# Patient Record
Sex: FEMALE | Race: ASIAN | NOT HISPANIC OR LATINO | URBAN - METROPOLITAN AREA
[De-identification: names, ages, dates, MRNs, and addresses within clinical notes are randomized per-mention and may not be internally consistent; named-entity substitution may affect disease eponyms.]

---

## 2024-09-05 ENCOUNTER — INPATIENT (INPATIENT)
Facility: HOSPITAL | Age: 59
LOS: 16 days | Discharge: HOME HEALTH | End: 2024-09-22
Attending: HOSPITALIST | Admitting: HOSPITALIST
Payer: MEDICAID

## 2024-09-05 VITALS
TEMPERATURE: 98 F | HEIGHT: 62 IN | SYSTOLIC BLOOD PRESSURE: 121 MMHG | HEART RATE: 98 BPM | OXYGEN SATURATION: 97 % | DIASTOLIC BLOOD PRESSURE: 73 MMHG | WEIGHT: 147.93 LBS | RESPIRATION RATE: 18 BRPM

## 2024-09-05 LAB
ALBUMIN SERPL ELPH-MCNC: 3 G/DL — LOW (ref 3.3–5)
ALP SERPL-CCNC: 103 U/L — SIGNIFICANT CHANGE UP (ref 40–120)
ALT FLD-CCNC: 26 U/L — SIGNIFICANT CHANGE UP (ref 4–33)
ANION GAP SERPL CALC-SCNC: 20 MMOL/L — HIGH (ref 7–14)
ANION GAP SERPL CALC-SCNC: 23 MMOL/L — HIGH (ref 7–14)
ANISOCYTOSIS BLD QL: SLIGHT — SIGNIFICANT CHANGE UP
APPEARANCE UR: ABNORMAL
APTT BLD: 23.2 SEC — LOW (ref 24.5–35.6)
AST SERPL-CCNC: 37 U/L — HIGH (ref 4–32)
BACTERIA # UR AUTO: ABNORMAL /HPF
BASOPHILS # BLD AUTO: 0.07 K/UL — SIGNIFICANT CHANGE UP (ref 0–0.2)
BASOPHILS NFR BLD AUTO: 0.8 % — SIGNIFICANT CHANGE UP (ref 0–2)
BILIRUB SERPL-MCNC: 0.7 MG/DL — SIGNIFICANT CHANGE UP (ref 0.2–1.2)
BILIRUB UR-MCNC: NEGATIVE — SIGNIFICANT CHANGE UP
BUN SERPL-MCNC: 140 MG/DL — HIGH (ref 7–23)
BUN SERPL-MCNC: 143 MG/DL — HIGH (ref 7–23)
CALCIUM SERPL-MCNC: 7.9 MG/DL — LOW (ref 8.4–10.5)
CALCIUM SERPL-MCNC: 8.8 MG/DL — SIGNIFICANT CHANGE UP (ref 8.4–10.5)
CAST: 2 /LPF — SIGNIFICANT CHANGE UP (ref 0–4)
CHLORIDE SERPL-SCNC: 99 MMOL/L — SIGNIFICANT CHANGE UP (ref 98–107)
CHLORIDE SERPL-SCNC: 99 MMOL/L — SIGNIFICANT CHANGE UP (ref 98–107)
CO2 SERPL-SCNC: 12 MMOL/L — LOW (ref 22–31)
CO2 SERPL-SCNC: 13 MMOL/L — LOW (ref 22–31)
COLOR SPEC: YELLOW — SIGNIFICANT CHANGE UP
CREAT SERPL-MCNC: 6.86 MG/DL — HIGH (ref 0.5–1.3)
CREAT SERPL-MCNC: 7.02 MG/DL — HIGH (ref 0.5–1.3)
DIFF PNL FLD: ABNORMAL
EGFR: 6 ML/MIN/1.73M2 — LOW
EGFR: 6 ML/MIN/1.73M2 — LOW
EOSINOPHIL # BLD AUTO: 0 K/UL — SIGNIFICANT CHANGE UP (ref 0–0.5)
EOSINOPHIL NFR BLD AUTO: 0 % — SIGNIFICANT CHANGE UP (ref 0–6)
FLUAV AG NPH QL: SIGNIFICANT CHANGE UP
FLUBV AG NPH QL: SIGNIFICANT CHANGE UP
GAS PNL BLDV: SIGNIFICANT CHANGE UP
GIANT PLATELETS BLD QL SMEAR: PRESENT — SIGNIFICANT CHANGE UP
GLUCOSE SERPL-MCNC: 148 MG/DL — HIGH (ref 70–99)
GLUCOSE SERPL-MCNC: 152 MG/DL — HIGH (ref 70–99)
GLUCOSE UR QL: NEGATIVE MG/DL — SIGNIFICANT CHANGE UP
HCT VFR BLD CALC: 31.4 % — LOW (ref 34.5–45)
HGB BLD-MCNC: 10.7 G/DL — LOW (ref 11.5–15.5)
HYPOCHROMIA BLD QL: SLIGHT — SIGNIFICANT CHANGE UP
IANC: 7.73 K/UL — HIGH (ref 1.8–7.4)
INR BLD: 1.2 RATIO — HIGH (ref 0.85–1.18)
KETONES UR-MCNC: NEGATIVE MG/DL — SIGNIFICANT CHANGE UP
LEUKOCYTE ESTERASE UR-ACNC: ABNORMAL
LYMPHOCYTES # BLD AUTO: 0.13 K/UL — LOW (ref 1–3.3)
LYMPHOCYTES # BLD AUTO: 1.5 % — LOW (ref 13–44)
MCHC RBC-ENTMCNC: 26.1 PG — LOW (ref 27–34)
MCHC RBC-ENTMCNC: 34.1 GM/DL — SIGNIFICANT CHANGE UP (ref 32–36)
MCV RBC AUTO: 76.6 FL — LOW (ref 80–100)
MICROCYTES BLD QL: SLIGHT — SIGNIFICANT CHANGE UP
MONOCYTES # BLD AUTO: 0.19 K/UL — SIGNIFICANT CHANGE UP (ref 0–0.9)
MONOCYTES NFR BLD AUTO: 2.3 % — SIGNIFICANT CHANGE UP (ref 2–14)
NEUTROPHILS # BLD AUTO: 7.98 K/UL — HIGH (ref 1.8–7.4)
NEUTROPHILS NFR BLD AUTO: 95.4 % — HIGH (ref 43–77)
NITRITE UR-MCNC: POSITIVE
OVALOCYTES BLD QL SMEAR: SLIGHT — SIGNIFICANT CHANGE UP
PH UR: 5.5 — SIGNIFICANT CHANGE UP (ref 5–8)
PLAT MORPH BLD: NORMAL — SIGNIFICANT CHANGE UP
PLATELET # BLD AUTO: 139 K/UL — LOW (ref 150–400)
PLATELET COUNT - ESTIMATE: ABNORMAL
POIKILOCYTOSIS BLD QL AUTO: SIGNIFICANT CHANGE UP
POLYCHROMASIA BLD QL SMEAR: SLIGHT — SIGNIFICANT CHANGE UP
POTASSIUM SERPL-MCNC: 5.9 MMOL/L — HIGH (ref 3.5–5.3)
POTASSIUM SERPL-MCNC: 6.9 MMOL/L — CRITICAL HIGH (ref 3.5–5.3)
POTASSIUM SERPL-SCNC: 5.9 MMOL/L — HIGH (ref 3.5–5.3)
POTASSIUM SERPL-SCNC: 6.9 MMOL/L — CRITICAL HIGH (ref 3.5–5.3)
PROT SERPL-MCNC: 7.1 G/DL — SIGNIFICANT CHANGE UP (ref 6–8.3)
PROT UR-MCNC: 30 MG/DL
PROTHROM AB SERPL-ACNC: 13.4 SEC — HIGH (ref 9.5–13)
RBC # BLD: 4.1 M/UL — SIGNIFICANT CHANGE UP (ref 3.8–5.2)
RBC # FLD: 15.9 % — HIGH (ref 10.3–14.5)
RBC BLD AUTO: ABNORMAL
RBC CASTS # UR COMP ASSIST: 1 /HPF — SIGNIFICANT CHANGE UP (ref 0–4)
REVIEW: SIGNIFICANT CHANGE UP
RSV RNA NPH QL NAA+NON-PROBE: SIGNIFICANT CHANGE UP
SARS-COV-2 RNA SPEC QL NAA+PROBE: SIGNIFICANT CHANGE UP
SODIUM SERPL-SCNC: 132 MMOL/L — LOW (ref 135–145)
SODIUM SERPL-SCNC: 134 MMOL/L — LOW (ref 135–145)
SP GR SPEC: 1.01 — SIGNIFICANT CHANGE UP (ref 1–1.03)
SQUAMOUS # UR AUTO: 0 /HPF — SIGNIFICANT CHANGE UP (ref 0–5)
TROPONIN T, HIGH SENSITIVITY RESULT: 13 NG/L — SIGNIFICANT CHANGE UP
UROBILINOGEN FLD QL: 0.2 MG/DL — SIGNIFICANT CHANGE UP (ref 0.2–1)
WBC # BLD: 8.36 K/UL — SIGNIFICANT CHANGE UP (ref 3.8–10.5)
WBC # FLD AUTO: 8.36 K/UL — SIGNIFICANT CHANGE UP (ref 3.8–10.5)
WBC UR QL: 141 /HPF — HIGH (ref 0–5)

## 2024-09-05 PROCEDURE — 99291 CRITICAL CARE FIRST HOUR: CPT

## 2024-09-05 PROCEDURE — 71046 X-RAY EXAM CHEST 2 VIEWS: CPT | Mod: 26

## 2024-09-05 PROCEDURE — 74177 CT ABD & PELVIS W/CONTRAST: CPT | Mod: 26,MC

## 2024-09-05 RX ORDER — SODIUM ZIRCONIUM CYCLOSILICATE 10 G/10G
10 POWDER, FOR SUSPENSION ORAL ONCE
Refills: 0 | Status: COMPLETED | OUTPATIENT
Start: 2024-09-05 | End: 2024-09-05

## 2024-09-05 RX ORDER — SODIUM CHLORIDE IRRIG SOLUTION 0.9 %
1000 SOLUTION, IRRIGATION IRRIGATION ONCE
Refills: 0 | Status: COMPLETED | OUTPATIENT
Start: 2024-09-05 | End: 2024-09-05

## 2024-09-05 RX ORDER — INSULIN REGULAR, HUMAN 100/ML
5 VIAL (ML) INJECTION ONCE
Refills: 0 | Status: COMPLETED | OUTPATIENT
Start: 2024-09-05 | End: 2024-09-05

## 2024-09-05 RX ORDER — ACETAMINOPHEN 325 MG
1000 TABLET ORAL ONCE
Refills: 0 | Status: COMPLETED | OUTPATIENT
Start: 2024-09-05 | End: 2024-09-05

## 2024-09-05 RX ORDER — ALCOHOL ANTISEPTIC PADS
50 PADS, MEDICATED (EA) TOPICAL ONCE
Refills: 0 | Status: COMPLETED | OUTPATIENT
Start: 2024-09-05 | End: 2024-09-05

## 2024-09-05 RX ORDER — PIPERACILLIN SODIUM AND TAZOBACTAM SODIUM 12; 1.5 G/60ML; G/60ML
3.38 INJECTION, POWDER, LYOPHILIZED, FOR SOLUTION INTRAVENOUS ONCE
Refills: 0 | Status: COMPLETED | OUTPATIENT
Start: 2024-09-05 | End: 2024-09-05

## 2024-09-05 RX ADMIN — Medication 1000 MILLILITER(S): at 20:54

## 2024-09-05 RX ADMIN — Medication 1000 MILLIGRAM(S): at 21:54

## 2024-09-05 RX ADMIN — Medication 1000 MILLILITER(S): at 23:19

## 2024-09-05 RX ADMIN — PIPERACILLIN SODIUM AND TAZOBACTAM SODIUM 200 GRAM(S): 12; 1.5 INJECTION, POWDER, LYOPHILIZED, FOR SOLUTION INTRAVENOUS at 20:55

## 2024-09-05 RX ADMIN — Medication 400 MILLIGRAM(S): at 20:54

## 2024-09-05 NOTE — ED ADULT NURSE NOTE - NSFALLRISKINTERV_ED_ALL_ED
Assistance with ambulation/Communicate fall risk and risk factors to all staff, patient, and family/Provide visual cue: yellow wristband, yellow gown, etc/Reinforce activity limits and safety measures with patient and family/Call bell, personal items and telephone in reach/Instruct patient to call for assistance before getting out of bed/chair/stretcher/Non-slip footwear applied when patient is off stretcher/[place] to call system/Physically safe environment - no spills, clutter or unnecessary equipment/Purposeful Proactive Rounding/Room/bathroom lighting operational, light cord in reach

## 2024-09-05 NOTE — ED ADULT TRIAGE NOTE - CHIEF COMPLAINT QUOTE
c/o fever. weakness chills, onset 1 week ago reports was treated for UTI but   reports worsening weakness last 2 days and unable to perform ADLs at home, phx of arthritis on methotrexate.

## 2024-09-05 NOTE — ED ADULT NURSE NOTE - OBJECTIVE STATEMENT
Patient received at handoff as a new patient that was not seen yet. RN informed by Attending physician that patient is a septic workup because she looks very ill. Float nurse at bedside assisting. IV place 20G right AC, blood drawn and sent to lab, patient on cardiac monitor, EKG done. Patient noted to be breathing fast with use of abdominal muscles RR 37bpm. Rectal temp done 100.9. Tylenol and IVF started as ordered. V/s done and recorded.

## 2024-09-05 NOTE — ED PROVIDER NOTE - PROGRESS NOTE DETAILS
Patient's son: Ale  [phone] Srinath Bojorquez, ED Attending: Patient signed out to me, increased work of breathing, tachycardia to the 130s, bedside POCUS A-line predominant, concern for possible worsening acidosis.  Repeat VBG ordered, pending.  In the interim we will treat for hyperkalemia, given insulin, D50, will start bicarbonate drip.  MICU consulted, not a MICU candidate at this time.  Nephrology eval pending.  Will place patient on BiPAP for increased work of breathing, reassess to dispo. bicarb drip no lr  per neph    giec calcium prevetnion 1g IV  lasix 80mg IV x1  more insulin and dextrose  after this rp labs (bmp, vbg) 1-1.5h after. (Veronica Morillo MD-PGY1): Nephrology is following. Will admit to ICU ([facility]). Labs drawn prior to dextrose/insulin and bicarb drip, so will just order calcium as it is decreasing on labs, and she will have repeat labs (if here for much longer, can draw here, otherwise per ICU). Below are nephrology verbal recommendations for this patient and current treatments--she is borderline for requiring emergent dialysis.   Per nephrology   - IV calcium 1g for cardioprotection (will give)  - lasix 80mg IV (holding off per ER)  - more insulin/dextrose to shift (got this x1)  - bicarb drip, no LR (continuing)  - repeat bmp/vbg 1.5 hours after interventions (if here can repeat, otherwise per ICU). Patient's son: Ale  [phone]  Patient's other son who is RN: [phone] Patient's son who is here: Ale ([phone])  Patient's other son who is RN: [phone]    Results notable for cr of 7 with k of 6.9 that was slightly hemolyzed. Plan to insert garcia, consult neph for possible emergent dialysis, and ICU. AG elevated with normal lactate, so metabolic acidosis with AG could be due to renal failure at this point. EKG without acute changes concerning for hyperkalemia, will monitor and repeat labs. (Veronica Morillo MD-PGY1): Patient remains stable. Nephrology asking about repeat labs/interventions. Ordered repeat BMP and VBG at 0215, so will have this done. If still here when results return, may need additional interventions until she goes to ICU.

## 2024-09-05 NOTE — ED PROVIDER NOTE - OBJECTIVE STATEMENT
Attending/Ant: 57 yo F Sinhala speaking with unclear history, HTN, hyperchol recently treated for UTI with Cipro about10 days ago, reportedly not feeling well for the past three days, including fever/chills, dysuria, n/v and loose stools. History obtained from son by resident. Son is no longer available in the ED and patient not [id number] information.  LL # [id number] Rosalind Logan Attending/Ant: 59 yo F Setswana speaking with unclear history, HTN, hyperchol recently treated for UTI with Cipro about10 days ago, reportedly not feeling well for the past three days, including fever/chills, dysuria, n/v and loose stools. History obtained from son by resident. Son is no longer available in the ED and patient not [id number] information.   # [id number] Rosalind Logan    Ms. Aggie Call is a 57yo woman with a history of rheumatoid arthritis on MTX, HTN, HLD, and GERD who presents to the ER for evaluation of weakness. Patient is Setswana speaking, unable to provide meaningful contribution to history given altered mental status from likely infection.  Her son is interpreting.  Also spoke with her other son, who is an RN, to provide some collateral.    Per family, patient developed symptoms concerning for UTI about 10 days ago.  She went to her PCP and was given ciprofloxacin.  Denies that this was helpful and patient developed a fever with nausea and vomiting a few days ago.  Also reports throat pain for 2 to 3 days that is worse with eating/drinking.  Due to this, she has not had much oral intake, including no medications for 3 days.  Not currently taking her antibiotic due to this.  Son brought her to ER given worsening symptoms.  He also reports she accidentally took MiraLAX because she thought it was for GERD and has had diarrhea, but this is now resolved.  Patient was not able to answer if she has chest pain, shortness of breath, but did indicate some generalized abdominal pain that is not localized.  Her son worries he did not bring her soon enough.    Per RN son, he does not live with the patient and does not know fully her medical history, but denies any knowledge of prior kidney issues, heart issues, stroke, anything major.  He reports that she was her usual state of health, talking, taking care of herself 3 weeks ago.  Also reports that his brother and the patient do not have a good understanding of her medical conditions and medications.  He further does not know why she had prescribed hydroxychloroquine.  No other history to contribute. Of note, patient brought all her medications and there are prescriptions for losartan, PPI, statin, MTX, acetaminophen and plaquenil.

## 2024-09-05 NOTE — ED PROVIDER NOTE - CLINICAL SUMMARY MEDICAL DECISION MAKING FREE TEXT BOX
A/P 58-year-old female history of hypertension and unclear past medical history including surgical history, who reportedly was treated for UTI 10 days ago on Cipro now for the past 3 days reporting fever/chills, dysuria, and fatigue.  Exam noted for temperature of 101.1,+tachypnia,  lower abd PT, dry mucosa. Plan: Screening EKG, sepsis protocol, CT-abd/pelvis, cardiac monitor  No relevant EMR to reviewed A/P 58-year-old female history of hypertension and unclear past medical history including surgical history, who reportedly was treated for UTI 10 days ago on Cipro now for the past 3 days reporting fever/chills, dysuria, and fatigue.  Exam noted for temperature of 101.1,+tachypnia,  lower abd PT, dry mucosa. Plan: Screening EKG, sepsis protocol, CT-abd/pelvis, cardiac monitor  No relevant EMR to review    This is a 59yo woman with a history of rheumatoid arthritis on MTX, HTN, HLD, and GERD who presents to the ER for evaluation of weakness. Patient is Portuguese speaking. Has been having urinary symptoms for 10 days that is worsening. Was given cipro and stopped taking 3 days ago when she developed throat pain. Now with new fever, prompting ER eval per son. Vitals on arrival notable for BP-104/68, HR-97, T-98.5F (oral), T-101.1F (rectal), SpO2-99%. Physical notable for ill-appearing patient with tachypnea, dry mucous membranes, diffusely tender abdomen, mildly tachycardic, lungs clear.  This is most likely urosepsis, possibly pyelonephritis, so will plan for sepsis protocol (LR, empiric abx, labs), CT abdomen pelvis, and EKG/trop/cardiac monitoring. Dispo likely to admit.

## 2024-09-05 NOTE — ED PROVIDER NOTE - PHYSICAL EXAMINATION
Attending/Ant: Mild tachypneic; PERRL/EOMI, non-icterus, supple, no CHEPE, no JVD, RRR, CTAB; Abd-soft, +lower abd PT, no guarding or rebound, no HSM; no LE edema, Awake, alert, nonfocal; Skin-hot/dry  Rectal Temp: 101.1 Attending/Ant: Mild tachypneic; PERRL/EOMI, non-icterus, supple, no CHEPE, no JVD, RRR, CTAB; Abd-soft, +lower abd PT, no guarding or rebound, no HSM; no LE edema, Awake, alert, nonfocal; Skin-hot/dry  Rectal Temp: 101.1    PHYSICAL EXAM  General: Toxic, uncomfortable, mildly altered, son at bedside  HEENT: Normocephalic. Atraumatic. Normal icterus. MM dry.   Heart: Tachycardic rate, regular rhythm. Normal S1/S2. No murmurs.   Lungs: Clear to ausculation bilaterally, no wheezes, rhonchi. Normal effort.   GI: Soft, non-distended, diffuse tenderness, worse on LLQ. No guarding or rebound.   Neuro: Alert. Waxing and waning mental status. No focal deficits.   Ext: Peripheral pulses intact. No lower extremity edema.  Skin: Hot, dry.

## 2024-09-05 NOTE — CONSULT NOTE ADULT - ASSESSMENT
58-year-old female history of hypertension and unclear past medical history including surgical history, who reportedly was treated for UTI 10 days ago on Cipro now for the past 3 days reporting fever/chills, dysuria, and fatigue. MICU consulted for urgent HD.     #UTI  - Suspect possible recurrence of UTI failing PO abx outpatient, agree w/ Zosyn, blood and urine cultures drawn in ED. CTAP w/ Cystitis with ascending ureteritis and pyelonephritis.    #Renal Failure  #Acidosis  #HyperK  #Hyponatremia  - New onset renal failure of unknown duration w/ no prior labs on sunrise or HIE. Complicated by metabolic acidosis with respiratory compensation, hyperK, and hyponatremia in s/o likely volume overload. Patient with increased WOB as a result due to increased respiratory drive and likely volume overload.   - Nephrology evaluation pending for urgent HD, pt at this time can be temporized.     Recommendations:  - Agree with blood, urine cultures drawn in setting of pyelo, continue broad spectrum abx   - Initiate BIPAP for increased WOB, serial VBGs to monitor acidosis and compensation.  - Duonebs x1 for wheezing on exam  - Would follow-up with nephrology regarding HD plan - otherwise would recommend 1 amp of Sodium Bicarb and start Bicarb drip, agree with insulin/dextrose and lokelma for hyperkalemia. Would repeat lytes in 1-2 hours to monitor change.     Pt not a MICU candidate at this time, pending Nephrology HD plan. Patient seen and discussed with MICU attending, recommendations not final until attending attestation. Please reconsult as needed.  58-year-old female history of hypertension and unclear past medical history including surgical history, who reportedly was treated for UTI 10 days ago on Cipro now for the past 3 days reporting fever/chills, dysuria, and fatigue. MICU consulted for urgent HD.     #UTI  - Suspect possible recurrence of UTI failing PO abx outpatient, agree w/ Zosyn, blood and urine cultures drawn in ED. CTAP w/ Cystitis with ascending ureteritis and pyelonephritis.    #Renal Failure  #Acidosis  #HyperK  #Hyponatremia  - New onset renal failure of unknown duration w/ no prior labs on sunrise or HIE. Complicated by metabolic acidosis with respiratory compensation, hyperK, and hyponatremia in s/o likely volume overload. Patient with increased WOB as a result due to increased respiratory drive and likely volume overload.   - Nephrology evaluation pending for urgent HD, pt at this time can be temporized.     Recommendations:  - Agree with blood, urine cultures drawn in setting of pyelo, continue broad spectrum abx   - Initiate BIPAP for increased WOB  - Duonebs x1 for wheezing on exam  - Would follow-up with nephrology regarding HD plan - otherwise would recommend 1 amp of Sodium Bicarb and start Bicarb drip, agree with insulin/dextrose and lokelma for hyperkalemia. Would repeat lytes and VBG in 1-2 hours to monitor change.     Pt not a MICU candidate at this time, pending Nephrology HD plan. Patient seen and discussed with MICU attending, recommendations not final until attending attestation. Please reconsult as needed.  58-year-old female history of hypertension and unclear past medical history including surgical history, who reportedly was treated for UTI 10 days ago on Cipro now for the past 3 days reporting fever/chills, dysuria, and fatigue. MICU consulted for urgent HD in s/o new onset renal failure.     #UTI  - Suspect possible recurrence of UTI failing PO abx outpatient, agree w/ Zosyn, blood and urine cultures drawn in ED. CTAP w/ Cystitis with ascending ureteritis and pyelonephritis.    #Renal Failure  #Acidosis  #HyperK  #Hyponatremia  - New onset renal failure of unknown duration w/ no prior labs on sunrise or HIE. Complicated by metabolic acidosis with respiratory compensation, hyperK, and hyponatremia in s/o likely volume overload. Patient with increased WOB as a result due to increased respiratory drive and likely volume overload.   - Nephrology evaluation pending for urgent HD, pt at this time can be temporized.     Recommendations:  - Agree with blood, urine cultures drawn in setting of pyelo, continue broad spectrum abx   - Initiate BIPAP for increased WOB  - Duonebs x1 for wheezing on exam  - Would follow-up with nephrology regarding HD plan - otherwise would recommend 1 amp of Sodium Bicarb and start Bicarb drip, agree with insulin/dextrose and lokelma for hyperkalemia. Would repeat lytes and VBG in 1-2 hours to monitor change.     Pt not a MICU candidate at this time, pending Nephrology HD plan. Patient seen and discussed with MICU attending, recommendations not final until attending attestation. Please reconsult as needed.  58-year-old female history of hypertension and unclear past medical history including surgical history, who reportedly was treated for UTI 10 days ago on Cipro now for the past 3 days reporting fever/chills, dysuria, and fatigue, found to have pyelo. MICU consulted for urgent HD in s/o new onset renal failure.     #UTI  - Suspect possible recurrence of UTI failing PO abx outpatient, agree w/ Zosyn, blood and urine cultures drawn in ED. CTAP w/ Cystitis with ascending ureteritis and pyelonephritis.    #Renal Failure  #Acidosis  #HyperK  #Hyponatremia  - New onset renal failure of unknown duration w/ no prior labs on sunrise or HIE. Complicated by metabolic acidosis with respiratory compensation, hyperK, and hyponatremia in s/o likely volume overload. Patient with increased WOB as a result due to increased respiratory drive and likely volume overload.   - Nephrology evaluation pending for urgent HD, pt at this time can be temporized.     Recommendations:  - Agree with blood, urine cultures drawn in setting of pyelo, continue broad spectrum abx   - Initiate BIPAP for increased WOB  - Duonebs x1 for wheezing on exam  - Would follow-up with nephrology regarding HD plan - otherwise would recommend 1 amp of Sodium Bicarb and start Bicarb drip, agree with insulin/dextrose and lokelma for hyperkalemia. Would repeat lytes and VBG in 1-2 hours to monitor change.     Pt not a MICU candidate at this time, pending Nephrology HD plan. Patient seen and discussed with MICU attending, recommendations not final until attending attestation. Please reconsult as needed.

## 2024-09-05 NOTE — CONSULT NOTE ADULT - SUBJECTIVE AND OBJECTIVE BOX
CHIEF COMPLAINT: Fevers    HPI: 58-year-old female history of hypertension and unclear past medical history including surgical history, who reportedly was treated for UTI 10 days ago on Cipro now for the past 3 days reporting fever/chills, dysuria, and fatigue. MICU consulted for urgent HD.     Family not at bedside during provider interview, patient does not appear to be in pain however w/ significantly increased WOB and wheezing. Pt otherwise alert but nonverbal. ED course notable for hyperK to 6's then downtrended to 5's without intervention pt ordered for insulin/dextrose and lokelma. Pt acidotic with respiratory compensation on VBG. Cultures drawn, given Zosyn x1, CTAP performed, Cystitis with ascending ureteritis and pyelonephritis on prelim read. ECG at 10 PM  demonstrating narrow QRS, rate 80's, no peaked T's.     PAST MEDICAL & SURGICAL HISTORY:  HTN (hypertension)    FAMILY HISTORY:    SOCIAL HISTORY:    Allergies  No Known Allergies  Intolerances    HOME MEDICATIONS:    REVIEW OF SYSTEMS:  Unable to obtain ROS as patient nonverbal.     OBJECTIVE:  ICU Vital Signs Last 24 Hrs  T(C): 38.3 (05 Sep 2024 20:34), Max: 38.3 (05 Sep 2024 20:34)  T(F): 100.9 (05 Sep 2024 20:34), Max: 100.9 (05 Sep 2024 20:34)  HR: 96 (05 Sep 2024 20:34) (96 - 98)  BP: 148/67 (05 Sep 2024 20:34) (104/68 - 148/67)  BP(mean): --  ABP: --  ABP(mean): --  RR: 37 (05 Sep 2024 20:34) (18 - 37)  SpO2: 96% (05 Sep 2024 20:34) (96% - 99%)    O2 Parameters below as of 05 Sep 2024 20:34  Patient On (Oxygen Delivery Method): room air    CAPILLARY BLOOD GLUCOSE  POCT Blood Glucose.: 135 mg/dL (05 Sep 2024 23:27)    VITALS:   T(C): 38.3 (24 @ 20:34), Max: 38.3 (24 @ 20:34)  HR: 96 (24 @ 20:34) (96 - 98)  BP: 148/67 (24 @ 20:34) (104/68 - 148/67)  RR: 37 (24 @ 20:34) (18 - 37)  SpO2: 96% (24 @ 20:34) (96% - 99%)    GENERAL: NAD, lying in bed with increased WOB.   HEAD:  Atraumatic, normocephalic  EYES: EOMI, PERRLA, conjunctiva and sclera clear  ENT: Moist mucous membranes  NECK: Supple, no JVD  HEART: Regular rate and rhythm, no murmurs, rubs, or gallops  LUNGS: Increased WOB, wheezing bilaterally  ABDOMEN: Soft, nontender, nondistended, +BS  EXTREMITIES: 2+ peripheral pulses bilaterally. Mild 1+ edema to shins bilaterally.   NERVOUS SYSTEM:  A&Ox3, no focal deficits   SKIN: No rashes or lesions    HOSPITAL MEDICATIONS:  MEDICATIONS  (STANDING):  dextrose 50% Injectable 50 milliLiter(s) IV Push once  insulin regular  human recombinant 5 Unit(s) IV Push once  sodium zirconium cyclosilicate 10 Gram(s) Oral Once    MEDICATIONS  (PRN):    LABS:                        10.7   8.36  )-----------( 139      ( 05 Sep 2024 20:45 )             31.4         132<L>  |  99  |  140<H>  ----------------------------<  152<H>  5.9<H>   |  13<L>  |  6.86<H>    Ca    7.9<L>      05 Sep 2024 22:30    TPro  7.1  /  Alb  3.0<L>  /  TBili  0.7  /  DBili  x   /  AST  37<H>  /  ALT  26  /  AlkPhos  103  -    PT/INR - ( 05 Sep 2024 20:45 )   PT: 13.4 sec;   INR: 1.20 ratio         PTT - ( 05 Sep 2024 20:45 )  PTT:23.2 sec  Urinalysis Basic - ( 05 Sep 2024 22:30 )    Color: Yellow / Appearance: Cloudy / S.014 / pH: x  Gluc: 152 mg/dL / Ketone: Negative mg/dL  / Bili: Negative / Urobili: 0.2 mg/dL   Blood: x / Protein: 30 mg/dL / Nitrite: Positive   Leuk Esterase: Large / RBC: 1 /HPF /  /HPF   Sq Epi: x / Non Sq Epi: 0 /HPF / Bacteria: Many /HPF    Venous Blood Gas:   @ 20:45  7.23/30/35/13/46.8  VBG Lactate: 1.4

## 2024-09-06 DIAGNOSIS — N12 TUBULO-INTERSTITIAL NEPHRITIS, NOT SPECIFIED AS ACUTE OR CHRONIC: ICD-10-CM

## 2024-09-06 LAB
-  CTX-M RESISTANCE MARKER: SIGNIFICANT CHANGE UP
-  ESBL: SIGNIFICANT CHANGE UP
ACANTHOCYTES BLD QL SMEAR: SLIGHT — SIGNIFICANT CHANGE UP
ALBUMIN SERPL ELPH-MCNC: 2.4 G/DL — LOW (ref 3.3–5)
ALP SERPL-CCNC: 85 U/L — SIGNIFICANT CHANGE UP (ref 40–120)
ALT FLD-CCNC: 17 U/L — SIGNIFICANT CHANGE UP (ref 4–33)
ANION GAP SERPL CALC-SCNC: 17 MMOL/L — HIGH (ref 7–14)
ANION GAP SERPL CALC-SCNC: 21 MMOL/L — HIGH (ref 7–14)
ANION GAP SERPL CALC-SCNC: 24 MMOL/L — HIGH (ref 7–14)
ANISOCYTOSIS BLD QL: SLIGHT — SIGNIFICANT CHANGE UP
AST SERPL-CCNC: 21 U/L — SIGNIFICANT CHANGE UP (ref 4–32)
BASOPHILS # BLD AUTO: 0 K/UL — SIGNIFICANT CHANGE UP (ref 0–0.2)
BASOPHILS NFR BLD AUTO: 0 % — SIGNIFICANT CHANGE UP (ref 0–2)
BILIRUB SERPL-MCNC: 0.6 MG/DL — SIGNIFICANT CHANGE UP (ref 0.2–1.2)
BLOOD GAS ARTERIAL COMPREHENSIVE RESULT: SIGNIFICANT CHANGE UP
BLOOD GAS VENOUS COMPREHENSIVE RESULT: SIGNIFICANT CHANGE UP
BLOOD GAS VENOUS COMPREHENSIVE RESULT: SIGNIFICANT CHANGE UP
BUN SERPL-MCNC: 122 MG/DL — HIGH (ref 7–23)
BUN SERPL-MCNC: 138 MG/DL — HIGH (ref 7–23)
BUN SERPL-MCNC: 149 MG/DL — HIGH (ref 7–23)
CALCIUM SERPL-MCNC: 10 MG/DL — SIGNIFICANT CHANGE UP (ref 8.4–10.5)
CALCIUM SERPL-MCNC: 7.4 MG/DL — LOW (ref 8.4–10.5)
CALCIUM SERPL-MCNC: 8.1 MG/DL — LOW (ref 8.4–10.5)
CHLORIDE SERPL-SCNC: 95 MMOL/L — LOW (ref 98–107)
CHLORIDE SERPL-SCNC: 98 MMOL/L — SIGNIFICANT CHANGE UP (ref 98–107)
CHLORIDE SERPL-SCNC: 98 MMOL/L — SIGNIFICANT CHANGE UP (ref 98–107)
CO2 SERPL-SCNC: 11 MMOL/L — LOW (ref 22–31)
CO2 SERPL-SCNC: 14 MMOL/L — LOW (ref 22–31)
CO2 SERPL-SCNC: 27 MMOL/L — SIGNIFICANT CHANGE UP (ref 22–31)
CREAT SERPL-MCNC: 5.9 MG/DL — HIGH (ref 0.5–1.3)
CREAT SERPL-MCNC: 6.42 MG/DL — HIGH (ref 0.5–1.3)
CREAT SERPL-MCNC: 6.52 MG/DL — HIGH (ref 0.5–1.3)
E COLI DNA BLD POS QL NAA+NON-PROBE: SIGNIFICANT CHANGE UP
EGFR: 7 ML/MIN/1.73M2 — LOW
EGFR: 7 ML/MIN/1.73M2 — LOW
EGFR: 8 ML/MIN/1.73M2 — LOW
EOSINOPHIL # BLD AUTO: 0.09 K/UL — SIGNIFICANT CHANGE UP (ref 0–0.5)
EOSINOPHIL NFR BLD AUTO: 0.9 % — SIGNIFICANT CHANGE UP (ref 0–6)
GAS PNL BLDV: SIGNIFICANT CHANGE UP
GIANT PLATELETS BLD QL SMEAR: PRESENT — SIGNIFICANT CHANGE UP
GLUCOSE SERPL-MCNC: 129 MG/DL — HIGH (ref 70–99)
GLUCOSE SERPL-MCNC: 164 MG/DL — HIGH (ref 70–99)
GLUCOSE SERPL-MCNC: 221 MG/DL — HIGH (ref 70–99)
GRAM STN FLD: ABNORMAL
HCT VFR BLD CALC: 22.6 % — LOW (ref 34.5–45)
HCT VFR BLD CALC: 23.1 % — LOW (ref 34.5–45)
HGB BLD-MCNC: 7.9 G/DL — LOW (ref 11.5–15.5)
HGB BLD-MCNC: 8.3 G/DL — LOW (ref 11.5–15.5)
IANC: 9.41 K/UL — HIGH (ref 1.8–7.4)
LYMPHOCYTES # BLD AUTO: 0.54 K/UL — LOW (ref 1–3.3)
LYMPHOCYTES # BLD AUTO: 5.3 % — LOW (ref 13–44)
MAGNESIUM SERPL-MCNC: 2.7 MG/DL — HIGH (ref 1.6–2.6)
MAGNESIUM SERPL-MCNC: 3 MG/DL — HIGH (ref 1.6–2.6)
MCHC RBC-ENTMCNC: 26 PG — LOW (ref 27–34)
MCHC RBC-ENTMCNC: 26.3 PG — LOW (ref 27–34)
MCHC RBC-ENTMCNC: 35 GM/DL — SIGNIFICANT CHANGE UP (ref 32–36)
MCHC RBC-ENTMCNC: 35.9 GM/DL — SIGNIFICANT CHANGE UP (ref 32–36)
MCV RBC AUTO: 72.4 FL — LOW (ref 80–100)
MCV RBC AUTO: 75.3 FL — LOW (ref 80–100)
METHOD TYPE: SIGNIFICANT CHANGE UP
MICROCYTES BLD QL: SLIGHT — SIGNIFICANT CHANGE UP
MONOCYTES # BLD AUTO: 0.37 K/UL — SIGNIFICANT CHANGE UP (ref 0–0.9)
MONOCYTES NFR BLD AUTO: 3.6 % — SIGNIFICANT CHANGE UP (ref 2–14)
NEUTROPHILS # BLD AUTO: 9.17 K/UL — HIGH (ref 1.8–7.4)
NEUTROPHILS NFR BLD AUTO: 89.3 % — HIGH (ref 43–77)
NEUTS BAND # BLD: 0.9 % — SIGNIFICANT CHANGE UP (ref 0–6)
NRBC # BLD: 0 /100 WBCS — SIGNIFICANT CHANGE UP (ref 0–0)
NRBC # FLD: 0 K/UL — SIGNIFICANT CHANGE UP (ref 0–0)
OVALOCYTES BLD QL SMEAR: SLIGHT — SIGNIFICANT CHANGE UP
PHOSPHATE SERPL-MCNC: 7.1 MG/DL — HIGH (ref 2.5–4.5)
PHOSPHATE SERPL-MCNC: 8.3 MG/DL — HIGH (ref 2.5–4.5)
PLAT MORPH BLD: NORMAL — SIGNIFICANT CHANGE UP
PLATELET # BLD AUTO: 77 K/UL — LOW (ref 150–400)
PLATELET # BLD AUTO: 77 K/UL — LOW (ref 150–400)
PLATELET COUNT - ESTIMATE: ABNORMAL
POIKILOCYTOSIS BLD QL AUTO: SLIGHT — SIGNIFICANT CHANGE UP
POTASSIUM SERPL-MCNC: 4.4 MMOL/L — SIGNIFICANT CHANGE UP (ref 3.5–5.3)
POTASSIUM SERPL-MCNC: 5.1 MMOL/L — SIGNIFICANT CHANGE UP (ref 3.5–5.3)
POTASSIUM SERPL-MCNC: 5.3 MMOL/L — SIGNIFICANT CHANGE UP (ref 3.5–5.3)
POTASSIUM SERPL-SCNC: 4.4 MMOL/L — SIGNIFICANT CHANGE UP (ref 3.5–5.3)
POTASSIUM SERPL-SCNC: 5.1 MMOL/L — SIGNIFICANT CHANGE UP (ref 3.5–5.3)
POTASSIUM SERPL-SCNC: 5.3 MMOL/L — SIGNIFICANT CHANGE UP (ref 3.5–5.3)
PROT SERPL-MCNC: 5.1 G/DL — LOW (ref 6–8.3)
RBC # BLD: 3 M/UL — LOW (ref 3.8–5.2)
RBC # BLD: 3.19 M/UL — LOW (ref 3.8–5.2)
RBC # FLD: 14.9 % — HIGH (ref 10.3–14.5)
RBC # FLD: 15.3 % — HIGH (ref 10.3–14.5)
RBC BLD AUTO: ABNORMAL
SMUDGE CELLS # BLD: PRESENT — SIGNIFICANT CHANGE UP
SODIUM SERPL-SCNC: 133 MMOL/L — LOW (ref 135–145)
SODIUM SERPL-SCNC: 133 MMOL/L — LOW (ref 135–145)
SODIUM SERPL-SCNC: 139 MMOL/L — SIGNIFICANT CHANGE UP (ref 135–145)
SPECIMEN SOURCE: SIGNIFICANT CHANGE UP
SPECIMEN SOURCE: SIGNIFICANT CHANGE UP
WBC # BLD: 10.17 K/UL — SIGNIFICANT CHANGE UP (ref 3.8–10.5)
WBC # BLD: 10.55 K/UL — HIGH (ref 3.8–10.5)
WBC # FLD AUTO: 10.17 K/UL — SIGNIFICANT CHANGE UP (ref 3.8–10.5)
WBC # FLD AUTO: 10.55 K/UL — HIGH (ref 3.8–10.5)

## 2024-09-06 PROCEDURE — 99222 1ST HOSP IP/OBS MODERATE 55: CPT

## 2024-09-06 PROCEDURE — 99291 CRITICAL CARE FIRST HOUR: CPT | Mod: GC,25

## 2024-09-06 PROCEDURE — 76937 US GUIDE VASCULAR ACCESS: CPT | Mod: 26

## 2024-09-06 PROCEDURE — 36600 WITHDRAWAL OF ARTERIAL BLOOD: CPT

## 2024-09-06 RX ORDER — DEXLANSOPRAZOLE 60 MG
1 CAPSULE, DELAYED RELEASE, BIPHASIC ORAL
Refills: 0 | DISCHARGE

## 2024-09-06 RX ORDER — PIPERACILLIN SODIUM AND TAZOBACTAM SODIUM 12; 1.5 G/60ML; G/60ML
3.38 INJECTION, POWDER, LYOPHILIZED, FOR SOLUTION INTRAVENOUS ONCE
Refills: 0 | Status: DISCONTINUED | OUTPATIENT
Start: 2024-09-06 | End: 2024-09-06

## 2024-09-06 RX ORDER — PIPERACILLIN SODIUM AND TAZOBACTAM SODIUM 12; 1.5 G/60ML; G/60ML
3.38 INJECTION, POWDER, LYOPHILIZED, FOR SOLUTION INTRAVENOUS ONCE
Refills: 0 | Status: COMPLETED | OUTPATIENT
Start: 2024-09-06 | End: 2024-09-06

## 2024-09-06 RX ORDER — ACETAMINOPHEN 325 MG
1000 TABLET ORAL ONCE
Refills: 0 | Status: COMPLETED | OUTPATIENT
Start: 2024-09-06 | End: 2024-09-06

## 2024-09-06 RX ORDER — ERTAPENEM 1 G/1
500 INJECTION, POWDER, LYOPHILIZED, FOR SOLUTION INTRAMUSCULAR; INTRAVENOUS EVERY 24 HOURS
Refills: 0 | Status: COMPLETED | OUTPATIENT
Start: 2024-09-06 | End: 2024-09-15

## 2024-09-06 RX ORDER — HYDROXYCHLOROQUINE SULFATE 200 MG/1
1 TABLET, FILM COATED ORAL
Refills: 0 | DISCHARGE

## 2024-09-06 RX ORDER — CHLORHEXIDINE GLUCONATE ORAL RINSE 1.2 MG/ML
1 SOLUTION DENTAL
Refills: 0 | Status: DISCONTINUED | OUTPATIENT
Start: 2024-09-06 | End: 2024-09-22

## 2024-09-06 RX ORDER — SODIUM CHLORIDE 0.9 % (FLUSH) 0.9 %
500 SYRINGE (ML) INJECTION ONCE
Refills: 0 | Status: COMPLETED | OUTPATIENT
Start: 2024-09-06 | End: 2024-09-06

## 2024-09-06 RX ORDER — SODIUM CHLORIDE IRRIG SOLUTION 0.9 %
1000 SOLUTION, IRRIGATION IRRIGATION ONCE
Refills: 0 | Status: COMPLETED | OUTPATIENT
Start: 2024-09-06 | End: 2024-09-06

## 2024-09-06 RX ORDER — FUROSEMIDE 10 MG/ML
80 INJECTION INTRAVENOUS ONCE
Refills: 0 | Status: DISCONTINUED | OUTPATIENT
Start: 2024-09-06 | End: 2024-09-06

## 2024-09-06 RX ORDER — ATORVASTATIN CALCIUM 10 MG/1
1 TABLET, FILM COATED ORAL
Refills: 0 | DISCHARGE

## 2024-09-06 RX ORDER — NOREPINEPHRINE BITARTRATE/D5W 16MG/250ML
0.05 PLASTIC BAG, INJECTION (ML) INTRAVENOUS
Qty: 8 | Refills: 0 | Status: DISCONTINUED | OUTPATIENT
Start: 2024-09-06 | End: 2024-09-06

## 2024-09-06 RX ORDER — FOLIC ACID 1 MG/1
1 TABLET ORAL
Refills: 0 | DISCHARGE

## 2024-09-06 RX ORDER — SODIUM BICARBONATE 650 MG
0.45 TABLET ORAL
Qty: 150 | Refills: 0 | Status: DISCONTINUED | OUTPATIENT
Start: 2024-09-06 | End: 2024-09-06

## 2024-09-06 RX ADMIN — ERTAPENEM 100 MILLIGRAM(S): 1 INJECTION, POWDER, LYOPHILIZED, FOR SOLUTION INTRAMUSCULAR; INTRAVENOUS at 16:35

## 2024-09-06 RX ADMIN — Medication 400 MILLIGRAM(S): at 04:51

## 2024-09-06 RX ADMIN — Medication 250 MILLILITER(S): at 07:08

## 2024-09-06 RX ADMIN — Medication 5000 UNIT(S): at 21:33

## 2024-09-06 RX ADMIN — Medication 150 MEQ/KG/HR: at 00:20

## 2024-09-06 RX ADMIN — Medication 1000 MILLIGRAM(S): at 22:09

## 2024-09-06 RX ADMIN — PIPERACILLIN SODIUM AND TAZOBACTAM SODIUM 200 GRAM(S): 12; 1.5 INJECTION, POWDER, LYOPHILIZED, FOR SOLUTION INTRAVENOUS at 03:47

## 2024-09-06 RX ADMIN — Medication 400 MILLIGRAM(S): at 16:15

## 2024-09-06 RX ADMIN — Medication 1000 MILLIGRAM(S): at 05:00

## 2024-09-06 RX ADMIN — Medication 1000 MILLILITER(S): at 10:00

## 2024-09-06 RX ADMIN — PIPERACILLIN SODIUM AND TAZOBACTAM SODIUM 25 GRAM(S): 12; 1.5 INJECTION, POWDER, LYOPHILIZED, FOR SOLUTION INTRAVENOUS at 12:45

## 2024-09-06 RX ADMIN — Medication 400 MILLIGRAM(S): at 21:31

## 2024-09-06 RX ADMIN — Medication 150 MEQ/KG/HR: at 08:28

## 2024-09-06 RX ADMIN — Medication 150 MEQ/KG/HR: at 02:53

## 2024-09-06 RX ADMIN — Medication 750 MILLILITER(S): at 04:23

## 2024-09-06 RX ADMIN — CHLORHEXIDINE GLUCONATE ORAL RINSE 1 APPLICATION(S): 1.2 SOLUTION DENTAL at 12:45

## 2024-09-06 RX ADMIN — Medication 50 MILLILITER(S): at 00:05

## 2024-09-06 RX ADMIN — Medication 5000 UNIT(S): at 05:30

## 2024-09-06 RX ADMIN — Medication 1000 MILLIGRAM(S): at 16:45

## 2024-09-06 RX ADMIN — Medication 5 UNIT(S): at 00:04

## 2024-09-06 RX ADMIN — PIPERACILLIN SODIUM AND TAZOBACTAM SODIUM 3.38 GRAM(S): 12; 1.5 INJECTION, POWDER, LYOPHILIZED, FOR SOLUTION INTRAVENOUS at 15:30

## 2024-09-06 RX ADMIN — Medication 5000 UNIT(S): at 14:57

## 2024-09-06 RX ADMIN — Medication 5.92 MICROGRAM(S)/KG/MIN: at 08:28

## 2024-09-06 RX ADMIN — Medication 200 GRAM(S): at 01:20

## 2024-09-06 NOTE — PATIENT PROFILE ADULT - FUNCTIONAL ASSESSMENT - BASIC MOBILITY 6.
1-calculated by average/Not able to assess (calculate score using [facility] averaging method)  1-calculated by average/Not able to assess (calculate score using [facility] averaging method)

## 2024-09-06 NOTE — CHART NOTE - NSCHARTNOTEFT_GEN_A_CORE
Critically ill patient requiring ABG to determine respiratory status.  This was an emergent procedure.  Patients radial artery was palpated/ visualized with US and cleaned with alcohol pad.   23g x 3/4" x 12" butterfly needed was inserted into the left/right radial artery with pulsatile flash.  One attempt was performed.  3cc of blood was obtained from patient.  Gauze pad was placed over site, needle withdrawn and firm pressure was held until complete hemostasis was achieved and band-aid was applied.  Patient tolerated procedure well with no complications.        [facility] RPA C [id number]

## 2024-09-06 NOTE — PROGRESS NOTE ADULT - ASSESSMENT
57yo woman, Hungarian speaking, h/o rheumatoid arthritis on MTX, HTN, HLD, prediabetic, and GERD who presents for generalized weakness, AMS, fever/chill for 3-4 days. Recent UTI treated with Cipro outpatient 10 days ago. CT c/f cystitis with ascending ureteritis and pyelonephritis. Pt found to have hyperkalemia, acidosis, and symptoms c/f acute renal failure. Admitted to MICU possible urgent HD.      ===================NEURO===================  AAOx0 - unknown baseline  #Lethargic, altered mental status   Likely 2/2 to complicated UTI vs. uremic encephalopathy vs. acidosis  >c/w bicarb gtt  >f/u UAx BCx   >c/w zosyn    ==================CARDIAC===================  #HTN  #HLD  on home losartan, atorvastatin  >holding home meds    ===================PULM====================  #Acidotic with respiratory compensation  increased WOB in the ED  >c/w BiPAP    ===================RENAL====================  #acute renal failure  #metabolic acidosis  #hyperkalemia  Scr 7.02, repeat 6.9  K 5.9  Likely 2/2 to complicated UTI vs. hypovolemic (decreased po intake, diarrhea iso misused miralax)    >c/w bicarb gtt  >monitor electrolytes  >if symptoms not improved, consider HD  >Nephro following    =====================GI======================  #Diet  NPO while on BiPAP    #GERD  on home dexlansoprazole   >hold home meds    ===================HEME/ONC=================  #RA  on home MTX 2.5 mg 6 tablets a week  last took on 9/2  #DVT ppx  Hep subQ    ======================ID========================  #recent UTI treated with Cipro (incomplete, self-discontinued 3-4 days ago)  #Cystitis with ascending ureteritis and pyelonephritis on CT  >c/w zosyn  >f/u UAx, BCx      =====================ENDO=================  #prediabetes (last A1c of 6.2)    DVT  Hep subq 57yo woman, Turkish speaking, h/o rheumatoid arthritis on MTX, HTN, HLD, prediabetic, and GERD who presents for generalized weakness, AMS, fever/chill for 3-4 days. Recent UTI treated with Cipro outpatient 10 days ago. CT c/f cystitis with ascending ureteritis and pyelonephritis. Pt found to have hyperkalemia, acidosis, and symptoms c/f acute renal failure. Admitted to MICU possible urgent HD.      ===================NEURO===================  AAOx0 - unknown baseline  #Lethargic, altered mental status   Likely 2/2 to complicated UTI vs. uremic encephalopathy vs. acidosis  >c/w bicarb gtt  >BCx (+): E.coli ESBL CTX resistant  >d/c zosyn  >start ertapenem 500mg qd (renal dose)    ==================CARDIAC===================  #HTN  #HLD  on home losartan, atorvastatin  >holding home meds    ===================PULM====================  #Acidotic with respiratory compensation  increased WOB in the ED  >c/w BiPAP    ===================RENAL====================  #acute renal failure  #metabolic acidosis  #hyperkalemia  Scr 7.02, repeat 6.9  K 5.9  Likely 2/2 to complicated UTI vs. hypovolemic (decreased po intake, diarrhea iso misused miralax)    >c/w bicarb gtt  >monitor electrolytes  >if symptoms not improved, consider HD  >Nephro following    =====================GI======================  #Diet  NPO while on BiPAP    #GERD  on home dexlansoprazole   >hold home meds    ===================HEME/ONC=================  #RA  on home MTX 2.5 mg 6 tablets a week  last took on 9/2  #DVT ppx  Hep subQ q8h    ======================ID========================  #recent UTI treated with Cipro (incomplete, self-discontinued 3-4 days ago)  #Cystitis with ascending ureteritis and pyelonephritis on CT  >c/w zosyn  >f/u UAx, BCx      =====================ENDO=================  #prediabetes (last A1c of 6.2)    =====================ETHICS=======================  Full Code

## 2024-09-06 NOTE — H&P ADULT - ATTENDING COMMENTS
59yo woman, Czech speaking, h/o rheumatoid arthritis on MTX, HTN, HLD, prediabetic, and GERD who presents for generalized weakness, AMS, fever/chill. In the ED, patient found to have urosepsis  with CT scan finding of cystitis with ascending ureteritis and pyelonephritis. Pt found to have hyperkalemia, acidosis, and symptoms in the setting of acute renal failure. Admitted to MICU possible urgent HD.  sepsis 2/2 UTI and pyelonephritis, pancx, Abx, procalcitonin, MRSA swab, possible need for Levophed to maintain MAP >65  hyperkalemia with metabolic acidosis and uremia, medical management of hyperkalemia for now, HCO3 gtt, serial BMP with VBG    renal consult for possible urgent HD  Christian strict I and O   Full code  DVT ppx heparin

## 2024-09-06 NOTE — H&P ADULT - NSHPLABSRESULTS_GEN_ALL_CORE
CT Abdomen and Pelvis w/ IV Cont (09.05.24 @ 21:38)     FINDINGS:  LOWER CHEST: Bibasilar subsegmental atelectasis.  LIVER: Within normal limits.  BILE DUCTS: Normal caliber.  GALLBLADDER: Within normal limits.  SPLEEN: Within normal limits.  PANCREAS: Within normal limits.  ADRENALS: Within normal limits.  KIDNEYS/URETERS: Bilateral striated renal nephrograms and mild perinephric fat stranding. Bilateral renal subcentimeter hypodensity   too small to characterize. No perinephric fluid collection or abscess. No hydronephrosis. Enhancement of the bilateral ureteral walls.  BLADDER: Mild wall thickening.  REPRODUCTIVE ORGANS: Uterus and adnexa within normal limits.  BOWEL: Small hiatal hernia. No bowel obstruction. Appendix is normal.  PERITONEUM/RETROPERITONEUM: Within normal limits.  VESSELS: Atherosclerotic changes.  LYMPH NODES: No lymphadenopathy.  ABDOMINAL WALL: Within normal limits.  BONES: Degenerative changes.    IMPRESSION:  Cystitis with ascending ureteritis and pyelonephritis.        Xray Chest 2 Views PA/Lat (09.05.24 @ 21:23)     FINDINGS:  The heart size is not accurately assessed on this projection.  The lungs are clear.  There is no pneumothorax or pleural effusion.    IMPRESSION:  Clear lungs.

## 2024-09-06 NOTE — PROGRESS NOTE ADULT - SUBJECTIVE AND OBJECTIVE BOX
Chilo Briceño MD PGY-1  ---------------------------------------------------------------------------------------------  Patient is a 58y old  Female who presents with a chief complaint of urgent HD (06 Sep 2024 01:47)      HPI:  Ms. Aggie Call is a 57yo woman, Lithuanian speaking, h/o rheumatoid arthritis on MTX, HTN, HLD, and GERD who presents for generalized weakness, AMS, fever/chill for 3-4 days. Per son, patient was given Cipro by PCP for symptoms c/f UTI about 10 days ago, after which she has been becoming more confused, lethargic, throat pain with worsening PO intake and self-discontinued medications for 3 days.    In the ED, pt was found to be febrile, tachypneic, /107, and increased WOB. Labs notable for K 6.9 (hemolyzed), repeat 5.9 with no significant change on EKG, Scr 6.86, VBG 7.23/30/35/12, repeat VBG 7.16/43/33/13. CT showed Cystitis with ascending ureteritis and pyelonephritis. Pt was given Lokelma, Ca-gluconate, and insulin/detrose for hyperkalemia, LR and Zosyn in the setting of UTI, and placed on bicarb ggt for acidosis.     Pt was admitted to MICU for possible urgent HD and continued monitoring given worsening acidosis on repeat vbg.   (06 Sep 2024 01:47)      SUBJECTIVE / OVERNIGHT EVENTS: ***      MEDICATIONS  (STANDING):  furosemide   Injectable 80 milliGRAM(s) IV Push once  heparin   Injectable 5000 Unit(s) SubCutaneous every 12 hours  norepinephrine Infusion 0.05 MICROgram(s)/kG/Min (5.92 mL/Hr) IV Continuous <Continuous>  piperacillin/tazobactam IVPB.- 3.375 Gram(s) IV Intermittent once  sodium bicarbonate  Infusion 0.335 mEq/kG/Hr (150 mL/Hr) IV Continuous <Continuous>    MEDICATIONS  (PRN):    Allergies    No Known Allergies    Intolerances        ICU Vital Signs Last 24 Hrs  T(F): 100.5 (06 Sep 2024 05:00), Max: 100.9 (05 Sep 2024 20:34)  HR: 69 (06 Sep 2024 07:49) (69 - 135)  BP: 97/49 (06 Sep 2024 06:00) (91/46 - 153/107)  BP(mean): 59 (06 Sep 2024 06:00) (55 - 67)  ABP: --  ABP(mean): --  RR: 21 (06 Sep 2024 06:00) (14 - 37)  SpO2: 100% (06 Sep 2024 07:49) (96% - 100%)      Physical Exam:   GENERAL: NAD, lying in bed comfortably  HEAD:  Atraumatic, Normocephalic  EYES: EOMI, PERRLA, conjunctiva and sclera clear  ENT: Moist mucous membranes  NECK: Supple, No JVD  CHEST/LUNG: Clear to auscultation bilaterally; No rales, rhonchi, wheezing, or rubs. Unlabored respirations  HEART: Regular rate and rhythm; Normal S1/S2. No murmurs, rubs, or gallops  ABDOMEN: Bowel sounds present; Soft, Nontender, Nondistended. No hepatomegaly  EXTREMITIES:  2+ Peripheral Pulses, brisk capillary refill. No clubbing, cyanosis, or edema  NERVOUS SYSTEM:  Alert & Oriented X3, speech clear. No deficits.  MSK: FROM all 4 extremities, full and equal strength  SKIN: No rashes or lesions    I&O's Summary    05 Sep 2024 07:01  -  06 Sep 2024 07:00  --------------------------------------------------------  IN: 1350 mL / OUT: 240 mL / NET: 1110 mL        LABS:                        7.9    10.17 )-----------( 77       ( 06 Sep 2024 06:00 )             22.6     09-06    133<L>  |  98  |  138<H>  ----------------------------<  221<H>  5.1   |  14<L>  |  6.52<H>    Ca    8.1<L>      06 Sep 2024 06:00  Phos  8.3     09-06  Mg     3.00     09-06    TPro  7.1  /  Alb  3.0<L>  /  TBili  0.7  /  DBili  x   /  AST  37<H>  /  ALT  26  /  AlkPhos  103  09-05    PT/INR - ( 05 Sep 2024 20:45 )   PT: 13.4 sec;   INR: 1.20 ratio         PTT - ( 05 Sep 2024 20:45 )  PTT:23.2 sec  ABG - ( 06 Sep 2024 06:31 )  pH, Arterial: 7.45  pH, Blood: x     /  pCO2: 22    /  pO2: 189   / HCO3: 15    / Base Excess: -7.8  /  SaO2: 98.0                Venous: 09-06-24 @ 02:20 FiO2: -- Oxygen Sat% 98.3  Venous: 09-06-24 @ 00:05 FiO2: -- Oxygen Sat% 38.8  Venous: 09-05-24 @ 20:45 FiO2: -- Oxygen Sat% 46.8    Urinalysis Basic - ( 06 Sep 2024 06:00 )    Color: x / Appearance: x / SG: x / pH: x  Gluc: 221 mg/dL / Ketone: x  / Bili: x / Urobili: x   Blood: x / Protein: x / Nitrite: x   Leuk Esterase: x / RBC: x / WBC x   Sq Epi: x / Non Sq Epi: x / Bacteria: x          CAPILLARY BLOOD GLUCOSE      POCT Blood Glucose.: 135 mg/dL (05 Sep 2024 23:27)      RADIOLOGY & ADDITIONAL TESTS:  Results Reviewed:   Imaging Personally Reviewed:  Electrocardiogram Personally Reviewed: Chilo Briceño MD PGY-1  ---------------------------------------------------------------------------------------------  Patient is a 58y old  Female who presents with a chief complaint of urgent HD (06 Sep 2024 01:47)      HPI:  Ms. Aggie Call is a 59yo woman, Romanian speaking, h/o rheumatoid arthritis on MTX, HTN, HLD, and GERD who presents for generalized weakness, AMS, fever/chill for 3-4 days. Per son, patient was given Cipro by PCP for symptoms c/f UTI about 10 days ago, after which she has been becoming more confused, lethargic, throat pain with worsening PO intake and self-discontinued medications for 3 days.    In the ED, pt was found to be febrile, tachypneic, /107, and increased WOB. Labs notable for K 6.9 (hemolyzed), repeat 5.9 with no significant change on EKG, Scr 6.86, VBG 7.23/30/35/12, repeat VBG 7.16/43/33/13. CT showed Cystitis with ascending ureteritis and pyelonephritis. Pt was given Lokelma, Ca-gluconate, and insulin/detrose for hyperkalemia, LR and Zosyn in the setting of UTI, and placed on bicarb ggt for acidosis.     Pt was admitted to MICU for possible urgent HD and continued monitoring given worsening acidosis on repeat vbg.   (06 Sep 2024 01:47)      SUBJECTIVE / OVERNIGHT EVENTS:   Pt in AM mentation improving, denies any pain. Continue to monitor urine output.       MEDICATIONS  (STANDING):  furosemide   Injectable 80 milliGRAM(s) IV Push once  heparin   Injectable 5000 Unit(s) SubCutaneous every 12 hours  norepinephrine Infusion 0.05 MICROgram(s)/kG/Min (5.92 mL/Hr) IV Continuous <Continuous>  piperacillin/tazobactam IVPB.- 3.375 Gram(s) IV Intermittent once  sodium bicarbonate  Infusion 0.335 mEq/kG/Hr (150 mL/Hr) IV Continuous <Continuous>    MEDICATIONS  (PRN):    Allergies    No Known Allergies    Intolerances        ICU Vital Signs Last 24 Hrs  T(F): 100.5 (06 Sep 2024 05:00), Max: 100.9 (05 Sep 2024 20:34)  HR: 69 (06 Sep 2024 07:49) (69 - 135)  BP: 97/49 (06 Sep 2024 06:00) (91/46 - 153/107)  BP(mean): 59 (06 Sep 2024 06:00) (55 - 67)  ABP: --  ABP(mean): --  RR: 21 (06 Sep 2024 06:00) (14 - 37)  SpO2: 100% (06 Sep 2024 07:49) (96% - 100%)      Physical Exam:   GENERAL: ill appearing  CHEST/LUNG: Breathing comfortably on BiPAP  HEART: Regular rate and rhythm; Normal S1/S2. No murmurs, rubs, or gallops  ABDOMEN: Bowel sounds present; Soft, Nontender, Nondistended. No hepatomegaly  EXTREMITIES:  2+ Peripheral Pulses, trace edema  NERVOUS SYSTEM:  AAOx2-3    I&O's Summary    05 Sep 2024 07:01  -  06 Sep 2024 07:00  --------------------------------------------------------  IN: 1350 mL / OUT: 240 mL / NET: 1110 mL        LABS:                        7.9    10.17 )-----------( 77       ( 06 Sep 2024 06:00 )             22.6     09-06    133<L>  |  98  |  138<H>  ----------------------------<  221<H>  5.1   |  14<L>  |  6.52<H>    Ca    8.1<L>      06 Sep 2024 06:00  Phos  8.3     09-06  Mg     3.00     09-06    TPro  7.1  /  Alb  3.0<L>  /  TBili  0.7  /  DBili  x   /  AST  37<H>  /  ALT  26  /  AlkPhos  103  09-05    PT/INR - ( 05 Sep 2024 20:45 )   PT: 13.4 sec;   INR: 1.20 ratio         PTT - ( 05 Sep 2024 20:45 )  PTT:23.2 sec  ABG - ( 06 Sep 2024 06:31 )  pH, Arterial: 7.45  pH, Blood: x     /  pCO2: 22    /  pO2: 189   / HCO3: 15    / Base Excess: -7.8  /  SaO2: 98.0                Venous: 09-06-24 @ 02:20 FiO2: -- Oxygen Sat% 98.3  Venous: 09-06-24 @ 00:05 FiO2: -- Oxygen Sat% 38.8  Venous: 09-05-24 @ 20:45 FiO2: -- Oxygen Sat% 46.8    Urinalysis Basic - ( 06 Sep 2024 06:00 )    Color: x / Appearance: x / SG: x / pH: x  Gluc: 221 mg/dL / Ketone: x  / Bili: x / Urobili: x   Blood: x / Protein: x / Nitrite: x   Leuk Esterase: x / RBC: x / WBC x   Sq Epi: x / Non Sq Epi: x / Bacteria: x          CAPILLARY BLOOD GLUCOSE      POCT Blood Glucose.: 135 mg/dL (05 Sep 2024 23:27)      RADIOLOGY & ADDITIONAL TESTS:  Results Reviewed:   Imaging Personally Reviewed:  Electrocardiogram Personally Reviewed:

## 2024-09-06 NOTE — H&P ADULT - NSHPPHYSICALEXAM_GEN_ALL_CORE
GENERAL: NAD, lying in bed with increased WOB.   HEAD:  Atraumatic, normocephalic  EYES: EOMI, PERRLA, conjunctiva and sclera clear  ENT: Moist mucous membranes  NECK: Supple, no JVD  HEART: Regular rate and rhythm, no murmurs, rubs, or gallops  LUNGS: Increased WOB, wheezing bilaterally  ABDOMEN: Soft, nontender, nondistended, +BS  EXTREMITIES: 2+ peripheral pulses bilaterally. Mild 1+ edema to shins bilaterally.   NERVOUS SYSTEM:  A&Ox3, no focal deficits   SKIN: No rashes or lesions

## 2024-09-06 NOTE — PATIENT PROFILE ADULT - FALL HARM RISK - RISK INTERVENTIONS

## 2024-09-06 NOTE — ED ADULT NURSE REASSESSMENT NOTE - NS ED NURSE REASSESS COMMENT FT1
IVF discontinued due to patient worsening work of breathing. Providers at bedside.
Patient is improving while on BiPAP. Fio2 30%. All vital signs are improving. Sinus tachy remains. Sodium Bicard infusing. Awaiting bed on MICU.
Patient seems to be working a little harder to breath, Dr. Ferrera and Resident Veronica made aware of same. Lactated ringers ordered for patient. Confirmed with provider that they still wanted fluids on patient since she has basal crackles. Dr. Ferrera said patient should still get fluids. Fluids scanned and started. Will monitor patient closely.
Repeat BMP and VBG drawn and sent. Patient is improving. HR and BP came lower that before. Son at bedside.
Patient noted to be worsening. Attending at beside speaking with family member. Patient is shivering with mottled skins. Patient placed on 3L O2 via NC. Patient pending BiPAP machine to help with breathing and acidosis. Patient remains awake and alert. Patient medicated for hyperkalemia (insulin & dextrose). v/s done and charted.

## 2024-09-06 NOTE — H&P ADULT - HISTORY OF PRESENT ILLNESS
Ms. Aggie Call is a 59yo woman, Spanish speaking, h/o rheumatoid arthritis on MTX, HTN, HLD, and GERD who presents for generalized weakness, AMS, fever/chill for 3-4 days. Per son, patient was given Cipro by PCP for symptoms c/f UTI about 10 days ago, after which she has been becoming more confused, lethargic, throat pain with worsening PO intake and self-discontinued medications for 3 days.    In the ED, pt was found to be febrile, tachypneic, /107, and increased WOB. Labs notable for K 6.9 (hemolyzed), repeat 5.9 with no significant change on EKG, Scr 6.86, VBG 7.23/30/35/12, repeat VBG 7.16/43/33/13. CT showed Cystitis with ascending ureteritis and pyelonephritis. Pt was given Lokelma, Ca-gluconate, and insulin/detrose for hyperkalemia, LR and Zosyn in the setting of UTI, and placed on bicarb ggt for acidosis.     Pt was admitted to MICU for possible urgent HD and continued monitoring given worsening acidosis on repeat vbg.

## 2024-09-06 NOTE — PROGRESS NOTE ADULT - ATTENDING COMMENTS
1.Hypotension from severe sepsis with shock due to pyelonephritis and gram negative bacteremia. Taper Levophed for MAP> 65  2. ID. Pyelonephritis and gram negative lucie bacteremia. UTI. Continue Zosyn. Await all cx  final results.  3. Renal: LINA from ATN . Pt with anion gap metabolic acidosis. POCUS reveals very small IVC.  Give additional liter of LR. Increase bicarb drip to 200mls/hr. No need for RRT at this point. K~5.  4. Pulmonary. Acute hypoxemic respiratory failure. Mainly on BiPAP due to increase work of breathing. Will ATTEMPT TO WEAN OFF ONCE ACIDOSIS BETTER CORRECTED.  6. DVT prophylaxis: SQ heparin  7. GOC; Full code.

## 2024-09-06 NOTE — CONSULT NOTE ADULT - SUBJECTIVE AND OBJECTIVE BOX
[facility] DIVISION OF KIDNEY DISEASES AND HYPERTENSION -- [phone]  -- INITIAL CONSULT NOTE  --------------------------------------------------------------------------------  HPI:  57 yo female with PMH of hypertension, prediabetes, arthritis (excessive use of Tylenol) and a recent UTI treated with Cripro comes to ER with AMS, fever, chills, fatigue x3-4 days. As per ER discussion with another son pt was also having n/v and diarrhea.     History obtained from patient's son at bedside and daughter on phone. Pt has been in her usual state of health until about 10 days ago (was ambulating independently and AnOx3), when she developed UTI and was given Cipro, for last 3 to 4 days she has been becoming more confused, lethargic with worsening PO intake and sent to ER. Labs at PCP in July were wnl in terms of kidneys and was told that her HbA1c gone up to 6.3% making her pre-diabetic.       Upon my encounter pt, lethargic on BiPAP but responsive to verbal and tactile stimuli. In ER pt noticed to fever 100.9. Worsening tachypnea -placed on BiPAP. 's/80's. HR 90's. Labs significant for K 6.9 hemolyzed repeat 5.9 (with no EKG changes); BUN/Cr 143/7.02 >>>S/p LR >>>repeat BUN/Cr  140/6.86 slight improvement). CT A/p with IV contrast showing cystitis w pyelonephritis. SOC2 12 >> 13. pH 7.23 >> 7.16 (with resp component).     Pt received Lokelma 10gm, 1L LR, Zosyn, Ca-gluconate and Insulin in ER.     Nephrology consulted for LINA.       PAST HISTORY  --------------------------------------------------------------------------------  PAST MEDICAL & SURGICAL HISTORY:  HTN (hypertension)        FAMILY HISTORY:    PAST SOCIAL HISTORY:    ALLERGIES & MEDICATIONS  --------------------------------------------------------------------------------  Allergies    No Known Allergies    Intolerances      Standing Inpatient Medications  calcium gluconate IVPB 2 Gram(s) IV Intermittent Once  sodium bicarbonate  Infusion 0.335 mEq/kG/Hr IV Continuous <Continuous>    PRN Inpatient Medications      REVIEW OF SYSTEMS  --------------------------------------------------------------------------------  unable to obtain      VITALS/PHYSICAL EXAM  --------------------------------------------------------------------------------  T(C): 37 (09-06-24 @ 00:14), Max: 38.3 (09-05-24 @ 20:34)  HR: 132 (09-06-24 @ 01:07) (81 - 135)  BP: 146/71 (09-06-24 @ 00:14) (104/68 - 153/107)  RR: 22 (09-06-24 @ 00:14) (18 - 37)  SpO2: 100% (09-06-24 @ 01:07) (96% - 100%)  Wt(kg): --  Height (cm): 157.5 (09-05-24 @ 17:29)  Weight (kg): 67.086 (09-05-24 @ 17:29)  BMI (kg/m2): 27 (09-05-24 @ 17:29)  BSA (m2): 1.68 (09-05-24 @ 17:29)        Physical Exam:  	Gen: NAD  	HEENT: Anicteric  	Pulm: CTA B/L  	CV: S1S2+  	Abd: Soft, +BS    	Ext: No LE edema B/L  	Neuro: Lethargic           : Christian+ with clear urine in the bag  	Skin: Warm and dry    LABS/STUDIES  --------------------------------------------------------------------------------              10.7   8.36  >-----------<  139      [09-05-24 @ 20:45]              31.4     132  |  99  |  140  ----------------------------<  152      [09-05-24 @ 22:30]  5.9   |  13  |  6.86        Ca     7.9     [09-05-24 @ 22:30]    TPro  7.1  /  Alb  3.0  /  TBili  0.7  /  DBili  x   /  AST  37  /  ALT  26  /  AlkPhos  103  [09-05-24 @ 20:45]    PT/INR: PT 13.4 , INR 1.20       [09-05-24 @ 20:45]  PTT: 23.2       [09-05-24 @ 20:45]      Creatinine Trend:  SCr 6.86 [09-05 @ 22:30]  SCr 7.02 [09-05 @ 20:45]    Urinalysis - [09-05-24 @ 22:30]      Color Yellow / Appearance Cloudy / SG 1.014 / pH 5.5      Gluc Negative / Ketone Negative  / Bili Negative / Urobili 0.2       Blood Moderate / Protein 30 / Leuk Est Large / Nitrite Positive      RBC 1 /  / Hyaline  / Gran  / Sq Epi  / Non Sq Epi 0 / Bacteria Many          Tacrolimus  Cyclosporine  Sirolimus  Mycophenolate  BK PCR  CMV PCR  Parvo PCR  EBV PCR [facility] DIVISION OF KIDNEY DISEASES AND HYPERTENSION -- [phone]  -- INITIAL CONSULT NOTE  --------------------------------------------------------------------------------  HPI:  57 yo female with PMH of hypertension, prediabetes, arthritis (excessive use of Tylenol) and a recent UTI treated with Cripro comes to ER with AMS, fever, chills, fatigue x3-4 days. As per ER discussion with another son pt was also having n/v and diarrhea.     History obtained from patient's son at bedside and daughter on phone. Pt has been in her usual state of health until about 10 days ago (was ambulating independently and AnOx3), when she developed UTI and was given Cipro, for last 3 to 4 days she has been becoming more confused, lethargic with worsening PO intake and sent to ER. Labs at PCP in July were wnl in terms of kidneys and was told that her HbA1c gone up to 6.3% making her pre-diabetic.       Upon my encounter pt, lethargic on BiPAP but responsive to verbal and tactile stimuli. In ER pt noticed to fever 100.9. Worsening tachypnea -placed on BiPAP. 's/80's. HR 90's. Labs significant for K 6.9 hemolyzed repeat 5.9 (with no EKG changes); BUN/Cr 143/7.02 >>>S/p LR >>>repeat BUN/Cr  140/6.86 slight improvement). CT A/p with IV contrast showing cystitis w pyelonephritis. SOC2 12 >> 13. pH 7.23 >> 7.16 (with resp component).     Pt received Lokelma 10gm, 1L LR, Zosyn, Ca-gluconate and Insulin in ER.     Nephrology consulted for LINA.       PAST HISTORY  --------------------------------------------------------------------------------  PAST MEDICAL & SURGICAL HISTORY:  HTN (hypertension)        FAMILY HISTORY: Reviewed and non contributory    PAST SOCIAL HISTORY: No smoking, drugs or alcohol use    ALLERGIES & MEDICATIONS  --------------------------------------------------------------------------------  Allergies    No Known Allergies    Intolerances      Standing Inpatient Medications  calcium gluconate IVPB 2 Gram(s) IV Intermittent Once  sodium bicarbonate  Infusion 0.335 mEq/kG/Hr IV Continuous <Continuous>    PRN Inpatient Medications      REVIEW OF SYSTEMS  --------------------------------------------------------------------------------  unable to obtain      VITALS/PHYSICAL EXAM  --------------------------------------------------------------------------------  T(C): 37 (09-06-24 @ 00:14), Max: 38.3 (09-05-24 @ 20:34)  HR: 132 (09-06-24 @ 01:07) (81 - 135)  BP: 146/71 (09-06-24 @ 00:14) (104/68 - 153/107)  RR: 22 (09-06-24 @ 00:14) (18 - 37)  SpO2: 100% (09-06-24 @ 01:07) (96% - 100%)  Wt(kg): --  Height (cm): 157.5 (09-05-24 @ 17:29)  Weight (kg): 67.086 (09-05-24 @ 17:29)  BMI (kg/m2): 27 (09-05-24 @ 17:29)  BSA (m2): 1.68 (09-05-24 @ 17:29)        Physical Exam:  	Gen: NAD  	HEENT: Anicteric  	Pulm: CTA B/L  	CV: S1S2+  	Abd: Soft, +BS    	Ext: No LE edema B/L  	Neuro: Lethargic           : Christian+ with clear urine in the bag  	Skin: Warm and dry    LABS/STUDIES  --------------------------------------------------------------------------------              10.7   8.36  >-----------<  139      [09-05-24 @ 20:45]              31.4     132  |  99  |  140  ----------------------------<  152      [09-05-24 @ 22:30]  5.9   |  13  |  6.86        Ca     7.9     [09-05-24 @ 22:30]    TPro  7.1  /  Alb  3.0  /  TBili  0.7  /  DBili  x   /  AST  37  /  ALT  26  /  AlkPhos  103  [09-05-24 @ 20:45]    PT/INR: PT 13.4 , INR 1.20       [09-05-24 @ 20:45]  PTT: 23.2       [09-05-24 @ 20:45]      Creatinine Trend:  SCr 6.86 [09-05 @ 22:30]  SCr 7.02 [09-05 @ 20:45]    Urinalysis - [09-05-24 @ 22:30]      Color Yellow / Appearance Cloudy / SG 1.014 / pH 5.5      Gluc Negative / Ketone Negative  / Bili Negative / Urobili 0.2       Blood Moderate / Protein 30 / Leuk Est Large / Nitrite Positive      RBC 1 /  / Hyaline  / Gran  / Sq Epi  / Non Sq Epi 0 / Bacteria Many          Tacrolimus  Cyclosporine  Sirolimus  Mycophenolate  BK PCR  CMV PCR  Parvo PCR  EBV PCR

## 2024-09-06 NOTE — H&P ADULT - ASSESSMENT
59yo woman, Serbian speaking, h/o rheumatoid arthritis on MTX, HTN, HLD, prediabetic, and GERD who presents for generalized weakness, AMS, fever/chill for 3-4 days. Recent UTI treated with Cipro outpatient 10 days ago. CT c/f cystitis with ascending ureteritis and pyelonephritis. Pt found to have hyperkalemia, acidosis, and symptoms c/f acute renal failure. Admitted to MICU possible urgent HD.      ==========NEURO==========  AAOx0 - unknown baseline  #Lethargic, altered mental status   Likely 2/2 to complicated UTI vs. uremic encephalopathy vs. acidosis  >c/w bicarb gtt  >?f/u UAx   >s/p zosyn    ==========CARDIAC==========  #HTN  on home ?losartan  >held home meds given soft bp    ==========PULM===========  #Acidotic with   ==========RENAL==========    ==========GI==========    ==========HEME/ONC==========    ===ID===    ===ENDO===    DVT 59yo woman, Wolof speaking, h/o rheumatoid arthritis on MTX, HTN, HLD, prediabetic, and GERD who presents for generalized weakness, AMS, fever/chill for 3-4 days. Recent UTI treated with Cipro outpatient 10 days ago. CT c/f cystitis with ascending ureteritis and pyelonephritis. Pt found to have hyperkalemia, acidosis, and symptoms c/f acute renal failure. Admitted to MICU possible urgent HD.      ===================NEURO===================  AAOx0 - unknown baseline  #Lethargic, altered mental status   Likely 2/2 to complicated UTI vs. uremic encephalopathy vs. acidosis  >c/w bicarb gtt  >?f/u UAx   >s/p zosyn    ==================CARDIAC===================  #HTN  #HLD  on home losartan, atorvastatin  >c/w home meds    ===================PULM====================  #Acidotic with respiratory compensation  increased WOB in the ED  >c/w BiPAP    ===================RENAL====================  #acute renal failure  #metabolic acidosis  #hyperkalemia  Scr 7.02, repeat 6.9  K 5.9  Likely 2/2 to complicated UTI vs. hypovolemic (decreased po intake, diarrhea iso misused miralax)    >c/w bicarb gtt  >monitor electrolytes  >if symptoms not improved, consider HD    =====================GI======================  #Diet  NPO while on BiPAP    #GERD  on home dexlansoprazole   >?pantoprazole     ===================HEME/ONC=================  #RA  on home MTX 2.5 mg 6 tablets a week  last took on 9/2  #DVT ppx  Hep subQ  ===ID===  #recent UTI treated with Cipro (incomplete, self-discontinued 3-4 days ago)  #Cystitis with ascending ureteritis and pyelonephritis on CT  >s/p Zosyn in the ED  >f/u UA?  >empiric abx?    =====================ENDO=================  #prediabetes (last A1c of 6.2)    DVT  Hep subq 59yo woman, Hebrew speaking, h/o rheumatoid arthritis on MTX, HTN, HLD, prediabetic, and GERD who presents for generalized weakness, AMS, fever/chill for 3-4 days. Recent UTI treated with Cipro outpatient 10 days ago. CT c/f cystitis with ascending ureteritis and pyelonephritis. Pt found to have hyperkalemia, acidosis, and symptoms c/f acute renal failure. Admitted to MICU possible urgent HD.      ===================NEURO===================  AAOx0 - unknown baseline  #Lethargic, altered mental status   Likely 2/2 to complicated UTI vs. uremic encephalopathy vs. acidosis  >c/w bicarb gtt  >f/u UAx BCx   >c/w zosyn    ==================CARDIAC===================  #HTN  #HLD  on home losartan, atorvastatin  >holding home meds    ===================PULM====================  #Acidotic with respiratory compensation  increased WOB in the ED  >c/w BiPAP    ===================RENAL====================  #acute renal failure  #metabolic acidosis  #hyperkalemia  Scr 7.02, repeat 6.9  K 5.9  Likely 2/2 to complicated UTI vs. hypovolemic (decreased po intake, diarrhea iso misused miralax)    >c/w bicarb gtt  >monitor electrolytes  >if symptoms not improved, consider HD  >Nephro following    =====================GI======================  #Diet  NPO while on BiPAP    #GERD  on home dexlansoprazole   >hold home meds    ===================HEME/ONC=================  #RA  on home MTX 2.5 mg 6 tablets a week  last took on 9/2  #DVT ppx  Hep subQ    ======================ID========================  #recent UTI treated with Cipro (incomplete, self-discontinued 3-4 days ago)  #Cystitis with ascending ureteritis and pyelonephritis on CT  >c/w zosyn  >f/u UAx, BCx      =====================ENDO=================  #prediabetes (last A1c of 6.2)    DVT  Hep subq

## 2024-09-06 NOTE — CONSULT NOTE ADULT - ASSESSMENT
59 yo female with PMH of hypertension, prediabetes, arthritis (excessive use of Tylenol) and a recent UTI treated with Cripro comes to ER with AMS, fever, chills and fatigue x3-4 days.    Nephrology consulted for LINA.         LINA iso Infection (pyelo); decreased PO intake, vomiting and diarrhea  Hyperkalemia iso Renal failure  Met Acidosis iso Renal failure    -Labs at PCP in July were wnl in terms of kidneys as per daughter. BUN/Cr 143/7.02 >>>S/p LR >>>repeat BUN/Cr  140/6.86 slight improvement). In ER pt noticed to fever 100.9. Worsening tachypnea placed on BiPAP. 's/80's. HR 90's. K 6.9 hemolyzed repeat 5.9 (with no EKG changes); SOC2 12 >> 13. pH 7.23 >> 7.16 (with resp component). CT A/p with IV Contrast (9/6) showing cystitis w pyelonephritis. Pt received Lokelma 10gm, 1L LR, Zosyn, Ca-gluconate and Insulin in ER. Christian paced in ER showing   -Recs:  -Give lasix 80 mg IV x 1; agree with bicarb gtt for now. Repeat labs STAT. If no improvement in K >>will need urgent HD. Consent obtained and placed on chart. Will hold off on another lokelma given pt on BiPAP. Pt received contrast (9/6). If repeat labs showing no improvement on worsening>> re-consult MICU for urgent HD.         Sidney Kent  Nephrology Fellow  Feel free to contact me on TEAMS  After 5 pm and on weekends please contact the on-call Fellow.       59 yo female with PMH of hypertension, prediabetes, arthritis (excessive use of Tylenol) and a recent UTI treated with Cripro comes to ER with AMS, fever, chills and fatigue x3-4 days.    Nephrology consulted for LINA.         LINA iso Infection (pyelo); decreased PO intake, vomiting and diarrhea  Hyperkalemia iso Renal failure  Met Acidosis iso Renal failure    -Labs at PCP in July were wnl in terms of kidneys as per daughter. BUN/Cr 143/7.02 >>>S/p LR >>>repeat BUN/Cr  140/6.86 slight improvement). In ER pt noticed to fever 100.9. Worsening tachypnea placed on BiPAP. 's/80's. HR 90's. K 6.9 hemolyzed repeat 5.9 (with no EKG changes); SOC2 12 >> 13. pH 7.23 >> 7.16 (with resp component). CT A/p with IV Contrast (9/6) showing cystitis w pyelonephritis. Pt received Lokelma 10gm, 1L LR, Zosyn, Ca-gluconate and Insulin in ER. Christian paced in ER showing   -Recs:  -Give lasix 80 mg IV x 1; agree with bicarb gtt for now. Repeat labs STAT. If no improvement in K >>will need urgent HD. Consent obtained and placed on chart. Will hold off on another lokelma given pt on BiPAP. Broad spectrum abx, cultures. Pt received contrast (9/6). If repeat labs showing no improvement on worsening>> re-consult MICU for urgent HD.         Sidney Kent  Nephrology Fellow  Feel free to contact me on TEAMS  After 5 pm and on weekends please contact the on-call Fellow.       57 yo female with PMH of hypertension, prediabetes, arthritis (excessive use of Tylenol) and a recent UTI treated with Cripro comes to ER with AMS, fever, chills and fatigue x3-4 days.    Nephrology consulted for LINA.         LINA iso Infection (pyelo); decreased PO intake, vomiting and diarrhea  Hyperkalemia iso Renal failure  Met Acidosis iso Renal failure    -Labs at PCP in July were wnl in terms of kidneys as per daughter. BUN/Cr 143/7.02 >>>S/p LR >>>repeat BUN/Cr  140/6.86 slight improvement). In ER pt noticed to fever 100.9. Worsening tachypnea placed on BiPAP. 's/80's. HR 90's. K 6.9 hemolyzed repeat 5.9 (with no EKG changes); SOC2 12 >> 13. pH 7.23 >> 7.16 (with resp component). CT A/p with IV Contrast (9/6) showing cystitis w pyelonephritis. Pt received Lokelma 10gm, 1L LR, Zosyn, Ca-gluconate and Insulin in ER. Christian paced in ER showing 250cc urine. Upon my encounter light yellow urine in bag.  -Recs:  -Give lasix 80 mg IV x 1; agree with bicarb gtt for now. If no improvement in K >>will need urgent HD. Consent obtained and placed on chart. Repeat VBG showing K 6.6 S/p shifting with insulin, and Ca-gluconate. Will hold off on another lokelma given pt on BiPAP. Broad spectrum abx, cultures. Pt received contrast (9/6). Repeat labs STAT. If repeat labs showing no improvement on worsening>> re-consult MICU for urgent HD.         Sidney Kent  Nephrology Fellow  Feel free to contact me on TEAMS  After 5 pm and on weekends please contact the on-call Fellow.       Alert and oriented to person, place and time

## 2024-09-07 DIAGNOSIS — N17.9 ACUTE KIDNEY FAILURE, UNSPECIFIED: ICD-10-CM

## 2024-09-07 LAB
ALBUMIN SERPL ELPH-MCNC: 2.5 G/DL — LOW (ref 3.3–5)
ALBUMIN SERPL ELPH-MCNC: 2.6 G/DL — LOW (ref 3.3–5)
ALP SERPL-CCNC: 104 U/L — SIGNIFICANT CHANGE UP (ref 40–120)
ALP SERPL-CCNC: 99 U/L — SIGNIFICANT CHANGE UP (ref 40–120)
ALT FLD-CCNC: 16 U/L — SIGNIFICANT CHANGE UP (ref 4–33)
ALT FLD-CCNC: 18 U/L — SIGNIFICANT CHANGE UP (ref 4–33)
ANION GAP SERPL CALC-SCNC: 19 MMOL/L — HIGH (ref 7–14)
ANION GAP SERPL CALC-SCNC: 21 MMOL/L — HIGH (ref 7–14)
AST SERPL-CCNC: 26 U/L — SIGNIFICANT CHANGE UP (ref 4–32)
AST SERPL-CCNC: 27 U/L — SIGNIFICANT CHANGE UP (ref 4–32)
BILIRUB SERPL-MCNC: 0.6 MG/DL — SIGNIFICANT CHANGE UP (ref 0.2–1.2)
BILIRUB SERPL-MCNC: 0.6 MG/DL — SIGNIFICANT CHANGE UP (ref 0.2–1.2)
BLOOD GAS VENOUS COMPREHENSIVE RESULT: SIGNIFICANT CHANGE UP
BUN SERPL-MCNC: 124 MG/DL — HIGH (ref 7–23)
BUN SERPL-MCNC: 133 MG/DL — HIGH (ref 7–23)
CALCIUM SERPL-MCNC: 7.5 MG/DL — LOW (ref 8.4–10.5)
CALCIUM SERPL-MCNC: 7.8 MG/DL — LOW (ref 8.4–10.5)
CHLORIDE SERPL-SCNC: 93 MMOL/L — LOW (ref 98–107)
CHLORIDE SERPL-SCNC: 95 MMOL/L — LOW (ref 98–107)
CO2 SERPL-SCNC: 25 MMOL/L — SIGNIFICANT CHANGE UP (ref 22–31)
CO2 SERPL-SCNC: 27 MMOL/L — SIGNIFICANT CHANGE UP (ref 22–31)
CREAT SERPL-MCNC: 5.99 MG/DL — HIGH (ref 0.5–1.3)
CREAT SERPL-MCNC: 6.32 MG/DL — HIGH (ref 0.5–1.3)
EGFR: 7 ML/MIN/1.73M2 — LOW
EGFR: 8 ML/MIN/1.73M2 — LOW
GLUCOSE SERPL-MCNC: 109 MG/DL — HIGH (ref 70–99)
GLUCOSE SERPL-MCNC: 99 MG/DL — SIGNIFICANT CHANGE UP (ref 70–99)
HCT VFR BLD CALC: 25.6 % — LOW (ref 34.5–45)
HCT VFR BLD CALC: 25.8 % — LOW (ref 34.5–45)
HGB BLD-MCNC: 8.8 G/DL — LOW (ref 11.5–15.5)
HGB BLD-MCNC: 8.9 G/DL — LOW (ref 11.5–15.5)
MAGNESIUM SERPL-MCNC: 2.9 MG/DL — HIGH (ref 1.6–2.6)
MAGNESIUM SERPL-MCNC: 2.9 MG/DL — HIGH (ref 1.6–2.6)
MCHC RBC-ENTMCNC: 25.8 PG — LOW (ref 27–34)
MCHC RBC-ENTMCNC: 26.2 PG — LOW (ref 27–34)
MCHC RBC-ENTMCNC: 34.1 GM/DL — SIGNIFICANT CHANGE UP (ref 32–36)
MCHC RBC-ENTMCNC: 34.8 GM/DL — SIGNIFICANT CHANGE UP (ref 32–36)
MCV RBC AUTO: 75.3 FL — LOW (ref 80–100)
MCV RBC AUTO: 75.7 FL — LOW (ref 80–100)
NRBC # BLD: 0 /100 WBCS — SIGNIFICANT CHANGE UP (ref 0–0)
NRBC # BLD: 0 /100 WBCS — SIGNIFICANT CHANGE UP (ref 0–0)
NRBC # FLD: 0 K/UL — SIGNIFICANT CHANGE UP (ref 0–0)
NRBC # FLD: 0 K/UL — SIGNIFICANT CHANGE UP (ref 0–0)
PHOSPHATE SERPL-MCNC: 8.5 MG/DL — HIGH (ref 2.5–4.5)
PHOSPHATE SERPL-MCNC: 9.2 MG/DL — HIGH (ref 2.5–4.5)
PLATELET # BLD AUTO: 66 K/UL — LOW (ref 150–400)
PLATELET # BLD AUTO: 76 K/UL — LOW (ref 150–400)
POTASSIUM SERPL-MCNC: 4.7 MMOL/L — SIGNIFICANT CHANGE UP (ref 3.5–5.3)
POTASSIUM SERPL-MCNC: 4.8 MMOL/L — SIGNIFICANT CHANGE UP (ref 3.5–5.3)
POTASSIUM SERPL-SCNC: 4.7 MMOL/L — SIGNIFICANT CHANGE UP (ref 3.5–5.3)
POTASSIUM SERPL-SCNC: 4.8 MMOL/L — SIGNIFICANT CHANGE UP (ref 3.5–5.3)
PROT SERPL-MCNC: 5.3 G/DL — LOW (ref 6–8.3)
PROT SERPL-MCNC: 5.8 G/DL — LOW (ref 6–8.3)
RBC # BLD: 3.4 M/UL — LOW (ref 3.8–5.2)
RBC # BLD: 3.41 M/UL — LOW (ref 3.8–5.2)
RBC # FLD: 15.3 % — HIGH (ref 10.3–14.5)
RBC # FLD: 15.8 % — HIGH (ref 10.3–14.5)
SODIUM SERPL-SCNC: 139 MMOL/L — SIGNIFICANT CHANGE UP (ref 135–145)
SODIUM SERPL-SCNC: 141 MMOL/L — SIGNIFICANT CHANGE UP (ref 135–145)
WBC # BLD: 10.07 K/UL — SIGNIFICANT CHANGE UP (ref 3.8–10.5)
WBC # BLD: 11.37 K/UL — HIGH (ref 3.8–10.5)
WBC # FLD AUTO: 10.07 K/UL — SIGNIFICANT CHANGE UP (ref 3.8–10.5)
WBC # FLD AUTO: 11.37 K/UL — HIGH (ref 3.8–10.5)

## 2024-09-07 PROCEDURE — 99233 SBSQ HOSP IP/OBS HIGH 50: CPT | Mod: GC

## 2024-09-07 PROCEDURE — 99291 CRITICAL CARE FIRST HOUR: CPT | Mod: GC

## 2024-09-07 PROCEDURE — 76770 US EXAM ABDO BACK WALL COMP: CPT | Mod: 26

## 2024-09-07 RX ORDER — HYDRALAZINE HYDROCHLORIDE 100 MG/1
10 TABLET ORAL ONCE
Refills: 0 | Status: COMPLETED | OUTPATIENT
Start: 2024-09-07 | End: 2024-09-07

## 2024-09-07 RX ORDER — ACETAMINOPHEN 325 MG
1000 TABLET ORAL ONCE
Refills: 0 | Status: COMPLETED | OUTPATIENT
Start: 2024-09-07 | End: 2024-09-07

## 2024-09-07 RX ORDER — SEVELAMER CARBONATE 800 MG/1
800 TABLET, FILM COATED ORAL
Refills: 0 | Status: DISCONTINUED | OUTPATIENT
Start: 2024-09-07 | End: 2024-09-08

## 2024-09-07 RX ADMIN — Medication 5000 UNIT(S): at 22:56

## 2024-09-07 RX ADMIN — CHLORHEXIDINE GLUCONATE ORAL RINSE 1 APPLICATION(S): 1.2 SOLUTION DENTAL at 05:29

## 2024-09-07 RX ADMIN — Medication 5000 UNIT(S): at 05:29

## 2024-09-07 RX ADMIN — HYDRALAZINE HYDROCHLORIDE 10 MILLIGRAM(S): 100 TABLET ORAL at 02:33

## 2024-09-07 RX ADMIN — Medication 1000 MILLIGRAM(S): at 17:31

## 2024-09-07 RX ADMIN — Medication 5000 UNIT(S): at 13:57

## 2024-09-07 RX ADMIN — ERTAPENEM 100 MILLIGRAM(S): 1 INJECTION, POWDER, LYOPHILIZED, FOR SOLUTION INTRAMUSCULAR; INTRAVENOUS at 16:00

## 2024-09-07 RX ADMIN — Medication 400 MILLIGRAM(S): at 02:35

## 2024-09-07 RX ADMIN — Medication 1000 MILLIGRAM(S): at 04:24

## 2024-09-07 RX ADMIN — Medication 400 MILLIGRAM(S): at 08:55

## 2024-09-07 RX ADMIN — Medication 1000 MILLIGRAM(S): at 09:44

## 2024-09-07 RX ADMIN — Medication 400 MILLIGRAM(S): at 17:01

## 2024-09-07 NOTE — PROGRESS NOTE ADULT - PROBLEM SELECTOR PLAN 1
Pt with LINA 2/2 sepsis due to ESBL E. Coli bacteremia and UTI. Encephalopathy likely due to sepsis +/- uremia (BUN>100). Labs at [facility] in July showed normal Cr per daughter. On admission, BUN/Cr 143/7.02. CT A/p with IV Contrast (9/6) showed cystitis with pyelonephritis. Initially acidotic and hyperkalemia, resolved with bicarb gtt and now off. Cr improved to 6.3, but BUN/Cr remains elevated. However, pt non-oliguric with UOP >1L/24h. BCx with ESBL E. coli, started on Ertapenem on 9/6. Will hold off on any IVF or diuretics for now. Will also hold off on HD and monitor labs and UOP closely. If mental status does not improve, may consider HD for uremia. Discussed with MICU team.      Diana Boyd, PGY-5  Nephrology Fellow  MS TEAMS preferred  (After 4pm or on weekends, please contact the fellow on call).

## 2024-09-07 NOTE — PROGRESS NOTE ADULT - SUBJECTIVE AND OBJECTIVE BOX
[facility] Division of Kidney Diseases & Hypertension  FOLLOW UP NOTE  [phone]--------------------------------------------------------------------------------    Reason for consult: LINA    24 hour events/subjective: Patient seen and examined today. Now on nasal cannula but still lethargic. Non-oliguric but still with elevated BUN/Cr      PAST HISTORY  --------------------------------------------------------------------------------  No significant changes to PMH, PSH, FHx, SHx, unless otherwise noted    ALLERGIES & MEDICATIONS  --------------------------------------------------------------------------------  Allergies    No Known Allergies      Standing Inpatient Medications  chlorhexidine 2% Cloths 1 Application(s) Topical <User Schedule>  ertapenem  IVPB 500 milliGRAM(s) IV Intermittent every 24 hours  heparin   Injectable 5000 Unit(s) SubCutaneous every 8 hours  sevelamer carbonate 800 milliGRAM(s) Oral three times a day with meals    PRN Inpatient Medications      REVIEW OF SYSTEMS  --------------------------------------------------------------------------------  Unable to obtain ROS due to patient's clinical condition.     VITALS/PHYSICAL EXAM  --------------------------------------------------------------------------------  T(C): 37.8 (09-07-24 @ 12:00), Max: 39.4 (09-07-24 @ 02:27)  HR: 75 (09-07-24 @ 12:00) (74 - 98)  BP: 147/48 (09-07-24 @ 12:00) (118/47 - 190/64)  RR: 21 (09-07-24 @ 12:00) (18 - 28)  SpO2: 99% (09-07-24 @ 12:00) (90% - 100%)  Wt(kg): --  Height (cm): 157.5 (09-06-24 @ 02:40)  Weight (kg): 63.1 (09-06-24 @ 02:40)  BMI (kg/m2): 25.4 (09-06-24 @ 02:40)  BSA (m2): 1.64 (09-06-24 @ 02:40)      09-06-24 @ 07:01  -  09-07-24 @ 07:00  --------------------------------------------------------  IN: 3763 mL / OUT: 1055 mL / NET: 2708 mL    09-07-24 @ 07:01  -  09-07-24 @ 13:07  --------------------------------------------------------  IN: 100 mL / OUT: 90 mL / NET: 10 mL      Physical Exam:                       Gen: NAD                       Pulm: CTA B/L                       CV: +S1S2                       Abd: +BS, soft, nondistended/nontender                       : jose                       Extremities: no LE edema                       Neuro: lethargic but awake    LABS/STUDIES  --------------------------------------------------------------------------------              8.9    10.07 >-----------<  66       [09-07-24 @ 09:54]              25.6     141  |  95  |  124  ----------------------------<  99      [09-07-24 @ 09:54]  4.7   |  25  |  6.32        Ca     7.8     [09-07-24 @ 09:54]      Mg     2.90     [09-07-24 @ 09:54]      Phos  9.2     [09-07-24 @ 09:54]    TPro  5.3  /  Alb  2.5  /  TBili  0.6  /  DBili  x   /  AST  27  /  ALT  18  /  AlkPhos  99  [09-07-24 @ 09:54]    PT/INR: PT 13.4 , INR 1.20       [09-05-24 @ 20:45]  PTT: 23.2       [09-05-24 @ 20:45]      Creatinine Trend:  SCr 6.32 [09-07 @ 09:54]  SCr 5.99 [09-07 @ 02:20]  SCr 5.90 [09-06 @ 20:26]  SCr 6.52 [09-06 @ 06:00]  SCr 6.42 [09-06 @ 02:20]

## 2024-09-07 NOTE — PROGRESS NOTE ADULT - ATTENDING COMMENTS
59yo woman, Georgian speaking, h/o rheumatoid arthritis on MTX, HTN, HLD, prediabetic, and GERD who presents for generalized weakness, AMS, fever/chill for 3-4 days. Recent UTI treated with Cipro outpatient 10 days ago. CT c/f cystitis with ascending ureteritis and pyelonephritis. Pt found to have hyperkalemia, acidosis, and symptoms c/f acute renal failure. Admitted to MICU for Hypotension from severe sepsis with shock due to pyelonephritis and gram-negative bacteremia on ertapenem, f/u cultures, requiring vasopressor support, and Acute hypoxemic respiratory failure in setting of severe metabolic acidosis, sepsis. LINA from ATN, making urine, A lines on POCUS; renal f/u. pt following commands with Georgian translation but still with some encephalopathy, secondary to sepsis, uremia (BUN coming down). Continue to monitor off Bilevel, repeat blood gas, monitor urine output.

## 2024-09-07 NOTE — PROGRESS NOTE ADULT - ASSESSMENT
59yo woman, Serbian speaking, h/o rheumatoid arthritis on MTX, HTN, HLD, prediabetic, and GERD who presents for generalized weakness, AMS, fever/chill for 3-4 days. Recent UTI treated with Cipro outpatient 10 days ago. CT c/f cystitis with ascending ureteritis and pyelonephritis. Pt found to have hyperkalemia, acidosis, and symptoms c/f acute renal failure. Admitted to MICU possible urgent HD.      ===================NEURO===================  AAOx0 - unknown baseline  #Lethargic, altered mental status, improving   Likely 2/2 to complicated UTI vs. uremic encephalopathy vs. acidosis  >s/p bicarb gtt  >BCx (+): E.coli ESBL CTX resistant  >d/c zosyn  >start ertapenem 500mg qd (renal dose)    ==================CARDIAC===================  #HTN  #HLD  on home losartan, atorvastatin  >holding home meds    ===================PULM====================  #Acidotic with respiratory compensation  increased WOB in the ED  >s/p BiPAP  >on 2L NC  ===================RENAL====================  #acute renal failure  #metabolic acidosis  #hyperkalemia  Scr 7.02, repeat 6.9  K 5.9  Likely 2/2 to complicated UTI vs. hypovolemic (decreased po intake, diarrhea iso misused miralax)    >s/p bicarb gtt  >monitor electrolytes  >if symptoms not improved, consider HD  >Nephro following    =====================GI======================  #Diet  NPO while on BiPAP    #GERD  on home dexlansoprazole   >hold home meds    ===================HEME/ONC=================  #RA  on home MTX 2.5 mg 6 tablets a week  last took on 9/2  #DVT ppx  Hep subQ q8h    ======================ID========================  #recent UTI treated with Cipro (incomplete, self-discontinued 3-4 days ago)  #Cystitis with ascending ureteritis and pyelonephritis on CT  >BCx (+): E.coli ESBL CTX resistant, UCx (+) E.coli  >d/c zosyn  >start ertapenem 500mg qd (renal dose)      =====================ENDO=================  #prediabetes (last A1c of 6.2)    =====================ETHICS=======================  Full Code

## 2024-09-07 NOTE — PROGRESS NOTE ADULT - SUBJECTIVE AND OBJECTIVE BOX
Chilo Briceño MD PGY-1  ---------------------------------------------------------------------------------------------  Patient is a 58y old  Female who presents with a chief complaint of urgent HD (06 Sep 2024 08:26)      HPI:  Ms. Aggie Call is a 57yo woman, Swedish speaking, h/o rheumatoid arthritis on MTX, HTN, HLD, and GERD who presents for generalized weakness, AMS, fever/chill for 3-4 days. Per son, patient was given Cipro by PCP for symptoms c/f UTI about 10 days ago, after which she has been becoming more confused, lethargic, throat pain with worsening PO intake and self-discontinued medications for 3 days.    In the ED, pt was found to be febrile, tachypneic, /107, and increased WOB. Labs notable for K 6.9 (hemolyzed), repeat 5.9 with no significant change on EKG, Scr 6.86, VBG 7.23/30/35/12, repeat VBG 7.16/43/33/13. CT showed Cystitis with ascending ureteritis and pyelonephritis. Pt was given Lokelma, Ca-gluconate, and insulin/detrose for hyperkalemia, LR and Zosyn in the setting of UTI, and placed on bicarb ggt for acidosis.     Pt was admitted to MICU for possible urgent HD and continued monitoring given worsening acidosis on repeat vbg.   (06 Sep 2024 01:47)      SUBJECTIVE / OVERNIGHT EVENTS:  Overnight: Pt febrile tmax 103. s/p tylenol. SCr improving 5.9. Hypertensive s/p 1x hydral 10 IVP. Off BiPAP, off bicarb gtt  Subjective: Pt breathing comfortable on 2L NC. Some tremors. Not following commands appropriately, increased speech latency. AAOx1. Urine output ~30cc/hr. no BM.       MEDICATIONS  (STANDING):  chlorhexidine 2% Cloths 1 Application(s) Topical <User Schedule>  ertapenem  IVPB 500 milliGRAM(s) IV Intermittent every 24 hours  heparin   Injectable 5000 Unit(s) SubCutaneous every 8 hours    MEDICATIONS  (PRN):    Allergies    No Known Allergies    Intolerances        ICU Vital Signs Last 24 Hrs  T(F): 99.4 (07 Sep 2024 04:33), Max: 103 (07 Sep 2024 02:27)  HR: 80 (07 Sep 2024 07:49) (63 - 94)  BP: 150/53 (07 Sep 2024 06:00) (91/42 - 187/49)  BP(mean): 75 (07 Sep 2024 06:00) (53 - 107)  ABP: --  ABP(mean): --  RR: 24 (07 Sep 2024 06:00) (17 - 28)  SpO2: 98% (07 Sep 2024 07:49) (98% - 100%)      Physical Exam:   GENERAL: ill appearing, tremulous  CHEST/LUNG: CTAB; No rales, rhonchi, wheezing, or rubs. Unlabored respirations  HEART: Regular rate and rhythm; Normal S1/S2. No murmurs, rubs, or gallops  ABDOMEN: Bowel sounds present; Soft, Nontender, Nondistended. No hepatomegaly  EXTREMITIES:  2+ Peripheral Pulses, brisk capillary refill. No clubbing, cyanosis, or edema  NERVOUS SYSTEM:  AAOx1, not responding to command, increased speech latency    I&O's Summary    06 Sep 2024 07:01  -  07 Sep 2024 07:00  --------------------------------------------------------  IN: 3763 mL / OUT: 1055 mL / NET: 2708 mL        LABS:                        8.8    11.37 )-----------( 76       ( 07 Sep 2024 02:20 )             25.8     09-07    139  |  93<L>  |  133<H>  ----------------------------<  109<H>  4.8   |  27  |  5.99<H>    Ca    7.5<L>      07 Sep 2024 02:20  Phos  8.5     09-07  Mg     2.90     09-07    TPro  5.8<L>  /  Alb  2.6<L>  /  TBili  0.6  /  DBili  x   /  AST  26  /  ALT  16  /  AlkPhos  104  09-07    PT/INR - ( 05 Sep 2024 20:45 )   PT: 13.4 sec;   INR: 1.20 ratio         PTT - ( 05 Sep 2024 20:45 )  PTT:23.2 sec  ABG - ( 06 Sep 2024 06:31 )  pH, Arterial: 7.45  pH, Blood: x     /  pCO2: 22    /  pO2: 189   / HCO3: 15    / Base Excess: -7.8  /  SaO2: 98.0                Venous: 09-06-24 @ 20:26 FiO2: -- Oxygen Sat% 91.1    Urinalysis Basic - ( 07 Sep 2024 02:20 )    Color: x / Appearance: x / SG: x / pH: x  Gluc: 109 mg/dL / Ketone: x  / Bili: x / Urobili: x   Blood: x / Protein: x / Nitrite: x   Leuk Esterase: x / RBC: x / WBC x   Sq Epi: x / Non Sq Epi: x / Bacteria: x          CAPILLARY BLOOD GLUCOSE          RADIOLOGY & ADDITIONAL TESTS:  Results Reviewed:   Imaging Personally Reviewed:  Electrocardiogram Personally Reviewed:

## 2024-09-08 LAB
-  AMPICILLIN/SULBACTAM: SIGNIFICANT CHANGE UP
-  AMPICILLIN: SIGNIFICANT CHANGE UP
-  AZTREONAM: SIGNIFICANT CHANGE UP
-  CEFAZOLIN: SIGNIFICANT CHANGE UP
-  CEFEPIME: SIGNIFICANT CHANGE UP
-  CEFTRIAXONE: SIGNIFICANT CHANGE UP
-  CIPROFLOXACIN: SIGNIFICANT CHANGE UP
-  ERTAPENEM: SIGNIFICANT CHANGE UP
-  GENTAMICIN: SIGNIFICANT CHANGE UP
-  IMIPENEM: SIGNIFICANT CHANGE UP
-  LEVOFLOXACIN: SIGNIFICANT CHANGE UP
-  MEROPENEM: SIGNIFICANT CHANGE UP
-  PIPERACILLIN/TAZOBACTAM: SIGNIFICANT CHANGE UP
-  TOBRAMYCIN: SIGNIFICANT CHANGE UP
-  TRIMETHOPRIM/SULFAMETHOXAZOLE: SIGNIFICANT CHANGE UP
ALBUMIN SERPL ELPH-MCNC: 2.1 G/DL — LOW (ref 3.3–5)
ALBUMIN SERPL ELPH-MCNC: 2.4 G/DL — LOW (ref 3.3–5)
ALP SERPL-CCNC: 101 U/L — SIGNIFICANT CHANGE UP (ref 40–120)
ALP SERPL-CCNC: 98 U/L — SIGNIFICANT CHANGE UP (ref 40–120)
ALT FLD-CCNC: 13 U/L — SIGNIFICANT CHANGE UP (ref 4–33)
ALT FLD-CCNC: 13 U/L — SIGNIFICANT CHANGE UP (ref 4–33)
ANION GAP SERPL CALC-SCNC: 25 MMOL/L — HIGH (ref 7–14)
ANION GAP SERPL CALC-SCNC: 25 MMOL/L — HIGH (ref 7–14)
AST SERPL-CCNC: 23 U/L — SIGNIFICANT CHANGE UP (ref 4–32)
AST SERPL-CCNC: 25 U/L — SIGNIFICANT CHANGE UP (ref 4–32)
BILIRUB SERPL-MCNC: 0.4 MG/DL — SIGNIFICANT CHANGE UP (ref 0.2–1.2)
BILIRUB SERPL-MCNC: 0.5 MG/DL — SIGNIFICANT CHANGE UP (ref 0.2–1.2)
BLOOD GAS VENOUS COMPREHENSIVE RESULT: SIGNIFICANT CHANGE UP
BUN SERPL-MCNC: 143 MG/DL — HIGH (ref 7–23)
BUN SERPL-MCNC: 147 MG/DL — HIGH (ref 7–23)
CALCIUM SERPL-MCNC: 8.1 MG/DL — LOW (ref 8.4–10.5)
CALCIUM SERPL-MCNC: 8.2 MG/DL — LOW (ref 8.4–10.5)
CHLORIDE SERPL-SCNC: 100 MMOL/L — SIGNIFICANT CHANGE UP (ref 98–107)
CHLORIDE SERPL-SCNC: 98 MMOL/L — SIGNIFICANT CHANGE UP (ref 98–107)
CO2 SERPL-SCNC: 18 MMOL/L — LOW (ref 22–31)
CO2 SERPL-SCNC: 20 MMOL/L — LOW (ref 22–31)
CREAT SERPL-MCNC: 6.55 MG/DL — HIGH (ref 0.5–1.3)
CREAT SERPL-MCNC: 6.61 MG/DL — HIGH (ref 0.5–1.3)
CULTURE RESULTS: ABNORMAL
CULTURE RESULTS: ABNORMAL
DIALYSIS INSTRUMENT RESULT - HEPATITIS B SURFACE ANTIGEN: NEGATIVE — SIGNIFICANT CHANGE UP
EGFR: 7 ML/MIN/1.73M2 — LOW
EGFR: 7 ML/MIN/1.73M2 — LOW
GLUCOSE BLDC GLUCOMTR-MCNC: 144 MG/DL — HIGH (ref 70–99)
GLUCOSE SERPL-MCNC: 114 MG/DL — HIGH (ref 70–99)
GLUCOSE SERPL-MCNC: 133 MG/DL — HIGH (ref 70–99)
HCT VFR BLD CALC: 25.4 % — LOW (ref 34.5–45)
HCT VFR BLD CALC: 27.2 % — LOW (ref 34.5–45)
HGB BLD-MCNC: 8.6 G/DL — LOW (ref 11.5–15.5)
HGB BLD-MCNC: 8.7 G/DL — LOW (ref 11.5–15.5)
INR BLD: 1.15 RATIO — SIGNIFICANT CHANGE UP (ref 0.85–1.18)
MAGNESIUM SERPL-MCNC: 3.1 MG/DL — HIGH (ref 1.6–2.6)
MAGNESIUM SERPL-MCNC: 3.2 MG/DL — HIGH (ref 1.6–2.6)
MCHC RBC-ENTMCNC: 25.7 PG — LOW (ref 27–34)
MCHC RBC-ENTMCNC: 25.9 PG — LOW (ref 27–34)
MCHC RBC-ENTMCNC: 31.6 GM/DL — LOW (ref 32–36)
MCHC RBC-ENTMCNC: 34.3 GM/DL — SIGNIFICANT CHANGE UP (ref 32–36)
MCV RBC AUTO: 75.6 FL — LOW (ref 80–100)
MCV RBC AUTO: 81.2 FL — SIGNIFICANT CHANGE UP (ref 80–100)
METHOD TYPE: SIGNIFICANT CHANGE UP
NRBC # BLD: 0 /100 WBCS — SIGNIFICANT CHANGE UP (ref 0–0)
NRBC # BLD: 0 /100 WBCS — SIGNIFICANT CHANGE UP (ref 0–0)
NRBC # FLD: 0 K/UL — SIGNIFICANT CHANGE UP (ref 0–0)
NRBC # FLD: 0 K/UL — SIGNIFICANT CHANGE UP (ref 0–0)
ORGANISM # SPEC MICROSCOPIC CNT: ABNORMAL
PHOSPHATE SERPL-MCNC: 11.5 MG/DL — HIGH (ref 2.5–4.5)
PHOSPHATE SERPL-MCNC: 11.7 MG/DL — HIGH (ref 2.5–4.5)
PLATELET # BLD AUTO: 54 K/UL — LOW (ref 150–400)
PLATELET # BLD AUTO: 62 K/UL — LOW (ref 150–400)
POTASSIUM SERPL-MCNC: 4.7 MMOL/L — SIGNIFICANT CHANGE UP (ref 3.5–5.3)
POTASSIUM SERPL-MCNC: 4.9 MMOL/L — SIGNIFICANT CHANGE UP (ref 3.5–5.3)
POTASSIUM SERPL-SCNC: 4.7 MMOL/L — SIGNIFICANT CHANGE UP (ref 3.5–5.3)
POTASSIUM SERPL-SCNC: 4.9 MMOL/L — SIGNIFICANT CHANGE UP (ref 3.5–5.3)
PROT SERPL-MCNC: 5.6 G/DL — LOW (ref 6–8.3)
PROT SERPL-MCNC: 5.7 G/DL — LOW (ref 6–8.3)
PROTHROM AB SERPL-ACNC: 12.9 SEC — SIGNIFICANT CHANGE UP (ref 9.5–13)
RBC # BLD: 3.35 M/UL — LOW (ref 3.8–5.2)
RBC # BLD: 3.36 M/UL — LOW (ref 3.8–5.2)
RBC # FLD: 15.9 % — HIGH (ref 10.3–14.5)
RBC # FLD: 17 % — HIGH (ref 10.3–14.5)
SODIUM SERPL-SCNC: 143 MMOL/L — SIGNIFICANT CHANGE UP (ref 135–145)
SODIUM SERPL-SCNC: 143 MMOL/L — SIGNIFICANT CHANGE UP (ref 135–145)
SPECIMEN SOURCE: SIGNIFICANT CHANGE UP
SPECIMEN SOURCE: SIGNIFICANT CHANGE UP
WBC # BLD: 7.11 K/UL — SIGNIFICANT CHANGE UP (ref 3.8–10.5)
WBC # BLD: 9.65 K/UL — SIGNIFICANT CHANGE UP (ref 3.8–10.5)
WBC # FLD AUTO: 7.11 K/UL — SIGNIFICANT CHANGE UP (ref 3.8–10.5)
WBC # FLD AUTO: 9.65 K/UL — SIGNIFICANT CHANGE UP (ref 3.8–10.5)

## 2024-09-08 PROCEDURE — 36556 INSERT NON-TUNNEL CV CATH: CPT | Mod: GC

## 2024-09-08 PROCEDURE — 99291 CRITICAL CARE FIRST HOUR: CPT | Mod: GC,25

## 2024-09-08 PROCEDURE — 99233 SBSQ HOSP IP/OBS HIGH 50: CPT | Mod: GC

## 2024-09-08 PROCEDURE — 71045 X-RAY EXAM CHEST 1 VIEW: CPT | Mod: 26

## 2024-09-08 RX ORDER — DESMOPRESSIN ACETATE 4 UG/ML
19 INJECTION, SOLUTION INTRAVENOUS; SUBCUTANEOUS ONCE
Refills: 0 | Status: DISCONTINUED | OUTPATIENT
Start: 2024-09-08 | End: 2024-09-08

## 2024-09-08 RX ORDER — HYDRALAZINE HYDROCHLORIDE 100 MG/1
10 TABLET ORAL ONCE
Refills: 0 | Status: COMPLETED | OUTPATIENT
Start: 2024-09-08 | End: 2024-09-08

## 2024-09-08 RX ORDER — DEXMEDETOMIDINE HYDROCHLORIDE IN 0.9% SODIUM CHLORIDE 400 UG/100ML
0.5 INJECTION INTRAVENOUS
Qty: 400 | Refills: 0 | Status: DISCONTINUED | OUTPATIENT
Start: 2024-09-08 | End: 2024-09-09

## 2024-09-08 RX ORDER — MIDAZOLAM HCL 1 MG/ML
2 VIAL (ML) INJECTION ONCE
Refills: 0 | Status: DISCONTINUED | OUTPATIENT
Start: 2024-09-08 | End: 2024-09-08

## 2024-09-08 RX ORDER — ACETAMINOPHEN 325 MG
1000 TABLET ORAL ONCE
Refills: 0 | Status: COMPLETED | OUTPATIENT
Start: 2024-09-08 | End: 2024-09-08

## 2024-09-08 RX ORDER — DESMOPRESSIN ACETATE 4 UG/ML
19 INJECTION, SOLUTION INTRAVENOUS; SUBCUTANEOUS ONCE
Refills: 0 | Status: COMPLETED | OUTPATIENT
Start: 2024-09-08 | End: 2024-09-08

## 2024-09-08 RX ORDER — CHLORHEXIDINE GLUCONATE ORAL RINSE 1.2 MG/ML
1 SOLUTION DENTAL
Refills: 0 | Status: DISCONTINUED | OUTPATIENT
Start: 2024-09-08 | End: 2024-09-09

## 2024-09-08 RX ORDER — SEVELAMER CARBONATE 800 MG/1
1600 TABLET, FILM COATED ORAL
Refills: 0 | Status: DISCONTINUED | OUTPATIENT
Start: 2024-09-08 | End: 2024-09-12

## 2024-09-08 RX ORDER — SODIUM CHLORIDE 0.9 % (FLUSH) 0.9 %
10 SYRINGE (ML) INJECTION
Refills: 0 | Status: DISCONTINUED | OUTPATIENT
Start: 2024-09-08 | End: 2024-09-22

## 2024-09-08 RX ORDER — LABETALOL HYDROCHLORIDE 200 MG/1
10 TABLET, FILM COATED ORAL ONCE
Refills: 0 | Status: COMPLETED | OUTPATIENT
Start: 2024-09-08 | End: 2024-09-08

## 2024-09-08 RX ORDER — SODIUM CHLORIDE IRRIG SOLUTION 0.9 %
1000 SOLUTION, IRRIGATION IRRIGATION ONCE
Refills: 0 | Status: COMPLETED | OUTPATIENT
Start: 2024-09-08 | End: 2024-09-08

## 2024-09-08 RX ADMIN — Medication 400 MILLIGRAM(S): at 10:10

## 2024-09-08 RX ADMIN — Medication 2 MILLIGRAM(S): at 16:57

## 2024-09-08 RX ADMIN — CHLORHEXIDINE GLUCONATE ORAL RINSE 1 APPLICATION(S): 1.2 SOLUTION DENTAL at 18:12

## 2024-09-08 RX ADMIN — DEXMEDETOMIDINE HYDROCHLORIDE IN 0.9% SODIUM CHLORIDE 7.89 MICROGRAM(S)/KG/HR: 400 INJECTION INTRAVENOUS at 18:56

## 2024-09-08 RX ADMIN — Medication 400 MILLIGRAM(S): at 02:45

## 2024-09-08 RX ADMIN — Medication 5000 UNIT(S): at 05:55

## 2024-09-08 RX ADMIN — Medication 500 MILLILITER(S): at 10:10

## 2024-09-08 RX ADMIN — LABETALOL HYDROCHLORIDE 10 MILLIGRAM(S): 200 TABLET, FILM COATED ORAL at 00:40

## 2024-09-08 RX ADMIN — SEVELAMER CARBONATE 1600 MILLIGRAM(S): 800 TABLET, FILM COATED ORAL at 17:41

## 2024-09-08 RX ADMIN — HYDRALAZINE HYDROCHLORIDE 10 MILLIGRAM(S): 100 TABLET ORAL at 02:45

## 2024-09-08 RX ADMIN — CHLORHEXIDINE GLUCONATE ORAL RINSE 1 APPLICATION(S): 1.2 SOLUTION DENTAL at 05:55

## 2024-09-08 RX ADMIN — DESMOPRESSIN ACETATE 219 MICROGRAM(S): 4 INJECTION, SOLUTION INTRAVENOUS; SUBCUTANEOUS at 14:14

## 2024-09-08 RX ADMIN — Medication 5000 UNIT(S): at 23:16

## 2024-09-08 RX ADMIN — ERTAPENEM 100 MILLIGRAM(S): 1 INJECTION, POWDER, LYOPHILIZED, FOR SOLUTION INTRAMUSCULAR; INTRAVENOUS at 17:22

## 2024-09-08 RX ADMIN — Medication 5000 UNIT(S): at 13:31

## 2024-09-08 RX ADMIN — Medication 1000 MILLIGRAM(S): at 10:40

## 2024-09-08 NOTE — PROGRESS NOTE ADULT - SUBJECTIVE AND OBJECTIVE BOX
INTERVAL HPI/OVERNIGHT EVENTS:    SUBJECTIVE: Patient seen and examined at bedside.     ROS: All negative except as listed above.    VITAL SIGNS:  ICU Vital Signs Last 24 Hrs  T(C): 37 (08 Sep 2024 04:00), Max: 38.6 (08 Sep 2024 02:00)  T(F): 98.6 (08 Sep 2024 04:00), Max: 101.5 (08 Sep 2024 02:00)  HR: 80 (08 Sep 2024 06:00) (74 - 106)  BP: 126/48 (08 Sep 2024 06:00) (114/40 - 199/54)  BP(mean): 63 (08 Sep 2024 06:00) (56 - 115)  ABP: --  ABP(mean): --  RR: 23 (08 Sep 2024 06:00) (18 - 29)  SpO2: 99% (08 Sep 2024 06:00) (90% - 100%)    O2 Parameters below as of 08 Sep 2024 06:00  Patient On (Oxygen Delivery Method): nasal cannula  O2 Flow (L/min): 2          Plateau pressure:   P/F ratio:     09-07 @ 07:01  -  09-08 @ 07:00  --------------------------------------------------------  IN: 250 mL / OUT: 1065 mL / NET: -815 mL      CAPILLARY BLOOD GLUCOSE          ECG: reviewed.    PHYSICAL EXAM:    GENERAL: NAD, lying in bed comfortably  HEAD:  Atraumatic, normocephalic  EYES: EOMI, PERRLA, conjunctiva and sclera clear  NECK: Supple, trachea midline, no JVD  HEART: Regular rate and rhythm, no murmurs, rubs, or gallops  LUNGS: Unlabored respirations.  Clear to auscultation bilaterally, no crackles, wheezing, or rhonchi  ABDOMEN: Soft, nontender, nondistended, +BS  EXTREMITIES: 2+ peripheral pulses bilaterally, cap refill<2 secs. No clubbing, cyanosis, or edema  NERVOUS SYSTEM:  A&Ox3, following commands, moving all extremities, no focal deficits   SKIN: No rashes or lesions    MEDICATIONS:  MEDICATIONS  (STANDING):  chlorhexidine 2% Cloths 1 Application(s) Topical <User Schedule>  ertapenem  IVPB 500 milliGRAM(s) IV Intermittent every 24 hours  heparin   Injectable 5000 Unit(s) SubCutaneous every 8 hours  sevelamer carbonate 800 milliGRAM(s) Oral three times a day with meals    MEDICATIONS  (PRN):      ALLERGIES:  Allergies    No Known Allergies    Intolerances        LABS:                        8.7    9.65  )-----------( 62       ( 08 Sep 2024 03:10 )             25.4     09-08    143  |  98  |  143<H>  ----------------------------<  114<H>  4.7   |  20<L>  |  6.61<H>    Ca    8.1<L>      08 Sep 2024 03:10  Phos  11.5     09-08  Mg     3.10     09-08    TPro  5.7<L>  /  Alb  2.4<L>  /  TBili  0.4  /  DBili  x   /  AST  25  /  ALT  13  /  AlkPhos  101  09-08    PT/INR - ( 08 Sep 2024 03:10 )   PT: 12.9 sec;   INR: 1.15 ratio           Urinalysis Basic - ( 08 Sep 2024 03:10 )    Color: x / Appearance: x / SG: x / pH: x  Gluc: 114 mg/dL / Ketone: x  / Bili: x / Urobili: x   Blood: x / Protein: x / Nitrite: x   Leuk Esterase: x / RBC: x / WBC x   Sq Epi: x / Non Sq Epi: x / Bacteria: x      ABG:      vBG:  pH, Venous: 7.41 (09-08-24 @ 03:10)  pCO2, Venous: 35 mmHg (09-08-24 @ 03:10)  pO2, Venous: 78 mmHg (09-08-24 @ 03:10)  HCO3, Venous: 22 mmol/L (09-08-24 @ 03:10)  pH, Venous: 7.40 (09-07-24 @ 14:45)  pCO2, Venous: 48 mmHg (09-07-24 @ 14:45)  pO2, Venous: 29 mmHg (09-07-24 @ 14:45)  HCO3, Venous: 30 mmol/L (09-07-24 @ 14:45)    Micro:    Culture - Blood (collected 09-05-24 @ 22:07)  Source: .Blood Blood-Peripheral  Gram Stain (09-06-24 @ 11:19):    Growth in anaerobic bottle: Gram Negative Rods    Growth in aerobic bottle: Gram Negative Rods  Final Report (09-08-24 @ 06:54):    Growth in aerobic and anaerobic bottles: Escherichia coli ESBL    See previous culture [id number]    Culture - Blood (collected 09-05-24 @ 20:46)  Source: .Blood Blood-Peripheral  Gram Stain (09-06-24 @ 10:57):    Growth in aerobic bottle: Gram Negative Rods    Growth in anaerobic bottle: Gram Negative Rods  Final Report (09-08-24 @ 06:54):    Growth in aerobic and anaerobic bottles: Escherichia coli ESBL    Direct identification is available within approximately 3-5    hours either by Blood Panel Multiplexed PCR or Direct    MALDI-TOF. Details: [email or web address]  Organism: Blood Culture PCR  Escherichia coli ESBL (09-08-24 @ 06:54)  Organism: Escherichia coli ESBL (09-08-24 @ 06:54)      Method Type: NEFTALI      -  Ampicillin: R >16 These ampicillin results predict results for amoxicillin      -  Ampicillin/Sulbactam: R 16/8      -  Aztreonam: R >16      -  Cefazolin: R >16      -  Cefepime: R >16      -  Ceftriaxone: R >32      -  Ciprofloxacin: R >2      -  Ertapenem: S <=0.5      -  Gentamicin: S <=2      -  Imipenem: S <=1      -  Levofloxacin: R >4      -  Meropenem: S <=1      -  Piperacillin/Tazobactam: R <=8      -  Tobramycin: R >8      -  Trimethoprim/Sulfamethoxazole: S <=0.5/9.5  Organism: Blood Culture PCR (09-08-24 @ 06:54)      Method Type: PCR      -  Escherichia coli: Detec      -  ESBL: Detec      -  CTX-M Resistance Marker: Detec       Urinalysis with Rflx Culture (collected 09-05-24 @ 22:30)         RADIOLOGY & ADDITIONAL TESTS: Reviewed.   INTERVAL HPI/OVERNIGHT EVENTS: Hypertensive overnight and given labetalol and hydralazine.     SUBJECTIVE: Patient seen and examined at bedside.     ROS: All negative except as listed above.    VITAL SIGNS:  ICU Vital Signs Last 24 Hrs  T(C): 37 (08 Sep 2024 04:00), Max: 38.6 (08 Sep 2024 02:00)  T(F): 98.6 (08 Sep 2024 04:00), Max: 101.5 (08 Sep 2024 02:00)  HR: 80 (08 Sep 2024 06:00) (74 - 106)  BP: 126/48 (08 Sep 2024 06:00) (114/40 - 199/54)  BP(mean): 63 (08 Sep 2024 06:00) (56 - 115)  ABP: --  ABP(mean): --  RR: 23 (08 Sep 2024 06:00) (18 - 29)  SpO2: 99% (08 Sep 2024 06:00) (90% - 100%)    O2 Parameters below as of 08 Sep 2024 06:00  Patient On (Oxygen Delivery Method): nasal cannula  O2 Flow (L/min): 2          Plateau pressure:   P/F ratio:     09-07 @ 07:01  -  09-08 @ 07:00  --------------------------------------------------------  IN: 250 mL / OUT: 1065 mL / NET: -815 mL      CAPILLARY BLOOD GLUCOSE          ECG: reviewed.    PHYSICAL EXAM:    GENERAL: NAD, lying in bed comfortably  HEAD:  Atraumatic, normocephalic  EYES: EOMI, PERRLA, conjunctiva and sclera clear  NECK: Supple, trachea midline, no JVD  HEART: Regular rate and rhythm, no murmurs, rubs, or gallops  LUNGS: Unlabored respirations.  Clear to auscultation bilaterally, no crackles, wheezing, or rhonchi  ABDOMEN: Soft, nontender, nondistended, +BS  EXTREMITIES: 2+ peripheral pulses bilaterally, cap refill<2 secs. No clubbing, cyanosis, or edema  NERVOUS SYSTEM:  A&Ox3, following commands, moving all extremities, no focal deficits   SKIN: No rashes or lesions    MEDICATIONS:  MEDICATIONS  (STANDING):  chlorhexidine 2% Cloths 1 Application(s) Topical <User Schedule>  ertapenem  IVPB 500 milliGRAM(s) IV Intermittent every 24 hours  heparin   Injectable 5000 Unit(s) SubCutaneous every 8 hours  sevelamer carbonate 800 milliGRAM(s) Oral three times a day with meals    MEDICATIONS  (PRN):      ALLERGIES:  Allergies    No Known Allergies    Intolerances        LABS:                        8.7    9.65  )-----------( 62       ( 08 Sep 2024 03:10 )             25.4     09-08    143  |  98  |  143<H>  ----------------------------<  114<H>  4.7   |  20<L>  |  6.61<H>    Ca    8.1<L>      08 Sep 2024 03:10  Phos  11.5     09-08  Mg     3.10     09-08    TPro  5.7<L>  /  Alb  2.4<L>  /  TBili  0.4  /  DBili  x   /  AST  25  /  ALT  13  /  AlkPhos  101  09-08    PT/INR - ( 08 Sep 2024 03:10 )   PT: 12.9 sec;   INR: 1.15 ratio           Urinalysis Basic - ( 08 Sep 2024 03:10 )    Color: x / Appearance: x / SG: x / pH: x  Gluc: 114 mg/dL / Ketone: x  / Bili: x / Urobili: x   Blood: x / Protein: x / Nitrite: x   Leuk Esterase: x / RBC: x / WBC x   Sq Epi: x / Non Sq Epi: x / Bacteria: x      ABG:      vBG:  pH, Venous: 7.41 (09-08-24 @ 03:10)  pCO2, Venous: 35 mmHg (09-08-24 @ 03:10)  pO2, Venous: 78 mmHg (09-08-24 @ 03:10)  HCO3, Venous: 22 mmol/L (09-08-24 @ 03:10)  pH, Venous: 7.40 (09-07-24 @ 14:45)  pCO2, Venous: 48 mmHg (09-07-24 @ 14:45)  pO2, Venous: 29 mmHg (09-07-24 @ 14:45)  HCO3, Venous: 30 mmol/L (09-07-24 @ 14:45)    Micro:    Culture - Blood (collected 09-05-24 @ 22:07)  Source: .Blood Blood-Peripheral  Gram Stain (09-06-24 @ 11:19):    Growth in anaerobic bottle: Gram Negative Rods    Growth in aerobic bottle: Gram Negative Rods  Final Report (09-08-24 @ 06:54):    Growth in aerobic and anaerobic bottles: Escherichia coli ESBL    See previous culture [id number]    Culture - Blood (collected 09-05-24 @ 20:46)  Source: .Blood Blood-Peripheral  Gram Stain (09-06-24 @ 10:57):    Growth in aerobic bottle: Gram Negative Rods    Growth in anaerobic bottle: Gram Negative Rods  Final Report (09-08-24 @ 06:54):    Growth in aerobic and anaerobic bottles: Escherichia coli ESBL    Direct identification is available within approximately 3-5    hours either by Blood Panel Multiplexed PCR or Direct    MALDI-TOF. Details: [email or web address]  Organism: Blood Culture PCR  Escherichia coli ESBL (09-08-24 @ 06:54)  Organism: Escherichia coli ESBL (09-08-24 @ 06:54)      Method Type: NEFTALI      -  Ampicillin: R >16 These ampicillin results predict results for amoxicillin      -  Ampicillin/Sulbactam: R 16/8      -  Aztreonam: R >16      -  Cefazolin: R >16      -  Cefepime: R >16      -  Ceftriaxone: R >32      -  Ciprofloxacin: R >2      -  Ertapenem: S <=0.5      -  Gentamicin: S <=2      -  Imipenem: S <=1      -  Levofloxacin: R >4      -  Meropenem: S <=1      -  Piperacillin/Tazobactam: R <=8      -  Tobramycin: R >8      -  Trimethoprim/Sulfamethoxazole: S <=0.5/9.5  Organism: Blood Culture PCR (09-08-24 @ 06:54)      Method Type: PCR      -  Escherichia coli: Detec      -  ESBL: Detec      -  CTX-M Resistance Marker: Detec       Urinalysis with Rflx Culture (collected 09-05-24 @ 22:30)         RADIOLOGY & ADDITIONAL TESTS: Reviewed.   INTERVAL HPI/OVERNIGHT EVENTS: Hypertensive overnight and given labetalol and hydralazine. Hypertension improved after fever subsided following administration of tylenol.    SUBJECTIVE: Patient seen and examined at bedside. Does not respond to commands or answer orientation questions. Interpreters utilized: IDs [id number]    VITAL SIGNS:  ICU Vital Signs Last 24 Hrs  T(C): 37 (08 Sep 2024 04:00), Max: 38.6 (08 Sep 2024 02:00)  T(F): 98.6 (08 Sep 2024 04:00), Max: 101.5 (08 Sep 2024 02:00)  HR: 80 (08 Sep 2024 06:00) (74 - 106)  BP: 126/48 (08 Sep 2024 06:00) (114/40 - 199/54)  BP(mean): 63 (08 Sep 2024 06:00) (56 - 115)  ABP: --  ABP(mean): --  RR: 23 (08 Sep 2024 06:00) (18 - 29)  SpO2: 99% (08 Sep 2024 06:00) (90% - 100%)    O2 Parameters below as of 08 Sep 2024 06:00  Patient On (Oxygen Delivery Method): nasal cannula  O2 Flow (L/min): 2    Plateau pressure:   P/F ratio:     09-07 @ 07:01  -  09-08 @ 07:00  --------------------------------------------------------  IN: 250 mL / OUT: 1065 mL / NET: -815 mL      CAPILLARY BLOOD GLUCOSE        PHYSICAL EXAM:    GENERAL: NAD, lying in bed comfortably  HEAD:  Atraumatic, normocephalic  EYES: EOMI, PERRL, conjunctiva and sclera clear  NECK: Supple, trachea midline  HEART: Regular rate and rhythm, no murmurs, rubs, or gallops  LUNGS: Unlabored respirations.  Clear to auscultation bilaterally, no crackles, wheezing, or rhonchi  ABDOMEN: Soft, nontender, nondistended  EXTREMITIES: No clubbing, cyanosis, or edema, ecchymoses noted to left lower extremity  NERVOUS SYSTEM:  A&Ox0, not following commands, moving all extremities, no apparent focal deficits     MEDICATIONS:  MEDICATIONS  (STANDING):  chlorhexidine 2% Cloths 1 Application(s) Topical <User Schedule>  ertapenem  IVPB 500 milliGRAM(s) IV Intermittent every 24 hours  heparin   Injectable 5000 Unit(s) SubCutaneous every 8 hours  sevelamer carbonate 800 milliGRAM(s) Oral three times a day with meals    MEDICATIONS  (PRN):      ALLERGIES:  Allergies    No Known Allergies    Intolerances        LABS:                        8.7    9.65  )-----------( 62       ( 08 Sep 2024 03:10 )             25.4     09-08    143  |  98  |  143<H>  ----------------------------<  114<H>  4.7   |  20<L>  |  6.61<H>    Ca    8.1<L>      08 Sep 2024 03:10  Phos  11.5     09-08  Mg     3.10     09-08    TPro  5.7<L>  /  Alb  2.4<L>  /  TBili  0.4  /  DBili  x   /  AST  25  /  ALT  13  /  AlkPhos  101  09-08    PT/INR - ( 08 Sep 2024 03:10 )   PT: 12.9 sec;   INR: 1.15 ratio           Urinalysis Basic - ( 08 Sep 2024 03:10 )    Color: x / Appearance: x / SG: x / pH: x  Gluc: 114 mg/dL / Ketone: x  / Bili: x / Urobili: x   Blood: x / Protein: x / Nitrite: x   Leuk Esterase: x / RBC: x / WBC x   Sq Epi: x / Non Sq Epi: x / Bacteria: x      ABG:      vBG:  pH, Venous: 7.41 (09-08-24 @ 03:10)  pCO2, Venous: 35 mmHg (09-08-24 @ 03:10)  pO2, Venous: 78 mmHg (09-08-24 @ 03:10)  HCO3, Venous: 22 mmol/L (09-08-24 @ 03:10)  pH, Venous: 7.40 (09-07-24 @ 14:45)  pCO2, Venous: 48 mmHg (09-07-24 @ 14:45)  pO2, Venous: 29 mmHg (09-07-24 @ 14:45)  HCO3, Venous: 30 mmol/L (09-07-24 @ 14:45)    Micro:    Culture - Blood (collected 09-05-24 @ 22:07)  Source: .Blood Blood-Peripheral  Gram Stain (09-06-24 @ 11:19):    Growth in anaerobic bottle: Gram Negative Rods    Growth in aerobic bottle: Gram Negative Rods  Final Report (09-08-24 @ 06:54):    Growth in aerobic and anaerobic bottles: Escherichia coli ESBL    See previous culture [id number]    Culture - Blood (collected 09-05-24 @ 20:46)  Source: .Blood Blood-Peripheral  Gram Stain (09-06-24 @ 10:57):    Growth in aerobic bottle: Gram Negative Rods    Growth in anaerobic bottle: Gram Negative Rods  Final Report (09-08-24 @ 06:54):    Growth in aerobic and anaerobic bottles: Escherichia coli ESBL    Direct identification is available within approximately 3-5    hours either by Blood Panel Multiplexed PCR or Direct    MALDI-TOF. Details: [email or web address]  Organism: Blood Culture PCR  Escherichia coli ESBL (09-08-24 @ 06:54)  Organism: Escherichia coli ESBL (09-08-24 @ 06:54)      Method Type: NEFTALI      -  Ampicillin: R >16 These ampicillin results predict results for amoxicillin      -  Ampicillin/Sulbactam: R 16/8      -  Aztreonam: R >16      -  Cefazolin: R >16      -  Cefepime: R >16      -  Ceftriaxone: R >32      -  Ciprofloxacin: R >2      -  Ertapenem: S <=0.5      -  Gentamicin: S <=2      -  Imipenem: S <=1      -  Levofloxacin: R >4      -  Meropenem: S <=1      -  Piperacillin/Tazobactam: R <=8      -  Tobramycin: R >8      -  Trimethoprim/Sulfamethoxazole: S <=0.5/9.5  Organism: Blood Culture PCR (09-08-24 @ 06:54)      Method Type: PCR      -  Escherichia coli: Detec      -  ESBL: Detec      -  CTX-M Resistance Marker: Detec       Urinalysis with Rflx Culture (collected 09-05-24 @ 22:30)         RADIOLOGY & ADDITIONAL TESTS: Reviewed.

## 2024-09-08 NOTE — CHART NOTE - [id number]_GEN_A_CORE
MICU Transfer Note    Transfer from: MICU  Transfer to:  (  ) Medicine    (  ) Telemetry    (  ) RCU    (  ) Palliative    (  ) Stroke Unit    (  ) _______________    Accepting physician:    HPI:  Ms. Aggie Call is a 59yo woman, French speaking, h/o rheumatoid arthritis on MTX, HTN, HLD, and GERD who presents for generalized weakness, AMS, fever/chill for 3-4 days. Per son, patient was given Cipro by PCP for symptoms c/f UTI about 10 days ago, after which she has been becoming more confused, lethargic, throat pain with worsening PO intake and self-discontinued all medications for 3 days.    In the ED, pt was found to be febrile, tachypneic, /107, and increased WOB. Labs notable for K 6.9 (hemolyzed), repeat 5.9 with no significant change on EKG, Scr 6.86, VBG 7.23/30/35/12, repeat VBG 7.16/43/33/13. CT showed Cystitis with ascending ureteritis and pyelonephritis. Pt was given Lokelma, Ca-gluconate, and insulin/detrose for hyperkalemia, LR and Zosyn in the setting of UTI, and placed on bicarb ggt for acidosis.     Pt was admitted to MICU for possible urgent HD and continued monitoring given worsening acidosis on repeat vbg.       MICU COURSE:          For Follow-Up:        ASSESSMENT & PLAN:   ===================NEURO===================  AAOx0 - functional at baseline  #Lethargic, altered mental status, improving   Likely 2/2 to complicated UTI vs. uremic encephalopathy vs. acidosis  >s/p bicarb gtt  >BCx (+): E.coli ESBL CTX resistant  >continue with ertapenem 500mg qd (renal dose)    ==================CARDIAC===================  #HTN  #HLD  on home losartan 50 mg, atorvastatin  hypertension here in the hospital follows when patient has fever, continue to monitor and give acetaminophen when febrile  >holding home meds  >follow-up with family about what blood pressure is like at home    ===================PULM====================  #Acidotic with respiratory compensation, improving  increased WOB in the ED  >s/p BiPAP  >on 2L NC    ===================RENAL====================  #acute renal failure  #metabolic acidosis, resolved  #hyperkalemia, resolved  #uremic  Scr 9/8 is 6.61  BUN 9/8 increased to 143  Likely 2/2 to complicated UTI vs. hypovolemic (decreased po intake, diarrhea iso misused miralax)    >s/p bicarb gtt  >monitor electrolytes  >Nephro following, plan for HD today  >1L LR    =====================GI======================  #Diet  NPO, NGT inserted today, start tube feeds once position is confirmed    #GERD  on home dexlansoprazole   >hold home meds    ===================HEME/ONC=================  #RA  on home MTX 2.5 mg 6 tablets a week  last took on 9/2    #DVT ppx  Hep subQ q8h    ======================ID========================  #recent UTI treated with Cipro (incomplete, self-discontinued 3-4 days ago)  #Cystitis with ascending ureteritis and pyelonephritis on CT  >BCx (+): E.coli ESBL CTX resistant, UCx (+) E.coli  > s/p zosyn (9/5-9/7)  > continue with ertapenem 500mg qd (renal dose) (9/6 - )    =====================ENDO=================  #prediabetes (last A1c of 6.2)    =====================ETHICS=======================  Full Code          Vital Signs Last 24 Hrs  T(C): 37.6 (08 Sep 2024 21:45), Max: 38.6 (08 Sep 2024 02:00)  T(F): 99.7 (08 Sep 2024 21:45), Max: 101.5 (08 Sep 2024 02:00)  HR: 80 (08 Sep 2024 21:45) (76 - 106)  BP: 124/53 (08 Sep 2024 21:45) (104/47 - 198/62)  BP(mean): 61 (08 Sep 2024 21:00) (56 - 101)  RR: 22 (08 Sep 2024 21:45) (21 - 41)  SpO2: 100% (08 Sep 2024 21:45) (91% - 100%)    Parameters below as of 08 Sep 2024 21:45  Patient On (Oxygen Delivery Method): nasal cannula  O2 Flow (L/min): 2    I&O's Summary    07 Sep 2024 07:01  -  08 Sep 2024 07:00  --------------------------------------------------------  IN: 250 mL / OUT: 1065 mL / NET: -815 mL    08 Sep 2024 07:01  -  08 Sep 2024 22:49  --------------------------------------------------------  IN: 497.6 mL / OUT: 1185 mL / NET: -687.4 mL          MEDICATIONS  (STANDING):  chlorhexidine 2% Cloths 1 Application(s) Topical <User Schedule>  chlorhexidine 4% Liquid 1 Application(s) Topical <User Schedule>  dexMEDEtomidine Infusion 0.5 MICROgram(s)/kG/Hr (7.89 mL/Hr) IV Continuous <Continuous>  ertapenem  IVPB 500 milliGRAM(s) IV Intermittent every 24 hours  heparin   Injectable 5000 Unit(s) SubCutaneous every 8 hours  sevelamer carbonate 1600 milliGRAM(s) Oral three times a day with meals    MEDICATIONS  (PRN):  sodium chloride 0.9% lock flush 10 milliLiter(s) IV Push every 1 hour PRN Pre/post blood products, medications, blood draw, and to maintain line patency        LABS                                            8.6                   Neurophils% (auto):   x      (09-08 @ 14:16):    7.11 )-----------(54           Lymphocytes% (auto):  x                                             27.2                   Eosinphils% (auto):   x        Manual%: Neutrophils x    ; Lymphocytes x    ; Eosinophils x    ; Bands%: x    ; Blasts x                                    143    |  100    |  147                 Calcium: 8.2   / iCa: x      (09-08 @ 14:16)    ----------------------------<  133       Magnesium: 3.20                             4.9     |  18     |  6.55             Phosphorous: 11.7     TPro  5.6    /  Alb  2.1    /  TBili  0.5    /  DBili  x      /  AST  23     /  ALT  13     /  AlkPhos  98     08 Sep 2024 14:16    ( 09-08 @ 03:10 )   PT: 12.9 sec;   INR: 1.15 ratio  aPTT: x MICU Transfer Note    Transfer from: MICU  Transfer to:  (  ) Medicine    (  ) Telemetry    (  ) RCU    (  ) Palliative    (  ) Stroke Unit    (  ) _______________    Accepting physician:    DANIEL:  Ms. Aggie Call is a 57yo woman, Upper sorbian speaking, h/o rheumatoid arthritis on MTX, HTN, HLD, and GERD who presents for generalized weakness, AMS, fever/chill for 3-4 days. Per son, patient was given Cipro by PCP for symptoms c/f UTI about 10 days ago, after which she has been becoming more confused, lethargic, throat pain with worsening PO intake and self-discontinued all medications for 3 days.    In the ED, pt was found to be febrile, tachypneic, /107, and increased WOB. Labs notable for K 6.9 (hemolyzed), repeat 5.9 with no significant change on EKG, Scr 6.86, VBG 7.23/30/35/12, repeat VBG 7.16/43/33/13. CT showed Cystitis with ascending ureteritis and pyelonephritis. Pt was given Lokelma, Ca-gluconate, and insulin/detrose for hyperkalemia, LR and Zosyn in the setting of UTI, and placed on bicarb ggt for acidosis.     Pt was admitted to MICU for possible urgent HD and continued monitoring given worsening acidosis on repeat vbg.       MICU COURSE:          For Follow-Up:  [ ] c/w Ertapenem (9/6 - 9/16)  [ ] f/u nephro recs for subsequent HD if indicated      ASSESSMENT & PLAN:   ===================NEURO===================  AAOx0 - functional at baseline  #Lethargic, altered mental status - improved initially per family. However, worsening mental status as of 9/8  Likely 2/2 to complicated UTI vs. uremic encephalopathy   >continue with ertapenem 500mg qd (renal dose)  >Appreciated nephro recs, HD today    ==================CARDIAC===================  #HTN  #HLD  on home losartan 50 mg, atorvastatin  hypertension here in the hospital follows when patient has fever, continue to monitor and give acetaminophen when febrile  >holding home meds  >follow-up with family about what blood pressure is like at home    ===================PULM====================  #Acidotic with respiratory compensation, improving  increased WOB in the ED  >s/p BiPAP  >on 2L NC    ===================RENAL====================  #acute renal failure  #metabolic acidosis, resolved  #hyperkalemia, resolved  #uremic  Worsening BUN, cr    >s/p bicarb gtt  >Nephro following, HD today    =====================GI======================  #Diet  NPO, NGT inserted today, start tube feeds once position is confirmed    #GERD  on home dexlansoprazole   >hold home meds    ===================HEME/ONC=================  #RA  on home MTX 2.5 mg 6 tablets a week  last took 2-3 weeks ago    #DVT ppx  Hep subQ q8h    ======================ID========================  #recent UTI treated with Cipro (incomplete, self-discontinued 3-4 days ago)  #Cystitis with ascending ureteritis and pyelonephritis on CT  >BCx (+): E.coli ESBL CTX resistant, UCx (+) E.coli  > s/p zosyn (9/5-9/7)  > continue with ertapenem 500mg qd (renal dose) (9/6 - 9/16 )    =====================ENDO=================  #prediabetes (last A1c of 6.2)    =====================ETHICS=======================  Full Code          Vital Signs Last 24 Hrs  T(C): 37.6 (08 Sep 2024 21:45), Max: 38.6 (08 Sep 2024 02:00)  T(F): 99.7 (08 Sep 2024 21:45), Max: 101.5 (08 Sep 2024 02:00)  HR: 80 (08 Sep 2024 21:45) (76 - 106)  BP: 124/53 (08 Sep 2024 21:45) (104/47 - 198/62)  BP(mean): 61 (08 Sep 2024 21:00) (56 - 101)  RR: 22 (08 Sep 2024 21:45) (21 - 41)  SpO2: 100% (08 Sep 2024 21:45) (91% - 100%)    Parameters below as of 08 Sep 2024 21:45  Patient On (Oxygen Delivery Method): nasal cannula  O2 Flow (L/min): 2    I&O's Summary    07 Sep 2024 07:01  -  08 Sep 2024 07:00  --------------------------------------------------------  IN: 250 mL / OUT: 1065 mL / NET: -815 mL    08 Sep 2024 07:01  -  08 Sep 2024 22:49  --------------------------------------------------------  IN: 497.6 mL / OUT: 1185 mL / NET: -687.4 mL          MEDICATIONS  (STANDING):  chlorhexidine 2% Cloths 1 Application(s) Topical <User Schedule>  chlorhexidine 4% Liquid 1 Application(s) Topical <User Schedule>  dexMEDEtomidine Infusion 0.5 MICROgram(s)/kG/Hr (7.89 mL/Hr) IV Continuous <Continuous>  ertapenem  IVPB 500 milliGRAM(s) IV Intermittent every 24 hours  heparin   Injectable 5000 Unit(s) SubCutaneous every 8 hours  sevelamer carbonate 1600 milliGRAM(s) Oral three times a day with meals    MEDICATIONS  (PRN):  sodium chloride 0.9% lock flush 10 milliLiter(s) IV Push every 1 hour PRN Pre/post blood products, medications, blood draw, and to maintain line patency        LABS                                            8.6                   Neurophils% (auto):   x      (09-08 @ 14:16):    7.11 )-----------(54           Lymphocytes% (auto):  x                                             27.2                   Eosinphils% (auto):   x        Manual%: Neutrophils x    ; Lymphocytes x    ; Eosinophils x    ; Bands%: x    ; Blasts x                                    143    |  100    |  147                 Calcium: 8.2   / iCa: x      (09-08 @ 14:16)    ----------------------------<  133       Magnesium: 3.20                             4.9     |  18     |  6.55             Phosphorous: 11.7     TPro  5.6    /  Alb  2.1    /  TBili  0.5    /  DBili  x      /  AST  23     /  ALT  13     /  AlkPhos  98     08 Sep 2024 14:16    ( 09-08 @ 03:10 )   PT: 12.9 sec;   INR: 1.15 ratio  aPTT: x MICU Transfer Note    Transfer from: MICU  Transfer to:  (  ) Medicine    (  ) Telemetry    (  ) RCU    (  ) Palliative    (  ) Stroke Unit    (  ) _______________    Accepting physician:    DANIEL:  Ms. Aggie Call is a 59yo woman, Pashto speaking, h/o rheumatoid arthritis on MTX, HTN, HLD, and GERD who presents for generalized weakness, AMS, fever/chill for 3-4 days. Per son, patient was given Cipro by PCP for symptoms c/f UTI about 10 days ago, after which she has been becoming more confused, lethargic, throat pain with worsening PO intake and self-discontinued all medications for 3 days.    In the ED, pt was found to be febrile, tachypneic, /107, and increased WOB. Labs notable for K 6.9 (hemolyzed), repeat 5.9 with no significant change on EKG, Scr 6.86, VBG 7.23/30/35/12, repeat VBG 7.16/43/33/13. CT showed Cystitis with ascending ureteritis and pyelonephritis. Pt was given Lokelma, Ca-gluconate, and insulin/detrose for hyperkalemia, LR and Zosyn in the setting of UTI, and placed on bicarb ggt for acidosis.     Pt was admitted to MICU for possible urgent HD and continued monitoring given worsening acidosis on repeat vbg.       MICU COURSE:  Pt with symptoms c/f acidosis, acute renal failure, and sepsis with gram negative bacteremia. Ptwas started on bicarb gtt, BiPAP for increased wob, and Ertapenem x10 days. Metabolic acidosis improved, pt subsequently monitored off BiPAP and bicarb. Her mental status initially improved per family, however, showed signed of worsening on 9/8 i/s/o worsening renal function and uremia. Nephro was consulted and recommended HD. Pt is now s/p HD       For Follow-Up:  [ ] c/w Ertapenem (9/6 - 9/16)  [ ] f/u nephro recs for subsequent HD if indicated      ASSESSMENT & PLAN:   ===================NEURO===================  AAOx0 - functional at baseline  #Lethargic, altered mental status - improved initially per family. However, worsening mental status as of 9/8  Likely 2/2 to complicated UTI vs. uremic encephalopathy   >continue with ertapenem 500mg qd (renal dose)  >Appreciated nephro recs, HD today    ==================CARDIAC===================  #HTN  #HLD  on home losartan 50 mg, atorvastatin  hypertension here in the hospital follows when patient has fever, continue to monitor and give acetaminophen when febrile  >holding home meds  >follow-up with family about what blood pressure is like at home    ===================PULM====================  #acute hypoxemic respiratory failure, improving  increased WOB in the ED  >s/p BiPAP  >on 2L NC    ===================RENAL====================  #acute renal failure  #uremic  #hyperphosphatemia  #metabolic acidosis, resolved  #hyperkalemia, resolved    Worsening BUN, cr    >s/p bicarb gtt  >Nephro following, HD today  >sevelamer 1600 TID  =====================GI======================  #Diet  NPO, NGT inserted today, start tube feeds once position is confirmed    #GERD  on home dexlansoprazole   >hold home meds    ===================HEME/ONC=================  #RA  on home MTX 2.5 mg 6 tablets a week  last took 2-3 weeks ago    #DVT ppx  Hep subQ q8h    ======================ID========================  #recent UTI treated with Cipro (incomplete, self-discontinued 3-4 days ago)  #Cystitis with ascending ureteritis and pyelonephritis on CT  >BCx (+): E.coli ESBL CTX resistant, UCx (+) E.coli  > s/p zosyn (9/5-9/7)  > continue with ertapenem 500mg qd (renal dose) (9/6 - 9/16 )    =====================ENDO=================  #prediabetes (last A1c of 6.2)    =====================ETHICS=======================  Full Code          Vital Signs Last 24 Hrs  T(C): 37.6 (08 Sep 2024 21:45), Max: 38.6 (08 Sep 2024 02:00)  T(F): 99.7 (08 Sep 2024 21:45), Max: 101.5 (08 Sep 2024 02:00)  HR: 80 (08 Sep 2024 21:45) (76 - 106)  BP: 124/53 (08 Sep 2024 21:45) (104/47 - 198/62)  BP(mean): 61 (08 Sep 2024 21:00) (56 - 101)  RR: 22 (08 Sep 2024 21:45) (21 - 41)  SpO2: 100% (08 Sep 2024 21:45) (91% - 100%)    Parameters below as of 08 Sep 2024 21:45  Patient On (Oxygen Delivery Method): nasal cannula  O2 Flow (L/min): 2    I&O's Summary    07 Sep 2024 07:01  -  08 Sep 2024 07:00  --------------------------------------------------------  IN: 250 mL / OUT: 1065 mL / NET: -815 mL    08 Sep 2024 07:01  -  08 Sep 2024 22:49  --------------------------------------------------------  IN: 497.6 mL / OUT: 1185 mL / NET: -687.4 mL          MEDICATIONS  (STANDING):  chlorhexidine 2% Cloths 1 Application(s) Topical <User Schedule>  chlorhexidine 4% Liquid 1 Application(s) Topical <User Schedule>  dexMEDEtomidine Infusion 0.5 MICROgram(s)/kG/Hr (7.89 mL/Hr) IV Continuous <Continuous>  ertapenem  IVPB 500 milliGRAM(s) IV Intermittent every 24 hours  heparin   Injectable 5000 Unit(s) SubCutaneous every 8 hours  sevelamer carbonate 1600 milliGRAM(s) Oral three times a day with meals    MEDICATIONS  (PRN):  sodium chloride 0.9% lock flush 10 milliLiter(s) IV Push every 1 hour PRN Pre/post blood products, medications, blood draw, and to maintain line patency        LABS                                            8.6                   Neurophils% (auto):   x      (09-08 @ 14:16):    7.11 )-----------(54           Lymphocytes% (auto):  x                                             27.2                   Eosinphils% (auto):   x        Manual%: Neutrophils x    ; Lymphocytes x    ; Eosinophils x    ; Bands%: x    ; Blasts x                                    143    |  100    |  147                 Calcium: 8.2   / iCa: x      (09-08 @ 14:16)    ----------------------------<  133       Magnesium: 3.20                             4.9     |  18     |  6.55             Phosphorous: 11.7     TPro  5.6    /  Alb  2.1    /  TBili  0.5    /  DBili  x      /  AST  23     /  ALT  13     /  AlkPhos  98     08 Sep 2024 14:16    ( 09-08 @ 03:10 )   PT: 12.9 sec;   INR: 1.15 ratio  aPTT: x MICU Transfer Note    Transfer from: MICU  Transfer to:  (  ) Medicine    (  ) Telemetry    (  ) RCU    (  ) Palliative    (  ) Stroke Unit    (  ) _______________    Accepting physician:    DANIEL:  Ms. Aggie Call is a 59yo woman, Kyrgyz speaking, h/o rheumatoid arthritis on MTX, HTN, HLD, and GERD who presents for generalized weakness, AMS, fever/chill for 3-4 days. Per son, patient was given Cipro by PCP for symptoms c/f UTI about 10 days ago, after which she has been becoming more confused, lethargic, throat pain with worsening PO intake and self-discontinued all medications for 3 days.    In the ED, pt was found to be febrile, tachypneic, /107, and increased WOB. Labs notable for K 6.9 (hemolyzed), repeat 5.9 with no significant change on EKG, Scr 6.86, VBG 7.23/30/35/12, repeat VBG 7.16/43/33/13. CT showed Cystitis with ascending ureteritis and pyelonephritis. Pt was given Lokelma, Ca-gluconate, and insulin/detrose for hyperkalemia, LR and Zosyn in the setting of UTI, and placed on bicarb ggt for acidosis.     Pt was admitted to MICU for possible urgent HD and continued monitoring given worsening acidosis on repeat vbg.       MICU COURSE:  Pt with symptoms c/f acidosis, acute renal failure, and sepsis with gram negative bacteremia. Pt was started on bicarb gtt, BiPAP for increased wob, and Ertapenem x10 days. Metabolic acidosis improved, and pt subsequently monitored off BiPAP and bicarb. Course was c/b fever spikes and associated hypertension, improved with Tylenol and labetalol/hydralazine. Her mental status initially improved per family, however, showed signed of worsening on 9/8 i/s/o worsening renal function and uremia. Nephro was consulted and recommended HD. Pt is now on 2L NC and s/p HD x1      For Follow-Up:  [ ] c/w Ertapenem (9/6 - 9/16)  [ ] f/u nephro recs for subsequent HD if indicated      ASSESSMENT & PLAN:   ===================NEURO===================  AAOx0 - functional at baseline  #Lethargic, altered mental status - improved initially per family. However, worsening mental status as of 9/8  Likely 2/2 to complicated UTI vs. uremic encephalopathy   >continue with ertapenem 500mg qd (renal dose)  >Appreciated nephro recs, HD today    ==================CARDIAC===================  #HTN  #HLD  on home losartan 50 mg, atorvastatin  hypertension here in the hospital follows when patient has fever, continue to monitor and give acetaminophen when febrile  >holding home meds  >follow-up with family about what blood pressure is like at home    ===================PULM====================  #acute hypoxemic respiratory failure, improving  increased WOB in the ED  >s/p BiPAP  >on 2L NC    ===================RENAL====================  #acute renal failure  #uremic  #hyperphosphatemia  #metabolic acidosis, resolved  #hyperkalemia, resolved    Worsening BUN, cr    >s/p bicarb gtt  >Nephro following, HD today  >sevelamer 1600 TID  =====================GI======================  #Diet  NPO, NGT inserted today, start tube feeds once position is confirmed    #GERD  on home dexlansoprazole   >hold home meds    ===================HEME/ONC=================  #RA  on home MTX 2.5 mg 6 tablets a week  last took 2-3 weeks ago    #DVT ppx  Hep subQ q8h    ======================ID========================  #recent UTI treated with Cipro (incomplete, self-discontinued 3-4 days ago)  #Cystitis with ascending ureteritis and pyelonephritis on CT  >BCx (+): E.coli ESBL CTX resistant, UCx (+) E.coli  > s/p zosyn (9/5-9/7)  > continue with ertapenem 500mg qd (renal dose) (9/6 - 9/16 )    =====================ENDO=================  #prediabetes (last A1c of 6.2)    =====================ETHICS=======================  Full Code          Vital Signs Last 24 Hrs  T(C): 37.6 (08 Sep 2024 21:45), Max: 38.6 (08 Sep 2024 02:00)  T(F): 99.7 (08 Sep 2024 21:45), Max: 101.5 (08 Sep 2024 02:00)  HR: 80 (08 Sep 2024 21:45) (76 - 106)  BP: 124/53 (08 Sep 2024 21:45) (104/47 - 198/62)  BP(mean): 61 (08 Sep 2024 21:00) (56 - 101)  RR: 22 (08 Sep 2024 21:45) (21 - 41)  SpO2: 100% (08 Sep 2024 21:45) (91% - 100%)    Parameters below as of 08 Sep 2024 21:45  Patient On (Oxygen Delivery Method): nasal cannula  O2 Flow (L/min): 2    I&O's Summary    07 Sep 2024 07:01  -  08 Sep 2024 07:00  --------------------------------------------------------  IN: 250 mL / OUT: 1065 mL / NET: -815 mL    08 Sep 2024 07:01  -  08 Sep 2024 22:49  --------------------------------------------------------  IN: 497.6 mL / OUT: 1185 mL / NET: -687.4 mL          MEDICATIONS  (STANDING):  chlorhexidine 2% Cloths 1 Application(s) Topical <User Schedule>  chlorhexidine 4% Liquid 1 Application(s) Topical <User Schedule>  dexMEDEtomidine Infusion 0.5 MICROgram(s)/kG/Hr (7.89 mL/Hr) IV Continuous <Continuous>  ertapenem  IVPB 500 milliGRAM(s) IV Intermittent every 24 hours  heparin   Injectable 5000 Unit(s) SubCutaneous every 8 hours  sevelamer carbonate 1600 milliGRAM(s) Oral three times a day with meals    MEDICATIONS  (PRN):  sodium chloride 0.9% lock flush 10 milliLiter(s) IV Push every 1 hour PRN Pre/post blood products, medications, blood draw, and to maintain line patency        LABS                                            8.6                   Neurophils% (auto):   x      (09-08 @ 14:16):    7.11 )-----------(54           Lymphocytes% (auto):  x                                             27.2                   Eosinphils% (auto):   x        Manual%: Neutrophils x    ; Lymphocytes x    ; Eosinophils x    ; Bands%: x    ; Blasts x                                    143    |  100    |  147                 Calcium: 8.2   / iCa: x      (09-08 @ 14:16)    ----------------------------<  133       Magnesium: 3.20                             4.9     |  18     |  6.55             Phosphorous: 11.7     TPro  5.6    /  Alb  2.1    /  TBili  0.5    /  DBili  x      /  AST  23     /  ALT  13     /  AlkPhos  98     08 Sep 2024 14:16    ( 09-08 @ 03:10 )   PT: 12.9 sec;   INR: 1.15 ratio  aPTT: x MICU Transfer Note    Transfer from: MICU  Transfer to:  (  ) Medicine    (  ) Telemetry    (  ) RCU    (  ) Palliative    (  ) Stroke Unit    (  ) _______________    Accepting physician:    DANIEL:  Ms. Aggie Call is a 59yo woman, Greek speaking, h/o rheumatoid arthritis on MTX, HTN, HLD, and GERD who presents for generalized weakness, AMS, fever/chill for 3-4 days. Per son, patient was given Cipro by PCP for symptoms c/f UTI about 10 days ago, after which she has been becoming more confused, lethargic, throat pain with worsening PO intake and self-discontinued all medications for 3 days.    In the ED, pt was found to be febrile, tachypneic, /107, and increased WOB. Labs notable for K 6.9 (hemolyzed), repeat 5.9 with no significant change on EKG, Scr 6.86, VBG 7.23/30/35/12, repeat VBG 7.16/43/33/13. CT showed Cystitis with ascending ureteritis and pyelonephritis. Pt was given Lokelma, Ca-gluconate, and insulin/detrose for hyperkalemia, LR and Zosyn in the setting of UTI, and placed on bicarb ggt for acidosis.     Pt was admitted to MICU for possible urgent HD and continued monitoring given worsening acidosis on repeat vbg.       MICU COURSE:  Pt with symptoms c/f acidosis, acute renal failure, complicated UTI, and sepsis with gram negative bacteremia. Pt was started on bicarb gtt, BiPAP for increased wob, and Ertapenem x10 days. Metabolic acidosis improved, and pt subsequently monitored off BiPAP and bicarb. Course was c/b fever spikes and associated hypertension, improved with Tylenol and labetalol/hydralazine. Her mental status initially improved per family, however, showed signed of worsening on 9/8 i/s/o worsening renal function and uremia. Nephro was consulted and recommended HD. Pt is now on 2L NC and s/p HD x1      For Follow-Up:  [ ] c/w Ertapenem (9/6 - 9/16)  [ ] f/u nephro recs for subsequent HD if indicated      ASSESSMENT & PLAN:   ===================NEURO===================  AAOx0 - functional at baseline  #Lethargic, altered mental status - improved initially per family. However, worsening mental status as of 9/8  Likely 2/2 to complicated UTI vs. uremic encephalopathy   >continue with ertapenem 500mg qd (renal dose)  >Appreciated nephro recs, HD today    ==================CARDIAC===================  #HTN  #HLD  on home losartan 50 mg, atorvastatin  hypertension here in the hospital follows when patient has fever, continue to monitor and give acetaminophen when febrile  >holding home meds  >follow-up with family about what blood pressure is like at home    ===================PULM====================  #acute hypoxemic respiratory failure, improving  increased WOB in the ED  >s/p BiPAP  >on 2L NC    ===================RENAL====================  #acute renal failure  #uremic  #hyperphosphatemia  #metabolic acidosis, resolved  #hyperkalemia, resolved    Worsening BUN, cr    >s/p bicarb gtt  >Nephro following, HD today  >sevelamer 1600 TID  =====================GI======================  #Diet  NPO, NGT inserted today, start tube feeds once position is confirmed    #GERD  on home dexlansoprazole   >hold home meds    ===================HEME/ONC=================  #RA  on home MTX 2.5 mg 6 tablets a week  last took 2-3 weeks ago    #DVT ppx  Hep subQ q8h    ======================ID========================  #recent UTI treated with Cipro (incomplete, self-discontinued 3-4 days ago)  #Cystitis with ascending ureteritis and pyelonephritis on CT  >BCx (+): E.coli ESBL CTX resistant, UCx (+) E.coli  > s/p zosyn (9/5-9/7)  > continue with ertapenem 500mg qd (renal dose) (9/6 - 9/16 )    =====================ENDO=================  #prediabetes (last A1c of 6.2)    =====================ETHICS=======================  Full Code          Vital Signs Last 24 Hrs  T(C): 37.6 (08 Sep 2024 21:45), Max: 38.6 (08 Sep 2024 02:00)  T(F): 99.7 (08 Sep 2024 21:45), Max: 101.5 (08 Sep 2024 02:00)  HR: 80 (08 Sep 2024 21:45) (76 - 106)  BP: 124/53 (08 Sep 2024 21:45) (104/47 - 198/62)  BP(mean): 61 (08 Sep 2024 21:00) (56 - 101)  RR: 22 (08 Sep 2024 21:45) (21 - 41)  SpO2: 100% (08 Sep 2024 21:45) (91% - 100%)    Parameters below as of 08 Sep 2024 21:45  Patient On (Oxygen Delivery Method): nasal cannula  O2 Flow (L/min): 2    I&O's Summary    07 Sep 2024 07:01  -  08 Sep 2024 07:00  --------------------------------------------------------  IN: 250 mL / OUT: 1065 mL / NET: -815 mL    08 Sep 2024 07:01  -  08 Sep 2024 22:49  --------------------------------------------------------  IN: 497.6 mL / OUT: 1185 mL / NET: -687.4 mL          MEDICATIONS  (STANDING):  chlorhexidine 2% Cloths 1 Application(s) Topical <User Schedule>  chlorhexidine 4% Liquid 1 Application(s) Topical <User Schedule>  dexMEDEtomidine Infusion 0.5 MICROgram(s)/kG/Hr (7.89 mL/Hr) IV Continuous <Continuous>  ertapenem  IVPB 500 milliGRAM(s) IV Intermittent every 24 hours  heparin   Injectable 5000 Unit(s) SubCutaneous every 8 hours  sevelamer carbonate 1600 milliGRAM(s) Oral three times a day with meals    MEDICATIONS  (PRN):  sodium chloride 0.9% lock flush 10 milliLiter(s) IV Push every 1 hour PRN Pre/post blood products, medications, blood draw, and to maintain line patency        LABS                                            8.6                   Neurophils% (auto):   x      (09-08 @ 14:16):    7.11 )-----------(54           Lymphocytes% (auto):  x                                             27.2                   Eosinphils% (auto):   x        Manual%: Neutrophils x    ; Lymphocytes x    ; Eosinophils x    ; Bands%: x    ; Blasts x                                    143    |  100    |  147                 Calcium: 8.2   / iCa: x      (09-08 @ 14:16)    ----------------------------<  133       Magnesium: 3.20                             4.9     |  18     |  6.55             Phosphorous: 11.7     TPro  5.6    /  Alb  2.1    /  TBili  0.5    /  DBili  x      /  AST  23     /  ALT  13     /  AlkPhos  98     08 Sep 2024 14:16    ( 09-08 @ 03:10 )   PT: 12.9 sec;   INR: 1.15 ratio  aPTT: x MICU Transfer Note    Transfer from: MICU  Transfer to:  (  ) Medicine    (  ) Telemetry    (  ) RCU    (  ) Palliative    (  ) Stroke Unit    (  ) _______________    Accepting physician:    DANIEL:  Ms. Aggie Call is a 57yo woman, French speaking, h/o rheumatoid arthritis on MTX, HTN, HLD, and GERD who presents for generalized weakness, AMS, fever/chill for 3-4 days. Per son, patient was given Cipro by PCP for symptoms c/f UTI about 10 days ago, after which she has been becoming more confused, lethargic, throat pain with worsening PO intake and self-discontinued all medications for 3 days.    In the ED, pt was found to be febrile, tachypneic, /107, and increased WOB. Labs notable for K 6.9 (hemolyzed), repeat 5.9 with no significant change on EKG, Scr 6.86, VBG 7.23/30/35/12, repeat VBG 7.16/43/33/13. CT showed Cystitis with ascending ureteritis and pyelonephritis. Pt was given Lokelma, Ca-gluconate, and insulin/detrose for hyperkalemia, LR and Zosyn in the setting of UTI, and placed on bicarb ggt for acidosis.     Pt was admitted to MICU for possible urgent HD and continued monitoring given worsening acidosis on repeat vbg.       MICU COURSE:  Pt with symptoms c/f acidosis, acute renal failure, complicated UTI, and sepsis with gram negative bacteremia. Pt was started on bicarb gtt, BiPAP for increased wob, and Ertapenem x10 days. Metabolic acidosis improved, and pt subsequently monitored off BiPAP and bicarb. Course was c/b fever spikes and associated hypertension, improved with Tylenol and labetalol/hydralazine. Course c/b worsening LINA and AMS i/s/o UTI/sepsis and recent IV contrast. Nephro was consulted for hyperuremia, non-oliguria and recommended HD. Pt is now s/p HD x2, more awake and alert, VSS.       For Follow-Up:  [ ] c/w Ertapenem (9/6 - 9/16)  [ ] f/u nephro recs for subsequent HD if indicated      ASSESSMENT & PLAN:   ===================NEURO===================  AAOx0 - functional at baseline  #Lethargic, altered mental status - improved initially per family. However, worsening mental status as of 9/8  Likely 2/2 to complicated UTI vs. uremic encephalopathy   >continue with ertapenem 500mg qd (renal dose)  >Appreciated nephro recs, HD today    ==================CARDIAC===================  #HTN  #HLD  on home losartan 50 mg, atorvastatin  hypertension here in the hospital follows when patient has fever, continue to monitor and give acetaminophen when febrile  >holding home meds  >follow-up with family about what blood pressure is like at home    ===================PULM====================  #acute hypoxemic respiratory failure, improving  increased WOB in the ED  >s/p BiPAP  >on 2L NC    ===================RENAL====================  #acute renal failure  #uremic  #hyperphosphatemia  #metabolic acidosis, resolved  #hyperkalemia, resolved    Worsening BUN, cr    >s/p bicarb gtt  >Nephro following, HD today  >sevelamer 1600 TID  =====================GI======================  #Diet  NPO, NGT inserted today, start tube feeds once position is confirmed    #GERD  on home dexlansoprazole   >hold home meds    ===================HEME/ONC=================  #RA  on home MTX 2.5 mg 6 tablets a week  last took 2-3 weeks ago    #DVT ppx  Hep subQ q8h    ======================ID========================  #recent UTI treated with Cipro (incomplete, self-discontinued 3-4 days ago)  #Cystitis with ascending ureteritis and pyelonephritis on CT  >BCx (+): E.coli ESBL CTX resistant, UCx (+) E.coli  > s/p zosyn (9/5-9/7)  > continue with ertapenem 500mg qd (renal dose) (9/6 - 9/16 )    =====================ENDO=================  #prediabetes (last A1c of 6.2)    =====================ETHICS=======================  Full Code          Vital Signs Last 24 Hrs  T(C): 37.6 (08 Sep 2024 21:45), Max: 38.6 (08 Sep 2024 02:00)  T(F): 99.7 (08 Sep 2024 21:45), Max: 101.5 (08 Sep 2024 02:00)  HR: 80 (08 Sep 2024 21:45) (76 - 106)  BP: 124/53 (08 Sep 2024 21:45) (104/47 - 198/62)  BP(mean): 61 (08 Sep 2024 21:00) (56 - 101)  RR: 22 (08 Sep 2024 21:45) (21 - 41)  SpO2: 100% (08 Sep 2024 21:45) (91% - 100%)    Parameters below as of 08 Sep 2024 21:45  Patient On (Oxygen Delivery Method): nasal cannula  O2 Flow (L/min): 2    I&O's Summary    07 Sep 2024 07:01  -  08 Sep 2024 07:00  --------------------------------------------------------  IN: 250 mL / OUT: 1065 mL / NET: -815 mL    08 Sep 2024 07:01  -  08 Sep 2024 22:49  --------------------------------------------------------  IN: 497.6 mL / OUT: 1185 mL / NET: -687.4 mL          MEDICATIONS  (STANDING):  chlorhexidine 2% Cloths 1 Application(s) Topical <User Schedule>  chlorhexidine 4% Liquid 1 Application(s) Topical <User Schedule>  dexMEDEtomidine Infusion 0.5 MICROgram(s)/kG/Hr (7.89 mL/Hr) IV Continuous <Continuous>  ertapenem  IVPB 500 milliGRAM(s) IV Intermittent every 24 hours  heparin   Injectable 5000 Unit(s) SubCutaneous every 8 hours  sevelamer carbonate 1600 milliGRAM(s) Oral three times a day with meals    MEDICATIONS  (PRN):  sodium chloride 0.9% lock flush 10 milliLiter(s) IV Push every 1 hour PRN Pre/post blood products, medications, blood draw, and to maintain line patency        LABS                                            8.6                   Neurophils% (auto):   x      (09-08 @ 14:16):    7.11 )-----------(54           Lymphocytes% (auto):  x                                             27.2                   Eosinphils% (auto):   x        Manual%: Neutrophils x    ; Lymphocytes x    ; Eosinophils x    ; Bands%: x    ; Blasts x                                    143    |  100    |  147                 Calcium: 8.2   / iCa: x      (09-08 @ 14:16)    ----------------------------<  133       Magnesium: 3.20                             4.9     |  18     |  6.55             Phosphorous: 11.7     TPro  5.6    /  Alb  2.1    /  TBili  0.5    /  DBili  x      /  AST  23     /  ALT  13     /  AlkPhos  98     08 Sep 2024 14:16    ( 09-08 @ 03:10 )   PT: 12.9 sec;   INR: 1.15 ratio  aPTT: x MICU Transfer Note    Transfer from: MICU  Transfer to:  (  ) Medicine    (  ) Telemetry    (  ) RCU    (  ) Palliative    (  ) Stroke Unit    (  ) _______________    Accepting physician:    HPI:  Ms. Aggie Call is a 59yo woman, Azeri speaking, h/o rheumatoid arthritis on MTX, HTN, HLD, and GERD who presents for generalized weakness, AMS, fever/chill for 3-4 days. Per son, patient was given Cipro by PCP for symptoms c/f UTI about 10 days ago, after which she has been becoming more confused, lethargic, throat pain with worsening PO intake and self-discontinued all medications for 3 days.    In the ED, pt was found to be febrile, tachypneic, /107, and increased WOB. Labs notable for K 6.9 (hemolyzed), repeat 5.9 with no significant change on EKG, Scr 6.86, VBG 7.23/30/35/12, repeat VBG 7.16/43/33/13. CT showed Cystitis with ascending ureteritis and pyelonephritis. Pt was given Lokelma, Ca-gluconate, and insulin/detrose for hyperkalemia, LR and Zosyn in the setting of UTI, and placed on bicarb ggt for acidosis.     Pt was admitted to MICU for possible urgent HD and continued monitoring given worsening acidosis on repeat vbg.       MICU COURSE:  Pt with symptoms c/f acidosis, acute renal failure, complicated UTI, and sepsis with gram negative bacteremia. Pt was started on bicarb gtt, BiPAP for increased wob, and Ertapenem x10 days. Metabolic acidosis improved, and pt subsequently monitored off BiPAP and bicarb. Course was c/b fever spikes and associated hypertension, improved with Tylenol and labetalol/hydralazine. Course c/b worsening LINA and AMS i/s/o UTI/sepsis and recent IV contrast. Nephro was consulted for hyperuremia, non-oliguria and recommended HD. Pt is now s/p HD x2, more awake and alert, VSS. CT head 9/9 with no acute changes.      For Follow-Up:  [ ] c/w Ertapenem (9/6 - 9/16)  [ ] f/u nephro recs for subsequent HD if indicated      ASSESSMENT & PLAN:   ===================NEURO===================  AAOx0 - functional at baseline  #Lethargic, altered mental status - improved initially per family. However, worsening mental status as of 9/8  Likely 2/2 to complicated UTI vs. uremic encephalopathy   >continue with ertapenem 500mg qd (renal dose)  >Appreciated nephro recs, HD 9/9    ==================CARDIAC===================  #HTN  #HLD  on home losartan 50 mg, atorvastatin  hypertension here in the hospital follows when patient has fever, continue to monitor and give acetaminophen when febrile  >holding home meds  >follow-up with family about what blood pressure is like at home    ===================PULM====================  #acute hypoxemic respiratory failure, improving  increased WOB in the ED  >s/p BiPAP  >on room air    ===================RENAL====================  #acute renal failure  #uremic  #hyperphosphatemia  #metabolic acidosis, resolved  #hyperkalemia, resolved    Worsening BUN, Cr    >s/p bicarb gtt  >Nephro following, HD today, Cr improving  > euvolemic, net 0 removal over dialysis (9/9)  >sevelamer 1600 TID  =====================GI======================  #Diet  NPO, NGT inserted today, start tube feeds once position is confirmed    #GERD  on home dexlansoprazole   >hold home meds    ===================HEME/ONC=================  #RA  on home MTX 2.5 mg 6 tablets a week  last took 2-3 weeks ago    #DVT ppx  Hep subQ q8h    ======================ID========================  #recent UTI treated with Cipro (incomplete, self-discontinued 3-4 days ago)  #Cystitis with ascending ureteritis and pyelonephritis on CT  >BCx (+): E.coli ESBL CTX resistant, UCx (+) E.coli  > s/p zosyn (9/5-9/7)  > continue with ertapenem 500mg qd (renal dose) (9/6 - 9/16 )  > ID consulted    =====================ENDO=================  #prediabetes (last A1c of 6.2)    =====================ETHICS=======================  Full Code          VITAL SIGNS:  ICU Vital Signs Last 24 Hrs  T(C): 38 (10 Sep 2024 04:00), Max: 38 (10 Sep 2024 00:00)  T(F): 100.4 (10 Sep 2024 04:00), Max: 100.4 (10 Sep 2024 00:00)  HR: 96 (10 Sep 2024 06:00) (83 - 108)  BP: 148/60 (10 Sep 2024 06:00) (138/56 - 189/77)  BP(mean): 83 (10 Sep 2024 06:00) (70 - 128)  ABP: --  ABP(mean): --  RR: 25 (10 Sep 2024 06:00) (19 - 33)  SpO2: 97% (10 Sep 2024 06:00) (95% - 100%)    I&O's Summary    07 Sep 2024 07:01  -  08 Sep 2024 07:00  --------------------------------------------------------  IN: 250 mL / OUT: 1065 mL / NET: -815 mL    08 Sep 2024 07:01  -  08 Sep 2024 22:49  --------------------------------------------------------  IN: 497.6 mL / OUT: 1185 mL / NET: -687.4 mL    09-09 @ 07:01  -  09-10 @ 07:00  --------------------------------------------------------  IN: 850 mL / OUT: 2175 mL / NET: -1325 mL        MEDICATIONS  (STANDING):  chlorhexidine 2% Cloths 1 Application(s) Topical <User Schedule>  ertapenem  IVPB 500 milliGRAM(s) IV Intermittent every 24 hours  heparin   Injectable 5000 Unit(s) SubCutaneous every 8 hours  mupirocin 2% Ointment 1 Application(s) Topical two times a day  Nephro-johny 1 Tablet(s) Oral daily  sevelamer carbonate 1600 milliGRAM(s) Oral three times a day with meals    MEDICATIONS  (PRN):  sodium chloride 0.9% lock flush 10 milliLiter(s) IV Push every 1 hour PRN Pre/post blood products, medications, blood draw, and to maintain line patency        LABS:                        8.0    3.94  )-----------( 65       ( 10 Sep 2024 01:10 )             25.0     09-10    145  |  106  |  66<H>  ----------------------------<  103<H>  4.0   |  23  |  3.44<H>    Ca    8.5      10 Sep 2024 01:10  Phos  4.5     09-10  Mg     2.50     09-10    TPro  6.0  /  Alb  2.6<L>  /  TBili  0.5  /  DBili  x   /  AST  32  /  ALT  16  /  AlkPhos  103  09-10    PT/INR - ( 09 Sep 2024 01:45 )   PT: 11.6 sec;   INR: 1.04 ratio         PTT - ( 10 Sep 2024 01:10 )  PTT:25.2 sec  Urinalysis Basic - ( 10 Sep 2024 01:10 )    Color: x / Appearance: x / SG: x / pH: x  Gluc: 103 mg/dL / Ketone: x  / Bili: x / Urobili: x   Blood: x / Protein: x / Nitrite: x   Leuk Esterase: x / RBC: x / WBC x   Sq Epi: x / Non Sq Epi: x / Bacteria: x        RADIOLOGY & ADDITIONAL TESTS:   < from: CT Head No Cont (09.09.24 @ 16:41) >  There is diffuse cerebral volume loss with prominence of the sulci,   fissures, and cisternal spaces which is normal for the patient's age.   There is mild deep and periventricular white matter hypoattenuation   statistically compatible with microvascular changes given calcific   atherosclerotic disease of the intracranial arteries.

## 2024-09-08 NOTE — PROGRESS NOTE ADULT - ATTENDING COMMENTS
LINA  AMS  Uremia    Pt with poor mental status and worsening renal function today. D/w MICU team, after dialysis catheter placed will plan for first HD today for 2 hrs. Will plan for HD tomorrow as well.     Monitor labs and Is/Os, daily weights

## 2024-09-08 NOTE — PROGRESS NOTE ADULT - ATTENDING COMMENTS
59yo woman, Danish speaking, h/o rheumatoid arthritis on MTX, HTN, HLD, prediabetic, and GERD who presents for generalized weakness, AMS, fever/chill for 3-4 days. Recent UTI treated with Cipro outpatient 10 days ago. CT c/f cystitis with ascending ureteritis and pyelonephritis. Pt found to have hyperkalemia, acidosis, and symptoms c/f acute renal failure. Admitted to MICU for Hypotension from severe sepsis with shock due to pyelonephritis and gram-negative bacteremia on zosyn, f/u cultures, requiring vasopressor support, and Acute hypoxemic respiratory failure in setting of severe metabolic acidosis, sepsis. Tolerating off NIV, but worsening renal failure, and uremic encephalopathy. Family consented to HD today. Appreciate renal recs.

## 2024-09-08 NOTE — PROGRESS NOTE ADULT - ASSESSMENT
59yo woman, Persian speaking, h/o rheumatoid arthritis on MTX, HTN, HLD, prediabetic, and GERD who presents for generalized weakness, AMS, fever/chill for 3-4 days. Recent UTI treated with Cipro outpatient 10 days ago. CT c/f cystitis with ascending ureteritis and pyelonephritis. Pt found to have hyperkalemia, acidosis, and symptoms c/f acute renal failure. Admitted to MICU possible urgent HD.      ===================NEURO===================  AAOx0 - unknown baseline  #Lethargic, altered mental status, improving   Likely 2/2 to complicated UTI vs. uremic encephalopathy vs. acidosis  >s/p bicarb gtt  >BCx (+): E.coli ESBL CTX resistant  >d/c zosyn  >start ertapenem 500mg qd (renal dose)    ==================CARDIAC===================  #HTN  #HLD  on home losartan, atorvastatin  >holding home meds    ===================PULM====================  #Acidotic with respiratory compensation  increased WOB in the ED  >s/p BiPAP  >on 2L NC  ===================RENAL====================  #acute renal failure  #metabolic acidosis  #hyperkalemia  Scr 7.02, repeat 6.9  K 5.9  Likely 2/2 to complicated UTI vs. hypovolemic (decreased po intake, diarrhea iso misused miralax)    >s/p bicarb gtt  >monitor electrolytes  >if symptoms not improved, consider HD  >Nephro following    =====================GI======================  #Diet  NPO while on BiPAP    #GERD  on home dexlansoprazole   >hold home meds    ===================HEME/ONC=================  #RA  on home MTX 2.5 mg 6 tablets a week  last took on 9/2  #DVT ppx  Hep subQ q8h    ======================ID========================  #recent UTI treated with Cipro (incomplete, self-discontinued 3-4 days ago)  #Cystitis with ascending ureteritis and pyelonephritis on CT  >BCx (+): E.coli ESBL CTX resistant, UCx (+) E.coli  >d/c zosyn  >start ertapenem 500mg qd (renal dose)      =====================ENDO=================  #prediabetes (last A1c of 6.2)    =====================ETHICS=======================  Full Code 59yo woman, Icelandic speaking, h/o rheumatoid arthritis on MTX, HTN, HLD, prediabetic, and GERD who presents for generalized weakness, AMS, fever/chill for 3-4 days. Recent UTI treated with Cipro outpatient 10 days ago. CT c/f cystitis with ascending ureteritis and pyelonephritis. Pt found to have hyperkalemia, acidosis, and symptoms c/f acute renal failure. Admitted to MICU possible urgent HD.      ===================NEURO===================  AAOx0 - unknown baseline  #Lethargic, altered mental status, improving   Likely 2/2 to complicated UTI vs. uremic encephalopathy vs. acidosis  >s/p bicarb gtt  >BCx (+): E.coli ESBL CTX resistant  >continue with ertapenem 500mg qd (renal dose)    ==================CARDIAC===================  #HTN  #HLD  on home losartan 50 mg, atorvastatin  >holding home meds    ===================PULM====================  #Acidotic with respiratory compensation  increased WOB in the ED  >s/p BiPAP  >on 2L NC    ===================RENAL====================  #acute renal failure  #metabolic acidosis  #hyperkalemia  Scr 7.02, repeat 6.9  K 5.9  Likely 2/2 to complicated UTI vs. hypovolemic (decreased po intake, diarrhea iso misused miralax)    >s/p bicarb gtt  >monitor electrolytes  >if symptoms not improved, consider HD  >Nephro following    =====================GI======================  #Diet  NPO while on BiPAP    #GERD  on home dexlansoprazole   >hold home meds    ===================HEME/ONC=================  #RA  on home MTX 2.5 mg 6 tablets a week  last took on 9/2    #DVT ppx  Hep subQ q8h    ======================ID========================  #recent UTI treated with Cipro (incomplete, self-discontinued 3-4 days ago)  #Cystitis with ascending ureteritis and pyelonephritis on CT  >BCx (+): E.coli ESBL CTX resistant, UCx (+) E.coli  > s/p zosyn (9/5-9/7)  > continue with ertapenem 500mg qd (renal dose) (9/6 - )      =====================ENDO=================  #prediabetes (last A1c of 6.2)    =====================ETHICS=======================  Full Code 57yo woman, Greenlandic speaking, h/o rheumatoid arthritis on MTX, HTN, HLD, prediabetic, and GERD who presents for generalized weakness, AMS, fever/chill for 3-4 days. Recent UTI treated with Cipro outpatient 10 days ago. CT c/f cystitis with ascending ureteritis and pyelonephritis. Pt found to have hyperkalemia, acidosis, and symptoms c/f acute renal failure. Admitted to MICU for urgent HD, plan to place HD catheter today followed by dialysis.    ===================NEURO===================  AAOx0 - unknown baseline  #Lethargic, altered mental status, improving   Likely 2/2 to complicated UTI vs. uremic encephalopathy vs. acidosis  >s/p bicarb gtt  >BCx (+): E.coli ESBL CTX resistant  >continue with ertapenem 500mg qd (renal dose)    ==================CARDIAC===================  #HTN  #HLD  on home losartan 50 mg, atorvastatin  hypertension here in the hospital follows when patient has fever, continue to monitor and give acetaminophen when febrile  >holding home meds  >follow-up with family about what blood pressure is like at home    ===================PULM====================  #Acidotic with respiratory compensation, improving  increased WOB in the ED  >s/p BiPAP  >on 2L NC    ===================RENAL====================  #acute renal failure  #metabolic acidosis, resolved  #hyperkalemia, resolved  #uremic  Scr 9/8 is 6.61  BUN 9/8 increased to 143  Likely 2/2 to complicated UTI vs. hypovolemic (decreased po intake, diarrhea iso misused miralax)    >s/p bicarb gtt  >monitor electrolytes  >Nephro following, plan for HD today  >1L LR    =====================GI======================  #Diet  NPO, NGT inserted today, start tube feeds once position is confirmed    #GERD  on home dexlansoprazole   >hold home meds    ===================HEME/ONC=================  #RA  on home MTX 2.5 mg 6 tablets a week  last took on 9/2    #DVT ppx  Hep subQ q8h    ======================ID========================  #recent UTI treated with Cipro (incomplete, self-discontinued 3-4 days ago)  #Cystitis with ascending ureteritis and pyelonephritis on CT  >BCx (+): E.coli ESBL CTX resistant, UCx (+) E.coli  > s/p zosyn (9/5-9/7)  > continue with ertapenem 500mg qd (renal dose) (9/6 - )    =====================ENDO=================  #prediabetes (last A1c of 6.2)    =====================ETHICS=======================  Full Code

## 2024-09-08 NOTE — PROGRESS NOTE ADULT - SUBJECTIVE AND OBJECTIVE BOX
[facility] Division of Kidney Diseases & Hypertension  FOLLOW UP NOTE  [phone]--------------------------------------------------------------------------------    Reason for consult: LINA    24 hour events/subjective: Patient seen and examined today. Remains encephalopathic but appears more alert. Non-oliguric, but Cr and phos rising.      PAST HISTORY  --------------------------------------------------------------------------------  No significant changes to PMH, PSH, FHx, SHx, unless otherwise noted    ALLERGIES & MEDICATIONS  --------------------------------------------------------------------------------  Allergies    No Known Allergies    Intolerances      Standing Inpatient Medications  chlorhexidine 2% Cloths 1 Application(s) Topical <User Schedule>  ertapenem  IVPB 500 milliGRAM(s) IV Intermittent every 24 hours  heparin   Injectable 5000 Unit(s) SubCutaneous every 8 hours  sevelamer carbonate 800 milliGRAM(s) Oral three times a day with meals    PRN Inpatient Medications      REVIEW OF SYSTEMS  --------------------------------------------------------------------------------  Unable to obtain ROS due to patient's clinical condition.     VITALS/PHYSICAL EXAM  --------------------------------------------------------------------------------  T(C): 37.1 (09-08-24 @ 08:00), Max: 38.6 (09-08-24 @ 02:00)  HR: 92 (09-08-24 @ 09:00) (74 - 106)  BP: 190/61 (09-08-24 @ 09:00) (114/40 - 199/54)  RR: 21 (09-08-24 @ 09:00) (20 - 29)  SpO2: 100% (09-08-24 @ 09:00) (91% - 100%)  Wt(kg): --        09-07-24 @ 07:01  -  09-08-24 @ 07:00  --------------------------------------------------------  IN: 250 mL / OUT: 1065 mL / NET: -815 mL    09-08-24 @ 07:01  -  09-08-24 @ 09:29  --------------------------------------------------------  IN: 0 mL / OUT: 275 mL / NET: -275 mL      Physical Exam:                       Gen: NAD                       Pulm: CTA B/L                       CV: +S1S2                       Abd: +BS, soft, nondistended/nontender                       : jose                       Extremities: trace LE edema                       Neuro: non-focal    LABS/STUDIES  --------------------------------------------------------------------------------              8.7    9.65  >-----------<  62       [09-08-24 @ 03:10]              25.4     143  |  98  |  143  ----------------------------<  114      [09-08-24 @ 03:10]  4.7   |  20  |  6.61        Ca     8.1     [09-08-24 @ 03:10]      Mg     3.10     [09-08-24 @ 03:10]      Phos  11.5     [09-08-24 @ 03:10]    TPro  5.7  /  Alb  2.4  /  TBili  0.4  /  DBili  x   /  AST  25  /  ALT  13  /  AlkPhos  101  [09-08-24 @ 03:10]    PT/INR: PT 12.9 , INR 1.15       [09-08-24 @ 03:10]      Creatinine Trend:  SCr 6.61 [09-08 @ 03:10]  SCr 6.32 [09-07 @ 09:54]  SCr 5.99 [09-07 @ 02:20]  SCr 5.90 [09-06 @ 20:26]  SCr 6.52 [09-06 @ 06:00]

## 2024-09-08 NOTE — PROGRESS NOTE ADULT - PROBLEM SELECTOR PLAN 1
Pt with LINA 2/2 sepsis due to ESBL E. Coli bacteremia and UTI (on Ertapenem as of 9/6). Encephalopathy likely due to sepsis +/- uremia (BUN>100). Labs at [facility] in July showed normal Cr per daughter. On admission, BUN/Cr 143/7.02. CT A/p with IV Contrast (9/6) showed cystitis with pyelonephritis. Initially acidotic and hyperkalemic, resolved with bicarb gtt and now off. Cr improved to 5.9 initially on 9/6, but BUN/Cr worsened to 6.6 today (9/8). Although pt non-oliguric with UOP ~1L/24h, pt is not having good clearance, phos is rising (11.5 today). The subsequent rise in Cr on 9/7 may be due to IV contrast given on 9/5. Will hold off on any IVF or diuretics for now. Canincrease Sevelamer to 1600mg TID. Will also hold off on HD for now and monitor labs and UOP closely. If mental status does not improve, may consider HD for uremia. Discussed with MICU team.      Diana Boyd, PGY-5  Nephrology Fellow  MS TEAMS preferred  (After 4pm or on weekends, please contact the fellow on call). Pt with LINA 2/2 sepsis due to ESBL E. Coli bacteremia and UTI (on Ertapenem as of 9/6). Encephalopathy likely due to sepsis +/- uremia (BUN>100). Labs at [facility] in July showed normal Cr per daughter. On admission, BUN/Cr 143/7.02. CT A/p with IV Contrast (9/6) showed cystitis with pyelonephritis. Initially acidotic and hyperkalemic, resolved with bicarb gtt and now off. Cr improved to 5.9 initially on 9/6, but BUN/Cr worsened to 6.6 today (9/8). Although pt non-oliguric with UOP ~1L/24h, pt is not having good clearance, phos is rising (11.5 today). The subsequent rise in Cr on 9/7 may be due to IV contrast given on 9/5. Will hold off on any IVF or diuretics for now. Can increase Sevelamer to 1600mg TID. Will also hold off on HD for now and monitor labs and UOP closely. If mental status does not improve, may consider HD for uremia. Discussed with MICU team.      Diana Boyd, PGY-5  Nephrology Fellow  MS TEAMS preferred  (After 4pm or on weekends, please contact the fellow on call).

## 2024-09-09 LAB
-  AMPICILLIN/SULBACTAM: SIGNIFICANT CHANGE UP
-  AMPICILLIN: SIGNIFICANT CHANGE UP
-  AZTREONAM: SIGNIFICANT CHANGE UP
-  CEFAZOLIN: SIGNIFICANT CHANGE UP
-  CEFEPIME: SIGNIFICANT CHANGE UP
-  CEFTRIAXONE: SIGNIFICANT CHANGE UP
-  CEFUROXIME: SIGNIFICANT CHANGE UP
-  CIPROFLOXACIN: SIGNIFICANT CHANGE UP
-  ERTAPENEM: SIGNIFICANT CHANGE UP
-  GENTAMICIN: SIGNIFICANT CHANGE UP
-  IMIPENEM: SIGNIFICANT CHANGE UP
-  LEVOFLOXACIN: SIGNIFICANT CHANGE UP
-  MEROPENEM: SIGNIFICANT CHANGE UP
-  NITROFURANTOIN: SIGNIFICANT CHANGE UP
-  PIPERACILLIN/TAZOBACTAM: SIGNIFICANT CHANGE UP
-  TOBRAMYCIN: SIGNIFICANT CHANGE UP
-  TRIMETHOPRIM/SULFAMETHOXAZOLE: SIGNIFICANT CHANGE UP
ALBUMIN SERPL ELPH-MCNC: 2.6 G/DL — LOW (ref 3.3–5)
ALP SERPL-CCNC: 100 U/L — SIGNIFICANT CHANGE UP (ref 40–120)
ALT FLD-CCNC: 14 U/L — SIGNIFICANT CHANGE UP (ref 4–33)
ANION GAP SERPL CALC-SCNC: 18 MMOL/L — HIGH (ref 7–14)
APTT BLD: 23.7 SEC — LOW (ref 24.5–35.6)
AST SERPL-CCNC: 21 U/L — SIGNIFICANT CHANGE UP (ref 4–32)
BILIRUB SERPL-MCNC: 0.4 MG/DL — SIGNIFICANT CHANGE UP (ref 0.2–1.2)
BLOOD GAS ARTERIAL COMPREHENSIVE RESULT: SIGNIFICANT CHANGE UP
BUN SERPL-MCNC: 101 MG/DL — HIGH (ref 7–23)
CALCIUM SERPL-MCNC: 8.7 MG/DL — SIGNIFICANT CHANGE UP (ref 8.4–10.5)
CHLORIDE SERPL-SCNC: 101 MMOL/L — SIGNIFICANT CHANGE UP (ref 98–107)
CO2 SERPL-SCNC: 23 MMOL/L — SIGNIFICANT CHANGE UP (ref 22–31)
CREAT SERPL-MCNC: 4.48 MG/DL — HIGH (ref 0.5–1.3)
CULTURE RESULTS: ABNORMAL
EGFR: 11 ML/MIN/1.73M2 — LOW
GLUCOSE BLDC GLUCOMTR-MCNC: 170 MG/DL — HIGH (ref 70–99)
GLUCOSE SERPL-MCNC: 152 MG/DL — HIGH (ref 70–99)
HBV CORE AB SER-ACNC: SIGNIFICANT CHANGE UP
HBV CORE IGM SER-ACNC: SIGNIFICANT CHANGE UP
HBV SURFACE AG SER-ACNC: SIGNIFICANT CHANGE UP
HCT VFR BLD CALC: 24.7 % — LOW (ref 34.5–45)
HCV AB S/CO SERPL IA: 0.09 S/CO — SIGNIFICANT CHANGE UP (ref 0–0.99)
HCV AB SERPL-IMP: SIGNIFICANT CHANGE UP
HGB BLD-MCNC: 8.4 G/DL — LOW (ref 11.5–15.5)
INR BLD: 1.04 RATIO — SIGNIFICANT CHANGE UP (ref 0.85–1.18)
MAGNESIUM SERPL-MCNC: 2.7 MG/DL — HIGH (ref 1.6–2.6)
MCHC RBC-ENTMCNC: 26.2 PG — LOW (ref 27–34)
MCHC RBC-ENTMCNC: 34 GM/DL — SIGNIFICANT CHANGE UP (ref 32–36)
MCV RBC AUTO: 76.9 FL — LOW (ref 80–100)
METHOD TYPE: SIGNIFICANT CHANGE UP
MRSA PCR RESULT.: DETECTED
NRBC # BLD: 0 /100 WBCS — SIGNIFICANT CHANGE UP (ref 0–0)
NRBC # FLD: 0 K/UL — SIGNIFICANT CHANGE UP (ref 0–0)
ORGANISM # SPEC MICROSCOPIC CNT: ABNORMAL
ORGANISM # SPEC MICROSCOPIC CNT: ABNORMAL
PHOSPHATE SERPL-MCNC: 7.4 MG/DL — HIGH (ref 2.5–4.5)
PLATELET # BLD AUTO: 56 K/UL — LOW (ref 150–400)
POTASSIUM SERPL-MCNC: 4.4 MMOL/L — SIGNIFICANT CHANGE UP (ref 3.5–5.3)
POTASSIUM SERPL-SCNC: 4.4 MMOL/L — SIGNIFICANT CHANGE UP (ref 3.5–5.3)
PROT SERPL-MCNC: 5.8 G/DL — LOW (ref 6–8.3)
PROTHROM AB SERPL-ACNC: 11.6 SEC — SIGNIFICANT CHANGE UP (ref 9.5–13)
RBC # BLD: 3.21 M/UL — LOW (ref 3.8–5.2)
RBC # FLD: 15.9 % — HIGH (ref 10.3–14.5)
S AUREUS DNA NOSE QL NAA+PROBE: DETECTED
SODIUM SERPL-SCNC: 142 MMOL/L — SIGNIFICANT CHANGE UP (ref 135–145)
SPECIMEN SOURCE: SIGNIFICANT CHANGE UP
WBC # BLD: 6.87 K/UL — SIGNIFICANT CHANGE UP (ref 3.8–10.5)
WBC # FLD AUTO: 6.87 K/UL — SIGNIFICANT CHANGE UP (ref 3.8–10.5)

## 2024-09-09 PROCEDURE — 71045 X-RAY EXAM CHEST 1 VIEW: CPT | Mod: 26

## 2024-09-09 PROCEDURE — 99222 1ST HOSP IP/OBS MODERATE 55: CPT

## 2024-09-09 PROCEDURE — 99233 SBSQ HOSP IP/OBS HIGH 50: CPT | Mod: GC

## 2024-09-09 PROCEDURE — 70450 CT HEAD/BRAIN W/O DYE: CPT | Mod: 26

## 2024-09-09 PROCEDURE — 99291 CRITICAL CARE FIRST HOUR: CPT | Mod: GC

## 2024-09-09 RX ORDER — MULTI VITAMIN/MINERAL SUPPLEMENT WITH ASCORBIC ACID, NIACIN, PYRIDOXINE, PANTOTHENIC ACID, FOLIC ACID, RIBOFLAVIN, THIAMIN, BIOTIN, COBALAMIN AND ZINC. 60; 20; 12.5; 10; 10; 1.7; 1.5; 1; .3; .006 MG/1; MG/1; MG/1; MG/1; MG/1; MG/1; MG/1; MG/1; MG/1; MG/1
1 TABLET, COATED ORAL DAILY
Refills: 0 | Status: DISCONTINUED | OUTPATIENT
Start: 2024-09-09 | End: 2024-09-22

## 2024-09-09 RX ORDER — MUPIROCIN 20 MG/G
1 OINTMENT TOPICAL
Refills: 0 | Status: DISCONTINUED | OUTPATIENT
Start: 2024-09-09 | End: 2024-09-10

## 2024-09-09 RX ORDER — MUPIROCIN 20 MG/G
1 OINTMENT TOPICAL EVERY 12 HOURS
Refills: 0 | Status: DISCONTINUED | OUTPATIENT
Start: 2024-09-09 | End: 2024-09-09

## 2024-09-09 RX ORDER — ACETAMINOPHEN 325 MG
1000 TABLET ORAL ONCE
Refills: 0 | Status: COMPLETED | OUTPATIENT
Start: 2024-09-09 | End: 2024-09-09

## 2024-09-09 RX ADMIN — Medication 5000 UNIT(S): at 22:07

## 2024-09-09 RX ADMIN — MUPIROCIN 1 APPLICATION(S): 20 OINTMENT TOPICAL at 18:05

## 2024-09-09 RX ADMIN — ERTAPENEM 100 MILLIGRAM(S): 1 INJECTION, POWDER, LYOPHILIZED, FOR SOLUTION INTRAMUSCULAR; INTRAVENOUS at 16:39

## 2024-09-09 RX ADMIN — SEVELAMER CARBONATE 1600 MILLIGRAM(S): 800 TABLET, FILM COATED ORAL at 15:15

## 2024-09-09 RX ADMIN — MULTI VITAMIN/MINERAL SUPPLEMENT WITH ASCORBIC ACID, NIACIN, PYRIDOXINE, PANTOTHENIC ACID, FOLIC ACID, RIBOFLAVIN, THIAMIN, BIOTIN, COBALAMIN AND ZINC. 1 TABLET(S): 60; 20; 12.5; 10; 10; 1.7; 1.5; 1; .3; .006 TABLET, COATED ORAL at 15:57

## 2024-09-09 RX ADMIN — CHLORHEXIDINE GLUCONATE ORAL RINSE 1 APPLICATION(S): 1.2 SOLUTION DENTAL at 06:07

## 2024-09-09 RX ADMIN — Medication 400 MILLIGRAM(S): at 03:24

## 2024-09-09 RX ADMIN — Medication 5000 UNIT(S): at 15:14

## 2024-09-09 RX ADMIN — Medication 5000 UNIT(S): at 06:07

## 2024-09-09 NOTE — CONSULT NOTE ADULT - ASSESSMENT
Ms. Aggie Call is a 57yo woman, Chinese speaking, h/o rheumatoid arthritis on hydroxychloroquine and MTX, HTN, HLD, and GERD who presents for generalized weakness, AMS, fever/chill for 3-4 days admitted for sepsis secondary to pyelonephritis complicated by E. coli ESBL bacteremia.       Ms. Aggie Call is a 57yo woman, Kiswahili speaking, h/o rheumatoid arthritis on hydroxychloroquine and MTX, HTN, HLD, and GERD who presents for generalized weakness, AMS, fever/chill for 3-4 days admitted for sepsis secondary to pyelonephritis complicated by E. coli ESBL bacteremia.    #Pyelonephritis  #E coli ESBL Bacteremia   #Acute renal failure  #Encephalopathy     Presented with fever to 101, found to have positive UA, culture with E coli ESBL with 4/4 positive blood cultures   Started on zosyn on 9/5, switched to ertapenem on 9/6   Course complicated by MICU admission for acute renal failure requiring HD for uremia (BUN > 140)   Most recent BC 9/7 NGTD   Overall, patient appears to be clinically improving on ertapenem. No other apparent infectious sources, such as CNS      Plan:  - continue ertapenem 500mg q24 hours   - monitor fever curve and WBC trend   - monitor for improvement of encephalopathy with subsequent HD and treatment of infection

## 2024-09-09 NOTE — DIETITIAN INITIAL EVALUATION ADULT - PERTINENT LABORATORY DATA
09-09    142  |  101  |  101<H>  ----------------------------<  152<H>  4.4   |  23  |  4.48<H>    Ca    8.7      09 Sep 2024 01:45  Phos  7.4     09-09  Mg     2.70     09-09    Hemoglobin: 8.4 g/dL (09.09.24 @ 01:45)  Hematocrit: 24.7 (9/9/24)  Mean Cell Volume: 76.9 fL (09.09.24 @ 01:45)    POCT Blood Glucose.: 170 mg/dL (09-09-24 @ 01:48)

## 2024-09-09 NOTE — PROGRESS NOTE ADULT - ATTENDING COMMENTS
Patient examined and case reviewed in detail on bedside rounds  Critically ill and unstable on HD with hyperkalemia/acidosis/urosepsis  Frequent bedside visits with therapy change today.   I have personally and independently provided 35+ minutes of critical care services; this excludes time spent on separate procedures or teaching

## 2024-09-09 NOTE — PROGRESS NOTE ADULT - PROBLEM SELECTOR PLAN 1
Pt with LINA 2/2 sepsis due to ESBL E. Coli bacteremia and UTI (on Ertapenem as of 9/6). Encephalopathy likely due to sepsis +/- uremia (BUN>100). Labs at PCP in July showed normal Cr per daughter. On admission, BUN/Cr 143/7.02. CT A/p with IV Contrast (9/6) showed cystitis with pyelonephritis. Initially acidotic and hyperkalemic, resolved with bicarb gtt and now off. Cr improved to 5.9 initially on 9/6, but BUN/Cr worsened to 6.6 (9/8). Although pt non-oliguric with UOP ~1.1L/24h, pt is not having good clearance. The subsequent rise in Cr on 9/7 may be due to IV contrast given on 9/5. Discussed with the patient's family that she will require HD, risks and benefits associated with HD explained at length. On 9/9, BUN/Cr levels 101/4.4. HD consent obtained and kept in patients chart. Pt received 1st session of HD on 9/8 and plan for 2nd session on 9/9.  Will hold off on any IVF or diuretics for now. Can increase Sevelamer to 1600mg TID.     Doug Anderson, PGY4  Nephrology Fellow  MS TEAMS preferred  (After 4pm or on weekends, please contact the fellow on call). Pt. with LINA in setting of UTI/sepsis/bacteremia and recent IV contrast use. Pt. with likely ATN. Pt. initiated on HD yesterday (9/8/24) for altered mental status and concern for uremia. Pt. non-oliguric. Plan for second HD treatment today. Monitor labs and urine output. Avoid any potential nephrotoxins. Dose medications as per eGFR.     Doug Anderson, PGY4  Nephrology Fellow  MS TEAMS preferred  (After 4pm or on weekends, please contact the fellow on call).

## 2024-09-09 NOTE — PROCEDURE NOTE - NSPOSTPRCRAD_GEN_A_CORE
feeding tube in correct gastric position/post-procedure radiography performed
post-procedure radiography performed

## 2024-09-09 NOTE — DIETITIAN INITIAL EVALUATION ADULT - PERTINENT MEDS FT
MEDICATIONS  (STANDING):  chlorhexidine 2% Cloths 1 Application(s) Topical <User Schedule>  ertapenem  IVPB 500 milliGRAM(s) IV Intermittent every 24 hours  heparin   Injectable 5000 Unit(s) SubCutaneous every 8 hours  sevelamer carbonate 1600 milliGRAM(s) Oral three times a day with meals    MEDICATIONS  (PRN):  sodium chloride 0.9% lock flush 10 milliLiter(s) IV Push every 1 hour PRN Pre/post blood products, medications, blood draw, and to maintain line patency

## 2024-09-09 NOTE — PROGRESS NOTE ADULT - ATTENDING COMMENTS
Pt. with LINA, likely ATN. Pt. initiated on HD yesterday. Assessment and plan for LINA/HD as outlined above. Plan for HD today. Monitor labs and urine output. Avoid any potential nephrotoxins. Dose medications as per eGFR.

## 2024-09-09 NOTE — DIETITIAN INITIAL EVALUATION ADULT - REASON FOR ADMISSION
57 y/o Bulgarian speaking F admitted for LINA with hyperkalemia in setting of UTI/sepsis/bacteremia and recent IV contrast use.  Admitted to MICU for urgent HD.  Initiated on HD (9/8).

## 2024-09-09 NOTE — CONSULT NOTE ADULT - SUBJECTIVE AND OBJECTIVE BOX
Patient is a 58y old  Female who presents with a chief complaint of 59 y/o Occitan speaking F admitted for LINA with hyperkalemia in setting of UTI/sepsis/bacteremia and recent IV contrast use.  Admitted to MICU for urgent HD.  Initiated on HD (9/8).     (09 Sep 2024 13:33)    HPI:  Ms. Aggie Call is a 59yo woman, Occitan speaking, h/o rheumatoid arthritis on hydroxychloroquine and MTX, HTN, HLD, and GERD who presents for generalized weakness, AMS, fever/chill for 3-4 days. Per son, patient was given Cipro by PCP for symptoms c/f UTI about 10 days ago, after which she has been becoming more confused, lethargic, throat pain with worsening PO intake and self-discontinued medications for 3 days.    In the ED, pt was found to be febrile, tachypneic, /107, and increased WOB. Labs notable for K 6.9 (hemolyzed), repeat 5.9 with no significant change on EKG, Scr 6.86, VBG 7.23/30/35/12, repeat VBG 7.16/43/33/13. CT showed Cystitis with ascending ureteritis and pyelonephritis. Pt was given Lokelma, Ca-gluconate, and insulin/detrose for hyperkalemia, LR and Zosyn in the setting of UTI, and placed on bicarb ggt for acidosis.     Pt was admitted to MICU for possible urgent HD and continued monitoring given worsening acidosis on repeat vbg.   (06 Sep 2024 01:47)  Urine culture and blood cultures x2 positive for E. coli ESBL. Changed from zosyn to ertapenem. Fever curve improving (Tmax 101 --> 100.2 on rectal probe), however, course complicated by continuing altered mental status. Thought to be secondary to worsening uremia (BUN 140s), underwent HD x2 on 9/8 and 9/9. Most recent blood cultures on 9/7 NGTD.     Upon assessment today, patient is AOx1 and Occitan  called. Reports pain which she localized initially to the head and then "all over". Denies fevers, chills, nausea/vomiting. Reports that she is currently in a house and asked to call her son for any information.         REVIEW OF SYSTEMS  [ x ] ROS unobtainable because:  altered mentation     prior hospital charts reviewed [V]  primary team notes reviewed [V]  other consultant notes reviewed [V]    PAST MEDICAL & SURGICAL HISTORY:  HTN (hypertension)          SOCIAL HISTORY:  - Denied smoking/vaping/alcohol/recreational drug use    FAMILY HISTORY:      Allergies  No Known Allergies        ANTIMICROBIALS:  ertapenem  IVPB 500 every 24 hours      ANTIMICROBIALS (past 90 days):  MEDICATIONS  (STANDING):  ertapenem  IVPB   100 mL/Hr IV Intermittent (09-08-24 @ 17:22)   100 mL/Hr IV Intermittent (09-07-24 @ 16:00)   100 mL/Hr IV Intermittent (09-06-24 @ 16:35)    piperacillin/tazobactam IVPB.   200 mL/Hr IV Intermittent (09-06-24 @ 03:47)    piperacillin/tazobactam IVPB.-   25 mL/Hr IV Intermittent (09-06-24 @ 12:45)    piperacillin/tazobactam IVPB...   200 mL/Hr IV Intermittent (09-05-24 @ 20:55)        OTHER MEDS:   MEDICATIONS  (STANDING):  heparin   Injectable 5000 every 8 hours      VITALS:  Vital Signs Last 24 Hrs  T(F): 98.6 (09-09-24 @ 14:25), Max: 103 (09-07-24 @ 02:27)    Vital Signs Last 24 Hrs  HR: 100 (09-09-24 @ 15:00) (76 - 110)  BP: 138/56 (09-09-24 @ 15:00) (95/68 - 189/77)  RR: 24 (09-09-24 @ 15:00)  SpO2: 100% (09-09-24 @ 15:00) (93% - 100%)  Wt(kg): --    EXAM:    GA: NAD, AOx1  HEENT: +dried blood with dry mucosa   CV: nl S1/S2, +systolic ejection murmur in aortic region   Lungs: Mild crackles in the right base   Abd: BS+, soft, nontender, no rebounding pain  Ext: 1+ pitting edema to the knee   Neuro: QUACH   IV: no phlebitis (2 peripheral IVs)     Labs:                        8.4    6.87  )-----------( 56       ( 09 Sep 2024 01:45 )             24.7     09-09    142  |  101  |  101<H>  ----------------------------<  152<H>  4.4   |  23  |  4.48<H>    Ca    8.7      09 Sep 2024 01:45  Phos  7.4     09-09  Mg     2.70     09-09    TPro  5.8<L>  /  Alb  2.6<L>  /  TBili  0.4  /  DBili  x   /  AST  21  /  ALT  14  /  AlkPhos  100  09-09      WBC Trend:  WBC Count: 6.87 (09-09-24 @ 01:45)  WBC Count: 7.11 (09-08-24 @ 14:16)  WBC Count: 9.65 (09-08-24 @ 03:10)  WBC Count: 10.07 (09-07-24 @ 09:54)      Auto Neutrophil #: 9.17 K/uL (09-06-24 @ 06:00)  Band Neutrophils %: 0.9 % (09-06-24 @ 06:00)  Auto Neutrophil #: 7.98 K/uL (09-05-24 @ 20:45)      Creatine Trend:  Creatinine: 4.48 (09-09)  Creatinine: 6.55 (09-08)  Creatinine: 6.61 (09-08)  Creatinine: 6.32 (09-07)      Liver Biochemical Testing Trend:  Alanine Aminotransferase (ALT/SGPT): 14 (09-09)  Alanine Aminotransferase (ALT/SGPT): 13 (09-08)  Alanine Aminotransferase (ALT/SGPT): 13 (09-08)  Alanine Aminotransferase (ALT/SGPT): 18 (09-07)  Alanine Aminotransferase (ALT/SGPT): 16 (09-07)  Aspartate Aminotransferase (AST/SGOT): 21 (09-09-24 @ 01:45)  Aspartate Aminotransferase (AST/SGOT): 23 (09-08-24 @ 14:16)  Aspartate Aminotransferase (AST/SGOT): 25 (09-08-24 @ 03:10)  Aspartate Aminotransferase (AST/SGOT): 27 (09-07-24 @ 09:54)  Aspartate Aminotransferase (AST/SGOT): 26 (09-07-24 @ 02:20)  Bilirubin Total: 0.4 (09-09)  Bilirubin Total: 0.5 (09-08)  Bilirubin Total: 0.4 (09-08)  Bilirubin Total: 0.6 (09-07)  Bilirubin Total: 0.6 (09-07)      Trend LDH          Urinalysis Basic - ( 09 Sep 2024 01:45 )    Color: x / Appearance: x / SG: x / pH: x  Gluc: 152 mg/dL / Ketone: x  / Bili: x / Urobili: x   Blood: x / Protein: x / Nitrite: x   Leuk Esterase: x / RBC: x / WBC x   Sq Epi: x / Non Sq Epi: x / Bacteria: x        MICROBIOLOGY:    MRSA PCR Result.: Detected (09-08-24 @ 19:49)      Culture - Blood (collected 07 Sep 2024 14:33)  Source: .Blood Blood  Preliminary Report:    No growth at 24 hours    Culture - Urine (collected 05 Sep 2024 22:30)  Source: Catheterized  Preliminary Report:    >100,000 CFU/ml Escherichia coli    Urinalysis with Rflx Culture (collected 05 Sep 2024 22:30)    Culture - Blood (collected 05 Sep 2024 22:07)  Source: .Blood Blood-Peripheral  Final Report:    Growth in aerobic and anaerobic bottles: Escherichia coli ESBL    See previous culture [id number]    Culture - Blood (collected 05 Sep 2024 20:46)  Source: .Blood Blood-Peripheral  Final Report:    Growth in aerobic and anaerobic bottles: Escherichia coli ESBL    Direct identification is available within approximately 3-5    hours either by Blood Panel Multiplexed PCR or Direct    MALDI-TOF. Details: [email or web address]  Organism: Blood Culture PCR  Escherichia coli ESBL  Organism: Escherichia coli ESBL    Sensitivities:      Method Type: NEFTALI      -  Ampicillin: R >16 These ampicillin results predict results for amoxicillin      -  Ampicillin/Sulbactam: R 16/8      -  Aztreonam: R >16      -  Cefazolin: R >16      -  Cefepime: R >16      -  Ceftriaxone: R >32      -  Ciprofloxacin: R >2      -  Ertapenem: S <=0.5      -  Gentamicin: S <=2      -  Imipenem: S <=1      -  Levofloxacin: R >4      -  Meropenem: S <=1      -  Piperacillin/Tazobactam: R <=8      -  Tobramycin: R >8      -  Trimethoprim/Sulfamethoxazole: S <=0.5/9.5  Organism: Blood Culture PCR    Sensitivities:      Method Type: PCR      -  Escherichia coli: Detec      -  ESBL: Detec      -  CTX-M Resistance Marker: Detec            Troponin T, High Sensitivity Result: 13 (09-05)    Blood Gas Arterial, Lactate: 1.2 (09-09 @ 01:45)  Blood Gas Venous - Lactate: 1.0 (09-08 @ 03:10)  Blood Gas Venous - Lactate: 1.2 (09-07 @ 14:45)  Blood Gas Venous - Lactate: 1.8 (09-06 @ 20:26)        CSF:          RADIOLOGY:  imaging below personally reviewed     Patient is a 58y old  Female who presents with a chief complaint of 57 y/o Swedish speaking F admitted for LINA with hyperkalemia in setting of UTI/sepsis/bacteremia and recent IV contrast use.  Admitted to MICU for urgent HD.  Initiated on HD (9/8).     (09 Sep 2024 13:33)    HPI:  Ms. Aggie Call is a 59yo woman, Swedish speaking, h/o rheumatoid arthritis on hydroxychloroquine and MTX, HTN, HLD, and GERD who presents for generalized weakness, AMS, fever/chill for 3-4 days. Per son, patient was given Cipro by PCP for symptoms c/f UTI about 10 days ago, after which she has been becoming more confused, lethargic, throat pain with worsening PO intake and self-discontinued medications for 3 days.    In the ED, pt was found to be febrile, tachypneic, /107, and increased WOB. Labs notable for K 6.9 (hemolyzed), repeat 5.9 with no significant change on EKG, Scr 6.86, VBG 7.23/30/35/12, repeat VBG 7.16/43/33/13. CT showed Cystitis with ascending ureteritis and pyelonephritis. Pt was given Lokelma, Ca-gluconate, and insulin/detrose for hyperkalemia, LR and Zosyn in the setting of UTI, and placed on bicarb ggt for acidosis.     Pt was admitted to MICU for possible urgent HD and continued monitoring given worsening acidosis on repeat vbg.   (06 Sep 2024 01:47)  Urine culture and blood cultures x2 positive for E. coli ESBL. Changed from zosyn to ertapenem. Fever curve improving (Tmax 101 --> 100.2 on rectal probe), however, course complicated by continuing altered mental status. Thought to be secondary to worsening uremia (BUN 140s), underwent HD x2 on 9/8 and 9/9. Most recent blood cultures on 9/7 NGTD.     Upon assessment today, patient is AOx1 and Swedish  called. Reports pain which she localized initially to the head and then "all over". Denies fevers, chills, nausea/vomiting. Reports that she is currently in a house and asked to call her son for any information.         REVIEW OF SYSTEMS  [ x ] ROS unobtainable because:  altered mentation     prior hospital charts reviewed [V]  primary team notes reviewed [V]  other consultant notes reviewed [V]    PAST MEDICAL & SURGICAL HISTORY:  HTN (hypertension)          SOCIAL HISTORY:  - Denied smoking/vaping/alcohol/recreational drug use    FAMILY HISTORY:      Allergies  No Known Allergies        ANTIMICROBIALS:  ertapenem  IVPB 500 every 24 hours      ANTIMICROBIALS (past 90 days):  MEDICATIONS  (STANDING):  ertapenem  IVPB   100 mL/Hr IV Intermittent (09-08-24 @ 17:22)   100 mL/Hr IV Intermittent (09-07-24 @ 16:00)   100 mL/Hr IV Intermittent (09-06-24 @ 16:35)    piperacillin/tazobactam IVPB.   200 mL/Hr IV Intermittent (09-06-24 @ 03:47)    piperacillin/tazobactam IVPB.-   25 mL/Hr IV Intermittent (09-06-24 @ 12:45)    piperacillin/tazobactam IVPB...   200 mL/Hr IV Intermittent (09-05-24 @ 20:55)        OTHER MEDS:   MEDICATIONS  (STANDING):  heparin   Injectable 5000 every 8 hours      VITALS:  Vital Signs Last 24 Hrs  T(F): 98.6 (09-09-24 @ 14:25), Max: 103 (09-07-24 @ 02:27)    Vital Signs Last 24 Hrs  HR: 100 (09-09-24 @ 15:00) (76 - 110)  BP: 138/56 (09-09-24 @ 15:00) (95/68 - 189/77)  RR: 24 (09-09-24 @ 15:00)  SpO2: 100% (09-09-24 @ 15:00) (93% - 100%)  Wt(kg): --    EXAM:    GA: NAD, AOx1  HEENT: +dried blood with dry mucosa, oral lesion   CV: nl S1/S2, +systolic ejection murmur in aortic region   Lungs: Mild crackles in the right base   Abd: BS+, soft, nontender, no rebounding pain  Ext: 1+ pitting edema to the knee   Neuro: QUACH   IV: no phlebitis (2 peripheral IVs)     Labs:                        8.4    6.87  )-----------( 56       ( 09 Sep 2024 01:45 )             24.7     09-09    142  |  101  |  101<H>  ----------------------------<  152<H>  4.4   |  23  |  4.48<H>    Ca    8.7      09 Sep 2024 01:45  Phos  7.4     09-09  Mg     2.70     09-09    TPro  5.8<L>  /  Alb  2.6<L>  /  TBili  0.4  /  DBili  x   /  AST  21  /  ALT  14  /  AlkPhos  100  09-09      WBC Trend:  WBC Count: 6.87 (09-09-24 @ 01:45)  WBC Count: 7.11 (09-08-24 @ 14:16)  WBC Count: 9.65 (09-08-24 @ 03:10)  WBC Count: 10.07 (09-07-24 @ 09:54)      Auto Neutrophil #: 9.17 K/uL (09-06-24 @ 06:00)  Band Neutrophils %: 0.9 % (09-06-24 @ 06:00)  Auto Neutrophil #: 7.98 K/uL (09-05-24 @ 20:45)      Creatine Trend:  Creatinine: 4.48 (09-09)  Creatinine: 6.55 (09-08)  Creatinine: 6.61 (09-08)  Creatinine: 6.32 (09-07)      Liver Biochemical Testing Trend:  Alanine Aminotransferase (ALT/SGPT): 14 (09-09)  Alanine Aminotransferase (ALT/SGPT): 13 (09-08)  Alanine Aminotransferase (ALT/SGPT): 13 (09-08)  Alanine Aminotransferase (ALT/SGPT): 18 (09-07)  Alanine Aminotransferase (ALT/SGPT): 16 (09-07)  Aspartate Aminotransferase (AST/SGOT): 21 (09-09-24 @ 01:45)  Aspartate Aminotransferase (AST/SGOT): 23 (09-08-24 @ 14:16)  Aspartate Aminotransferase (AST/SGOT): 25 (09-08-24 @ 03:10)  Aspartate Aminotransferase (AST/SGOT): 27 (09-07-24 @ 09:54)  Aspartate Aminotransferase (AST/SGOT): 26 (09-07-24 @ 02:20)  Bilirubin Total: 0.4 (09-09)  Bilirubin Total: 0.5 (09-08)  Bilirubin Total: 0.4 (09-08)  Bilirubin Total: 0.6 (09-07)  Bilirubin Total: 0.6 (09-07)      Trend LDH          Urinalysis Basic - ( 09 Sep 2024 01:45 )    Color: x / Appearance: x / SG: x / pH: x  Gluc: 152 mg/dL / Ketone: x  / Bili: x / Urobili: x   Blood: x / Protein: x / Nitrite: x   Leuk Esterase: x / RBC: x / WBC x   Sq Epi: x / Non Sq Epi: x / Bacteria: x        MICROBIOLOGY:    MRSA PCR Result.: Detected (09-08-24 @ 19:49)      Culture - Blood (collected 07 Sep 2024 14:33)  Source: .Blood Blood  Preliminary Report:    No growth at 24 hours    Culture - Urine (collected 05 Sep 2024 22:30)  Source: Catheterized  Preliminary Report:    >100,000 CFU/ml Escherichia coli    Urinalysis with Rflx Culture (collected 05 Sep 2024 22:30)    Culture - Blood (collected 05 Sep 2024 22:07)  Source: .Blood Blood-Peripheral  Final Report:    Growth in aerobic and anaerobic bottles: Escherichia coli ESBL    See previous culture [id number]    Culture - Blood (collected 05 Sep 2024 20:46)  Source: .Blood Blood-Peripheral  Final Report:    Growth in aerobic and anaerobic bottles: Escherichia coli ESBL    Direct identification is available within approximately 3-5    hours either by Blood Panel Multiplexed PCR or Direct    MALDI-TOF. Details: [email or web address]  Organism: Blood Culture PCR  Escherichia coli ESBL  Organism: Escherichia coli ESBL    Sensitivities:      Method Type: NEFTALI      -  Ampicillin: R >16 These ampicillin results predict results for amoxicillin      -  Ampicillin/Sulbactam: R 16/8      -  Aztreonam: R >16      -  Cefazolin: R >16      -  Cefepime: R >16      -  Ceftriaxone: R >32      -  Ciprofloxacin: R >2      -  Ertapenem: S <=0.5      -  Gentamicin: S <=2      -  Imipenem: S <=1      -  Levofloxacin: R >4      -  Meropenem: S <=1      -  Piperacillin/Tazobactam: R <=8      -  Tobramycin: R >8      -  Trimethoprim/Sulfamethoxazole: S <=0.5/9.5  Organism: Blood Culture PCR    Sensitivities:      Method Type: PCR      -  Escherichia coli: Detec      -  ESBL: Detec      -  CTX-M Resistance Marker: Detec            Troponin T, High Sensitivity Result: 13 (09-05)    Blood Gas Arterial, Lactate: 1.2 (09-09 @ 01:45)  Blood Gas Venous - Lactate: 1.0 (09-08 @ 03:10)  Blood Gas Venous - Lactate: 1.2 (09-07 @ 14:45)  Blood Gas Venous - Lactate: 1.8 (09-06 @ 20:26)        CSF:          RADIOLOGY:  imaging below personally reviewed

## 2024-09-09 NOTE — DIETITIAN INITIAL EVALUATION ADULT - NS FNS DIET ORDER
Diet, NPO with Tube Feed:   Tube Feeding Modality: Nasogastric  Nepro with Carb Steady (NEPRORTH)  Total Volume for 24 Hours (mL): 1680  Continuous  Starting Tube Feed Rate {mL per Hour}: 10  Increase Tube Feed Rate by (mL): 10     Every 4 hours  Until Goal Tube Feed Rate (mL per Hour): 70  Tube Feed Start Time: 11:00  Tube Feed Stop Time: 05:00 (09-08-24 @ 13:32) [Active]      provides:  1260mL total volume  2268 Kcals  102g protein  916mL free water

## 2024-09-09 NOTE — DIETITIAN INITIAL EVALUATION ADULT - ENTERAL
---- Decrease enteral goal rate of Nepro to.... ---- Decrease enteral goal rate of Nepro to 50mL/hr x 18hrs (11a-5a) to provide:    provides:  900mL total volume  1620 Kcals  73g protein  657mL free water

## 2024-09-09 NOTE — DIETITIAN INITIAL EVALUATION ADULT - [id number]_GEN_A_CORE
09-08-24 @ 07:01  -  09-09-24 @ 07:00  --------------------------------------------------------  OUT:  Total OUT: 0 mL    Total NET: 200 mL    No vomiting or abdominal distention.  Loose (diarrhea?) bowel movement (9/9).

## 2024-09-09 NOTE — DIETITIAN INITIAL EVALUATION ADULT - ADD RECOMMEND
---- Monitor tolerance to TF, GI status, electrolytes, glucose  ---- Monitor tolerance to TF, GI status, electrolytes, glucose   ---- Order Nephro-johny for micronutrient support

## 2024-09-09 NOTE — PROGRESS NOTE ADULT - ASSESSMENT
57yo woman, Occitan speaking, h/o rheumatoid arthritis on MTX, HTN, HLD, prediabetic, and GERD who presents for generalized weakness, AMS, fever/chill for 3-4 days. Recent UTI treated with Cipro outpatient 10 days ago. CT c/f cystitis with ascending ureteritis and pyelonephritis. Pt found to have hyperkalemia, acidosis, and symptoms c/f acute renal failure. Admitted to MICU for urgent HD, plan to place HD catheter today followed by dialysis.    ===================NEURO===================  AAOx0 - unknown baseline  #Lethargic, altered mental status, improving   Likely 2/2 to complicated UTI vs. uremic encephalopathy vs. acidosis  >s/p bicarb gtt  >BCx (+): E.coli ESBL CTX resistant  >continue with ertapenem 500mg qd (renal dose)    ==================CARDIAC===================  #HTN  #HLD  on home losartan 50 mg, atorvastatin  hypertension here in the hospital follows when patient has fever, continue to monitor and give acetaminophen when febrile  >holding home meds  >follow-up with family about what blood pressure is like at home    ===================PULM====================  #Acidotic with respiratory compensation, improving  increased WOB in the ED  >s/p BiPAP  >on 2L NC    ===================RENAL====================  #acute renal failure  #metabolic acidosis, resolved  #hyperkalemia, resolved  #uremic  Scr 9/8 is 6.61  BUN 9/8 increased to 143  Likely 2/2 to complicated UTI vs. hypovolemic (decreased po intake, diarrhea iso misused miralax)    >s/p bicarb gtt  >monitor electrolytes  >Nephro following, plan for HD today  >1L LR    =====================GI======================  #Diet  NPO, NGT inserted today, start tube feeds once position is confirmed    #GERD  on home dexlansoprazole   >hold home meds    ===================HEME/ONC=================  #RA  on home MTX 2.5 mg 6 tablets a week  last took on 9/2    #DVT ppx  Hep subQ q8h    ======================ID========================  #recent UTI treated with Cipro (incomplete, self-discontinued 3-4 days ago)  #Cystitis with ascending ureteritis and pyelonephritis on CT  >BCx (+): E.coli ESBL CTX resistant, UCx (+) E.coli  > s/p zosyn (9/5-9/7)  > continue with ertapenem 500mg qd (renal dose) (9/6 - )    =====================ENDO=================  #prediabetes (last A1c of 6.2)    =====================ETHICS=======================  Full Code 57yo woman, Turkish speaking, h/o rheumatoid arthritis on MTX, HTN, HLD, prediabetic, and GERD who presents for generalized weakness, AMS, fever/chill for 3-4 days. Recent UTI treated with Cipro outpatient 10 days ago. CT c/f cystitis with ascending ureteritis and pyelonephritis. Pt found to have hyperkalemia, acidosis, and symptoms c/f acute renal failure. Admitted to MICU for urgent HD, plan to place HD catheter today followed by dialysis.    ===================NEURO===================  AAOx0 - unknown baseline  #Lethargic, altered mental status, improving   Likely 2/2 to complicated UTI vs. uremic encephalopathy vs. acidosis  >s/p bicarb gtt  >BCx (+): E.coli ESBL CTX resistant  >continue with ertapenem 500mg qd (renal dose)  > CT Head wo contrast for mental status not improving despite dialysis?    ==================CARDIAC===================  #HTN  #HLD  on home losartan 50 mg, atorvastatin  hypertension here in the hospital follows when patient has fever, continue to monitor and give acetaminophen when febrile  >holding home meds  >follow-up with family about what blood pressure is like at home    ===================PULM====================  #Acidotic with respiratory compensation, improving  increased WOB in the ED  >s/p BiPAP  >on 2L NC    ===================RENAL====================  #acute renal failure  #metabolic acidosis, resolved  #hyperkalemia, resolved  #uremic  Scr 9/8 is 6.61  BUN 9/8 increased to 143   Likely 2/2 to complicated UTI vs. hypovolemic (decreased po intake, diarrhea iso misused miralax)    >s/p bicarb gtt  >monitor electrolytes  >Nephro following, HD 9/8 and 9/9  >1L LR  > s/p HD overnight, labs improving but still altered mentation    =====================GI======================  #Diet  NPO, NGT inserted today, start tube feeds once position is confirmed    #GERD  on home dexlansoprazole   >hold home meds    ===================HEME/ONC=================  #RA  on home MTX 2.5 mg 6 tablets a week  last took on 9/2    #DVT ppx  Hep subQ q8h    ======================ID========================  #recent UTI treated with Cipro (incomplete, self-discontinued 3-4 days ago)  #Cystitis with ascending ureteritis and pyelonephritis on CT  >BCx (+): E.coli ESBL CTX resistant, UCx (+) E.coli  > s/p zosyn (9/5-9/7)  > continue with ertapenem 500mg qd (renal dose) (9/6 - )    =====================ENDO=================  #prediabetes (last A1c of 6.2)    =====================ETHICS=======================  Full Code 59yo woman, Nepali speaking, h/o rheumatoid arthritis on MTX, HTN, HLD, prediabetic, and GERD who presents for generalized weakness, AMS, fever/chill for 3-4 days. Recent UTI treated with Cipro outpatient 10 days ago. CT c/f cystitis with ascending ureteritis and pyelonephritis. Pt found to have hyperkalemia, acidosis, and symptoms c/f acute renal failure. Admitted to MICU for urgent HD, plan to place HD catheter today followed by dialysis.    ===================NEURO===================  AAOx0 - unknown baseline  #Lethargic, altered mental status, improving   Likely 2/2 to complicated UTI vs. uremic encephalopathy vs. acidosis  >s/p bicarb gtt  >BCx (+): E.coli ESBL CTX resistant  >continue with ertapenem 500mg qd (renal dose)  > CT Head wo contrast for mental status not improving despite dialysis?    ==================CARDIAC===================  #HTN  #HLD  on home losartan 50 mg, atorvastatin  hypertension here in the hospital follows when patient has fever, continue to monitor and give acetaminophen when febrile  >holding home meds  >follow-up with family about what blood pressure is like at home    ===================PULM====================  #Acidotic with respiratory compensation, improving  increased WOB in the ED  >s/p BiPAP  >on 2L NC    ===================RENAL====================  #acute renal failure  #metabolic acidosis, resolved  #hyperkalemia, resolved  #uremic  Scr 9/8 is 6.61  BUN 9/8 increased to 143 > 101 (9/9)  Likely 2/2 to complicated UTI vs. hypovolemic (decreased po intake, diarrhea iso misused miralax)    >s/p bicarb gtt  >monitor electrolytes  >Nephro following, HD 9/8 and 9/9  >1L LR  > s/p HD overnight, labs improving but still altered mentation    =====================GI======================  #Diet  NPO, NGT inserted today, start tube feeds once position is confirmed    #GERD  on home dexlansoprazole   >hold home meds    ===================HEME/ONC=================  #RA  on home MTX 2.5 mg 6 tablets a week  last took on 9/2    #DVT ppx  Hep subQ q8h    ======================ID========================  #recent UTI treated with Cipro (incomplete, self-discontinued 3-4 days ago)  #Cystitis with ascending ureteritis and pyelonephritis on CT  >BCx (+): E.coli ESBL CTX resistant, UCx (+) E.coli  > s/p zosyn (9/5-9/7)  > continue with ertapenem 500mg qd (renal dose) (9/6 - )    =====================ENDO=================  #prediabetes (last A1c of 6.2)    =====================ETHICS=======================  Full Code 57yo woman, Mongolian speaking, h/o rheumatoid arthritis on MTX, HTN, HLD, prediabetic, and GERD who presents for generalized weakness, AMS, fever/chill for 3-4 days. Recent UTI treated with Cipro outpatient 10 days ago. CT c/f cystitis with ascending ureteritis and pyelonephritis. Pt found to have hyperkalemia, acidosis, and symptoms c/f acute renal failure. Admitted to MICU for urgent HD, had dialysis on 9/8 and 9/9, monitoring for improvement in labs and mental status.    ===================NEURO===================  AAOx0 - unknown baseline  #Lethargic, altered mental status, improving   Likely 2/2 to complicated UTI vs. uremic encephalopathy vs. acidosis  >s/p bicarb gtt  >BCx (+): E.coli ESBL CTX resistant  >continue with ertapenem 500mg qd (renal dose)  > CT Head wo contrast for mental status not improving despite dialysis?  > ID consulted    ==================CARDIAC===================  #HTN  #HLD  on home losartan 50 mg, atorvastatin  hypertension here in the hospital follows when patient has fever, continue to monitor and give acetaminophen when febrile  >holding home meds  >follow-up with family about what blood pressure is like at home    ===================PULM====================  #Acidotic with respiratory compensation, improving  increased WOB in the ED  >s/p BiPAP  >on 2L NC    ===================RENAL====================  #acute renal failure  #metabolic acidosis, resolved  #hyperkalemia, resolved  #uremic  Scr 9/8 is 6.61  BUN improving 143 > 101 (9/9)  Likely 2/2 to complicated UTI vs. hypovolemic (decreased po intake, diarrhea iso misused miralax)    >s/p bicarb gtt  >monitor electrolytes  >Nephro following, HD 9/8 and 9/9  >1L LR  > s/p HD overnight, labs improving but still altered mentation  > euvolemic, net 0 removal over dialysis today (9/9)    =====================GI======================  #Diet  NPO, NGT inserted today, start tube and nephro-vit  Iron studies per nutrition recs    #GERD  on home dexlansoprazole   >hold home meds    ===================HEME/ONC=================  #RA  on home MTX 2.5 mg 6 tablets a week  last took on 9/2    #DVT ppx  Hep subQ q8h    ======================ID========================  #recent UTI treated with Cipro (incomplete, self-discontinued 3-4 days ago)  #Cystitis with ascending ureteritis and pyelonephritis on CT  >BCx (+): E.coli ESBL CTX resistant, UCx (+) E.coli  > s/p zosyn (9/5-9/7)  > continue with ertapenem 500mg qd (renal dose) (9/6 - )  > ID consulted for continued altered mental status  =====================ENDO=================  #prediabetes (last A1c of 6.2)    =====================ETHICS=======================  Full Code

## 2024-09-09 NOTE — PROGRESS NOTE ADULT - SUBJECTIVE AND OBJECTIVE BOX
[facility] DIVISION OF KIDNEY DISEASES AND HYPERTENSION   FOLLOW UP NOTE    --------------------------------------------------------------------------------  Chief Complaint: LINA    24 hour events/subjective: Pt seen at bedside, alert this morning, but could not obtain any hx from patient. Pt s/p 1st session of HD yesterday.       PAST HISTORY  --------------------------------------------------------------------------------  No significant changes to PMH, PSH, FHx, SHx, unless otherwise noted    ALLERGIES & MEDICATIONS  --------------------------------------------------------------------------------  Allergies    No Known Allergies    Intolerances      Standing Inpatient Medications  chlorhexidine 2% Cloths 1 Application(s) Topical <User Schedule>  chlorhexidine 4% Liquid 1 Application(s) Topical <User Schedule>  dexMEDEtomidine Infusion 0.5 MICROgram(s)/kG/Hr IV Continuous <Continuous>  ertapenem  IVPB 500 milliGRAM(s) IV Intermittent every 24 hours  heparin   Injectable 5000 Unit(s) SubCutaneous every 8 hours  sevelamer carbonate 1600 milliGRAM(s) Oral three times a day with meals    PRN Inpatient Medications  sodium chloride 0.9% lock flush 10 milliLiter(s) IV Push every 1 hour PRN      REVIEW OF SYSTEMS  --------------------------------------------------------------------------------  Could not obtain any hx from pt as she was encephalopathic     VITALS/PHYSICAL EXAM  --------------------------------------------------------------------------------  T(C): 37.9 (09-09-24 @ 04:00), Max: 38.9 (09-09-24 @ 03:00)  HR: 95 (09-09-24 @ 06:00) (76 - 110)  BP: 100/79 (09-09-24 @ 06:00) (95/68 - 170/81)  RR: 24 (09-09-24 @ 06:00) (21 - 41)  SpO2: 98% (09-09-24 @ 06:00) (93% - 100%)  Wt(kg): --        09-08-24 @ 07:01  -  09-09-24 @ 07:00  --------------------------------------------------------  IN: 651.3 mL / OUT: 1515 mL / NET: -863.7 mL        Physical Exam:  	Gen: NAD  	HEENT: Anicteric  	Pulm: CTA B/L  	CV: S1S2+  	Abd: Soft, +BS   	Ext: No LE edema B/L  	Neuro: Awake              : Anahi cath+  	Dialysis access: Right IJ      LABS/STUDIES  --------------------------------------------------------------------------------              8.4    6.87  >-----------<  56       [09-09-24 @ 01:45]              24.7     142  |  101  |  101  ----------------------------<  152      [09-09-24 @ 01:45]  4.4   |  23  |  4.48        Ca     8.7     [09-09-24 @ 01:45]      Mg     2.70     [09-09-24 @ 01:45]      Phos  7.4     [09-09-24 @ 01:45]    TPro  5.8  /  Alb  2.6  /  TBili  0.4  /  DBili  x   /  AST  21  /  ALT  14  /  AlkPhos  100  [09-09-24 @ 01:45]    PT/INR: PT 11.6 , INR 1.04       [09-09-24 @ 01:45]  PTT: 23.7       [09-09-24 @ 01:45]      Creatinine Trend:  SCr 4.48 [09-09 @ 01:45]  SCr 6.55 [09-08 @ 14:16]  SCr 6.61 [09-08 @ 03:10]  SCr 6.32 [09-07 @ 09:54]  SCr 5.99 [09-07 @ 02:20]    Urinalysis - [09-09-24 @ 01:45]      Color  / Appearance  / SG  / pH       Gluc 152 / Ketone   / Bili  / Urobili        Blood  / Protein  / Leuk Est  / Nitrite       RBC  / WBC  / Hyaline  / Gran  / Sq Epi  / Non Sq Epi  / Bacteria    [facility] DIVISION OF KIDNEY DISEASES AND HYPERTENSION   FOLLOW UP NOTE    --------------------------------------------------------------------------------  Chief Complaint: LINA    24 hour events/subjective: Pt. received first HD treatment yesterday.  Pt. seen and examined in MICU earlier today. Pt. awake, however unable to obtain ROS from pt.        PAST HISTORY  --------------------------------------------------------------------------------  No significant changes to PMH, PSH, FHx, SHx, unless otherwise noted    ALLERGIES & MEDICATIONS  --------------------------------------------------------------------------------  Allergies    No Known Allergies    Intolerances    Standing Inpatient Medications  chlorhexidine 2% Cloths 1 Application(s) Topical <User Schedule>  chlorhexidine 4% Liquid 1 Application(s) Topical <User Schedule>  dexMEDEtomidine Infusion 0.5 MICROgram(s)/kG/Hr IV Continuous <Continuous>  ertapenem  IVPB 500 milliGRAM(s) IV Intermittent every 24 hours  heparin   Injectable 5000 Unit(s) SubCutaneous every 8 hours  sevelamer carbonate 1600 milliGRAM(s) Oral three times a day with meals    PRN Inpatient Medications  sodium chloride 0.9% lock flush 10 milliLiter(s) IV Push every 1 hour PRN    REVIEW OF SYSTEMS  --------------------------------------------------------------------------------  Unable to obtain.    VITALS/PHYSICAL EXAM  --------------------------------------------------------------------------------  T(C): 37.9 (09-09-24 @ 04:00), Max: 38.9 (09-09-24 @ 03:00)  HR: 95 (09-09-24 @ 06:00) (76 - 110)  BP: 100/79 (09-09-24 @ 06:00) (95/68 - 170/81)  RR: 24 (09-09-24 @ 06:00) (21 - 41)  SpO2: 98% (09-09-24 @ 06:00) (93% - 100%)  Wt(kg): --    09-08-24 @ 07:01  -  09-09-24 @ 07:00  --------------------------------------------------------  IN: 651.3 mL / OUT: 1515 mL / NET: -863.7 mL    Physical Exam:  	Gen: resting,   	HEENT: Anicteric  	Pulm: CTA B/L  	CV: S1S2+  	Abd: Soft, +BS   	Ext: No LE edema B/L  	Neuro: Awake however unable to obtain ROS              : Anahi maldonado  	Dialysis access: Right IJ tunneled HD catheter       LABS/STUDIES  --------------------------------------------------------------------------------              8.4    6.87  >-----------<  56       [09-09-24 @ 01:45]              24.7     142  |  101  |  101  ----------------------------<  152      [09-09-24 @ 01:45]  4.4   |  23  |  4.48        Ca     8.7     [09-09-24 @ 01:45]      Mg     2.70     [09-09-24 @ 01:45]      Phos  7.4     [09-09-24 @ 01:45]    TPro  5.8  /  Alb  2.6  /  TBili  0.4  /  DBili  x   /  AST  21  /  ALT  14  /  AlkPhos  100  [09-09-24 @ 01:45]    Creatinine Trend:  SCr 4.48 [09-09 @ 01:45]  SCr 6.55 [09-08 @ 14:16]  SCr 6.61 [09-08 @ 03:10]  SCr 6.32 [09-07 @ 09:54]  SCr 5.99 [09-07 @ 02:20]

## 2024-09-09 NOTE — PROGRESS NOTE ADULT - SUBJECTIVE AND OBJECTIVE BOX
*******************************  Iker Calderon MD (PGY-1)  Internal Medicine  Contact via [employer] TEAMS    *******************************    INTERVAL HPI/OVERNIGHT EVENTS: No acute events overnight.    SUBJECTIVE: Patient seen and examined at bedside.     MEDICATIONS  (STANDING):  chlorhexidine 2% Cloths 1 Application(s) Topical <User Schedule>  chlorhexidine 4% Liquid 1 Application(s) Topical <User Schedule>  dexMEDEtomidine Infusion 0.5 MICROgram(s)/kG/Hr (7.89 mL/Hr) IV Continuous <Continuous>  ertapenem  IVPB 500 milliGRAM(s) IV Intermittent every 24 hours  heparin   Injectable 5000 Unit(s) SubCutaneous every 8 hours  sevelamer carbonate 1600 milliGRAM(s) Oral three times a day with meals    MEDICATIONS  (PRN):  sodium chloride 0.9% lock flush 10 milliLiter(s) IV Push every 1 hour PRN Pre/post blood products, medications, blood draw, and to maintain line patency      ALLERGIES:  Allergies    No Known Allergies    Intolerances        VITAL SIGNS:  ICU Vital Signs Last 24 Hrs  T(C): 37.9 (09 Sep 2024 04:00), Max: 38.9 (09 Sep 2024 03:00)  T(F): 100.2 (09 Sep 2024 04:00), Max: 102 (09 Sep 2024 03:00)  HR: 95 (09 Sep 2024 06:00) (76 - 110)  BP: 100/79 (09 Sep 2024 06:00) (95/68 - 190/61)  BP(mean): 83 (09 Sep 2024 06:00) (61 - 101)  ABP: --  ABP(mean): --  RR: 24 (09 Sep 2024 06:00) (21 - 41)  SpO2: 98% (09 Sep 2024 06:00) (93% - 100%)    O2 Parameters below as of 08 Sep 2024 21:45  Patient On (Oxygen Delivery Method): nasal cannula  O2 Flow (L/min): 2          Plateau pressure:   P/F ratio:     09-08 @ 07:01  -  09-09 @ 07:00  --------------------------------------------------------  IN: 651.3 mL / OUT: 1515 mL / NET: -863.7 mL      CAPILLARY BLOOD GLUCOSE      POCT Blood Glucose.: 170 mg/dL (09 Sep 2024 01:48)    ECG:    PHYSICAL EXAM:  GENERAL: NAD, lying in bed comfortably  HEAD:  Atraumatic, normocephalic  EYES: EOMI, PERRLA, conjunctiva and sclera clear  ENT: Moist mucous membranes  NECK: Supple, no JVD  HEART: S1, S2, regular rate and rhythm, no murmurs, rubs, or gallops  LUNGS: Unlabored respirations.  Clear to auscultation bilaterally, no crackles, wheezing, or rhonchi  ABDOMEN: Soft, nontender, nondistended, +BS  EXTREMITIES: 2+ peripheral pulses bilaterally. No clubbing, cyanosis, or edema  NERVOUS SYSTEM:  A&Ox3, no focal deficits   SKIN: No rashes or lesions  LINES:     LABS:                        8.4    6.87  )-----------( 56       ( 09 Sep 2024 01:45 )             24.7     09-09    142  |  101  |  101<H>  ----------------------------<  152<H>  4.4   |  23  |  4.48<H>    Ca    8.7      09 Sep 2024 01:45  Phos  7.4     09-09  Mg     2.70     09-09    TPro  5.8<L>  /  Alb  2.6<L>  /  TBili  0.4  /  DBili  x   /  AST  21  /  ALT  14  /  AlkPhos  100  09-09    PT/INR - ( 09 Sep 2024 01:45 )   PT: 11.6 sec;   INR: 1.04 ratio         PTT - ( 09 Sep 2024 01:45 )  PTT:23.7 sec  Urinalysis Basic - ( 09 Sep 2024 01:45 )    Color: x / Appearance: x / SG: x / pH: x  Gluc: 152 mg/dL / Ketone: x  / Bili: x / Urobili: x   Blood: x / Protein: x / Nitrite: x   Leuk Esterase: x / RBC: x / WBC x   Sq Epi: x / Non Sq Epi: x / Bacteria: x        RADIOLOGY & ADDITIONAL TESTS:  *******************************  Iker Calderon MD (PGY-1)  Internal Medicine  Contact via [employer] TEAMS    *******************************    INTERVAL HPI/OVERNIGHT EVENTS: No acute events overnight.    SUBJECTIVE: Patient seen and examined at bedside.     MEDICATIONS  (STANDING):  chlorhexidine 2% Cloths 1 Application(s) Topical <User Schedule>  chlorhexidine 4% Liquid 1 Application(s) Topical <User Schedule>  dexMEDEtomidine Infusion 0.5 MICROgram(s)/kG/Hr (7.89 mL/Hr) IV Continuous <Continuous>  ertapenem  IVPB 500 milliGRAM(s) IV Intermittent every 24 hours  heparin   Injectable 5000 Unit(s) SubCutaneous every 8 hours  sevelamer carbonate 1600 milliGRAM(s) Oral three times a day with meals    MEDICATIONS  (PRN):  sodium chloride 0.9% lock flush 10 milliLiter(s) IV Push every 1 hour PRN Pre/post blood products, medications, blood draw, and to maintain line patency      ALLERGIES:  Allergies    No Known Allergies    Intolerances        VITAL SIGNS:  ICU Vital Signs Last 24 Hrs  T(C): 37.9 (09 Sep 2024 04:00), Max: 38.9 (09 Sep 2024 03:00)  T(F): 100.2 (09 Sep 2024 04:00), Max: 102 (09 Sep 2024 03:00)  HR: 95 (09 Sep 2024 06:00) (76 - 110)  BP: 100/79 (09 Sep 2024 06:00) (95/68 - 190/61)  BP(mean): 83 (09 Sep 2024 06:00) (61 - 101)  ABP: --  ABP(mean): --  RR: 24 (09 Sep 2024 06:00) (21 - 41)  SpO2: 98% (09 Sep 2024 06:00) (93% - 100%)    O2 Parameters below as of 08 Sep 2024 21:45  Patient On (Oxygen Delivery Method): nasal cannula  O2 Flow (L/min): 2          Plateau pressure:   P/F ratio:     09-08 @ 07:01  -  09-09 @ 07:00  --------------------------------------------------------  IN: 651.3 mL / OUT: 1515 mL / NET: -863.7 mL      CAPILLARY BLOOD GLUCOSE      POCT Blood Glucose.: 170 mg/dL (09 Sep 2024 01:48)    ECG:    PHYSICAL EXAM:  GENERAL: NAD, lying in bed comfortably  HEAD:  Atraumatic, normocephalic  EYES: EOMI, PERRL, conjunctiva and sclera clear  NECK: Supple, trachea midline  HEART: Regular rate and rhythm, no murmurs, rubs, or gallops  LUNGS: Unlabored respirations.  Clear to auscultation bilaterally, no crackles, wheezing, or rhonchi  ABDOMEN: Soft, nontender, nondistended  EXTREMITIES: No clubbing, cyanosis, or edema, ecchymoses noted to left lower extremity  NERVOUS SYSTEM:  A&Ox0, not following commands, moving all extremities, no apparent focal deficits     LINES:     LABS:                        8.4    6.87  )-----------( 56       ( 09 Sep 2024 01:45 )             24.7     09-09    142  |  101  |  101<H>  ----------------------------<  152<H>  4.4   |  23  |  4.48<H>    Ca    8.7      09 Sep 2024 01:45  Phos  7.4     09-09  Mg     2.70     09-09    TPro  5.8<L>  /  Alb  2.6<L>  /  TBili  0.4  /  DBili  x   /  AST  21  /  ALT  14  /  AlkPhos  100  09-09    PT/INR - ( 09 Sep 2024 01:45 )   PT: 11.6 sec;   INR: 1.04 ratio         PTT - ( 09 Sep 2024 01:45 )  PTT:23.7 sec  Urinalysis Basic - ( 09 Sep 2024 01:45 )    Color: x / Appearance: x / SG: x / pH: x  Gluc: 152 mg/dL / Ketone: x  / Bili: x / Urobili: x   Blood: x / Protein: x / Nitrite: x   Leuk Esterase: x / RBC: x / WBC x   Sq Epi: x / Non Sq Epi: x / Bacteria: x        RADIOLOGY & ADDITIONAL TESTS:  *******************************  Iker Calderon MD (PGY-1)  Internal Medicine  Contact via [employer] TEAMS    *******************************    INTERVAL HPI/OVERNIGHT EVENTS: No acute events overnight. Fever overnight 102F    SUBJECTIVE: Patient seen and examined at bedside.     MEDICATIONS  (STANDING):  chlorhexidine 2% Cloths 1 Application(s) Topical <User Schedule>  chlorhexidine 4% Liquid 1 Application(s) Topical <User Schedule>  dexMEDEtomidine Infusion 0.5 MICROgram(s)/kG/Hr (7.89 mL/Hr) IV Continuous <Continuous>  ertapenem  IVPB 500 milliGRAM(s) IV Intermittent every 24 hours  heparin   Injectable 5000 Unit(s) SubCutaneous every 8 hours  sevelamer carbonate 1600 milliGRAM(s) Oral three times a day with meals    MEDICATIONS  (PRN):  sodium chloride 0.9% lock flush 10 milliLiter(s) IV Push every 1 hour PRN Pre/post blood products, medications, blood draw, and to maintain line patency      ALLERGIES:  Allergies    No Known Allergies    Intolerances        VITAL SIGNS:  ICU Vital Signs Last 24 Hrs  T(C): 37.9 (09 Sep 2024 04:00), Max: 38.9 (09 Sep 2024 03:00)  T(F): 100.2 (09 Sep 2024 04:00), Max: 102 (09 Sep 2024 03:00)  HR: 95 (09 Sep 2024 06:00) (76 - 110)  BP: 100/79 (09 Sep 2024 06:00) (95/68 - 190/61)  BP(mean): 83 (09 Sep 2024 06:00) (61 - 101)  ABP: --  ABP(mean): --  RR: 24 (09 Sep 2024 06:00) (21 - 41)  SpO2: 98% (09 Sep 2024 06:00) (93% - 100%)    O2 Parameters below as of 08 Sep 2024 21:45  Patient On (Oxygen Delivery Method): nasal cannula  O2 Flow (L/min): 2          Plateau pressure:   P/F ratio:     09-08 @ 07:01  -  09-09 @ 07:00  --------------------------------------------------------  IN: 651.3 mL / OUT: 1515 mL / NET: -863.7 mL      CAPILLARY BLOOD GLUCOSE      POCT Blood Glucose.: 170 mg/dL (09 Sep 2024 01:48)    ECG:    PHYSICAL EXAM:  GENERAL: NAD, lying in bed comfortably  HEAD:  Atraumatic, normocephalic  EYES: EOMI, PERRL, conjunctiva and sclera clear  NECK: Supple, trachea midline  HEART: Regular rate and rhythm, no murmurs, rubs, or gallops  LUNGS: Unlabored respirations.  Clear to auscultation bilaterally, no crackles, wheezing, or rhonchi  ABDOMEN: Soft, nontender, nondistended  EXTREMITIES: No clubbing, cyanosis, or edema, ecchymoses noted to left lower extremity  NERVOUS SYSTEM:  A&Ox0, not following commands, moving all extremities, no apparent focal deficits     LINES:     LABS:                        8.4    6.87  )-----------( 56       ( 09 Sep 2024 01:45 )             24.7     09-09    142  |  101  |  101<H>  ----------------------------<  152<H>  4.4   |  23  |  4.48<H>    Ca    8.7      09 Sep 2024 01:45  Phos  7.4     09-09  Mg     2.70     09-09    TPro  5.8<L>  /  Alb  2.6<L>  /  TBili  0.4  /  DBili  x   /  AST  21  /  ALT  14  /  AlkPhos  100  09-09    PT/INR - ( 09 Sep 2024 01:45 )   PT: 11.6 sec;   INR: 1.04 ratio         PTT - ( 09 Sep 2024 01:45 )  PTT:23.7 sec  Urinalysis Basic - ( 09 Sep 2024 01:45 )    Color: x / Appearance: x / SG: x / pH: x  Gluc: 152 mg/dL / Ketone: x  / Bili: x / Urobili: x   Blood: x / Protein: x / Nitrite: x   Leuk Esterase: x / RBC: x / WBC x   Sq Epi: x / Non Sq Epi: x / Bacteria: x        RADIOLOGY & ADDITIONAL TESTS:  *******************************  Iker Calderon MD (PGY-1)  Internal Medicine  Contact via [employer] TEAMS    *******************************    INTERVAL HPI/OVERNIGHT EVENTS: Fever overnight 102F, otherwise no other events.    SUBJECTIVE: Patient seen and examined at bedside.     MEDICATIONS  (STANDING):  chlorhexidine 2% Cloths 1 Application(s) Topical <User Schedule>  chlorhexidine 4% Liquid 1 Application(s) Topical <User Schedule>  dexMEDEtomidine Infusion 0.5 MICROgram(s)/kG/Hr (7.89 mL/Hr) IV Continuous <Continuous>  ertapenem  IVPB 500 milliGRAM(s) IV Intermittent every 24 hours  heparin   Injectable 5000 Unit(s) SubCutaneous every 8 hours  sevelamer carbonate 1600 milliGRAM(s) Oral three times a day with meals    MEDICATIONS  (PRN):  sodium chloride 0.9% lock flush 10 milliLiter(s) IV Push every 1 hour PRN Pre/post blood products, medications, blood draw, and to maintain line patency      ALLERGIES:  Allergies    No Known Allergies    Intolerances        VITAL SIGNS:  ICU Vital Signs Last 24 Hrs  T(C): 37.9 (09 Sep 2024 04:00), Max: 38.9 (09 Sep 2024 03:00)  T(F): 100.2 (09 Sep 2024 04:00), Max: 102 (09 Sep 2024 03:00)  HR: 95 (09 Sep 2024 06:00) (76 - 110)  BP: 100/79 (09 Sep 2024 06:00) (95/68 - 190/61)  BP(mean): 83 (09 Sep 2024 06:00) (61 - 101)  ABP: --  ABP(mean): --  RR: 24 (09 Sep 2024 06:00) (21 - 41)  SpO2: 98% (09 Sep 2024 06:00) (93% - 100%)    O2 Parameters below as of 08 Sep 2024 21:45  Patient On (Oxygen Delivery Method): nasal cannula  O2 Flow (L/min): 2          Plateau pressure:   P/F ratio:     09-08 @ 07:01  -  09-09 @ 07:00  --------------------------------------------------------  IN: 651.3 mL / OUT: 1515 mL / NET: -863.7 mL      CAPILLARY BLOOD GLUCOSE      POCT Blood Glucose.: 170 mg/dL (09 Sep 2024 01:48)    ECG:    PHYSICAL EXAM:  GENERAL: NAD, lying in bed comfortably  HEAD:  Atraumatic, normocephalic  EYES: EOMI, PERRL, conjunctiva and sclera clear  NECK: Supple, trachea midline  HEART: Regular rate and rhythm, no murmurs, rubs, or gallops  LUNGS: Unlabored respirations.  Clear to auscultation bilaterally, no crackles, wheezing, or rhonchi  ABDOMEN: Soft, nontender, nondistended  EXTREMITIES: No clubbing, cyanosis, or edema, ecchymoses noted to left lower extremity  NERVOUS SYSTEM:  A&Ox0, not following commands, moving all extremities, no apparent focal deficits     LINES:     LABS:                        8.4    6.87  )-----------( 56       ( 09 Sep 2024 01:45 )             24.7     09-09    142  |  101  |  101<H>  ----------------------------<  152<H>  4.4   |  23  |  4.48<H>    Ca    8.7      09 Sep 2024 01:45  Phos  7.4     09-09  Mg     2.70     09-09    TPro  5.8<L>  /  Alb  2.6<L>  /  TBili  0.4  /  DBili  x   /  AST  21  /  ALT  14  /  AlkPhos  100  09-09    PT/INR - ( 09 Sep 2024 01:45 )   PT: 11.6 sec;   INR: 1.04 ratio         PTT - ( 09 Sep 2024 01:45 )  PTT:23.7 sec  Urinalysis Basic - ( 09 Sep 2024 01:45 )    Color: x / Appearance: x / SG: x / pH: x  Gluc: 152 mg/dL / Ketone: x  / Bili: x / Urobili: x   Blood: x / Protein: x / Nitrite: x   Leuk Esterase: x / RBC: x / WBC x   Sq Epi: x / Non Sq Epi: x / Bacteria: x        RADIOLOGY & ADDITIONAL TESTS:

## 2024-09-09 NOTE — DIETITIAN INITIAL EVALUATION ADULT - OTHER INFO
Pt. confused/disoriented & unable to provide nutrition Hx @ this time.   Pt. alert, although confused/disoriented & unable to provide nutrition Hx @ this time.    Spoke with RN and resident physician.  Advised to decrease enteral goal rate to 50mL/hr over 18hrs.  Also verbalized suggestion for Nephro-johny and to obtain iron studies.

## 2024-09-09 NOTE — PROCEDURE NOTE - NSSITEPREP_SKIN_A_CORE
chlorhexidine/Adherence to aseptic technique: hand hygiene prior to donning barriers (gown, gloves), don cap and mask, sterile drape over patient
Adherence to aseptic technique: hand hygiene prior to donning barriers (gown, gloves), don cap and mask, sterile drape over patient
chlorhexidine

## 2024-09-10 LAB
ALBUMIN SERPL ELPH-MCNC: 2.6 G/DL — LOW (ref 3.3–5)
ALP SERPL-CCNC: 103 U/L — SIGNIFICANT CHANGE UP (ref 40–120)
ALT FLD-CCNC: 16 U/L — SIGNIFICANT CHANGE UP (ref 4–33)
ANION GAP SERPL CALC-SCNC: 16 MMOL/L — HIGH (ref 7–14)
APTT BLD: 25.2 SEC — SIGNIFICANT CHANGE UP (ref 24.5–35.6)
AST SERPL-CCNC: 32 U/L — SIGNIFICANT CHANGE UP (ref 4–32)
BILIRUB SERPL-MCNC: 0.5 MG/DL — SIGNIFICANT CHANGE UP (ref 0.2–1.2)
BUN SERPL-MCNC: 66 MG/DL — HIGH (ref 7–23)
CALCIUM SERPL-MCNC: 8.5 MG/DL — SIGNIFICANT CHANGE UP (ref 8.4–10.5)
CHLORIDE SERPL-SCNC: 106 MMOL/L — SIGNIFICANT CHANGE UP (ref 98–107)
CO2 SERPL-SCNC: 23 MMOL/L — SIGNIFICANT CHANGE UP (ref 22–31)
CREAT SERPL-MCNC: 3.44 MG/DL — HIGH (ref 0.5–1.3)
EGFR: 15 ML/MIN/1.73M2 — LOW
GLUCOSE BLDC GLUCOMTR-MCNC: 126 MG/DL — HIGH (ref 70–99)
GLUCOSE SERPL-MCNC: 103 MG/DL — HIGH (ref 70–99)
HBV SURFACE AB SER-ACNC: <3 MIU/ML — LOW
HCT VFR BLD CALC: 25 % — LOW (ref 34.5–45)
HGB BLD-MCNC: 8 G/DL — LOW (ref 11.5–15.5)
MAGNESIUM SERPL-MCNC: 2.5 MG/DL — SIGNIFICANT CHANGE UP (ref 1.6–2.6)
MCHC RBC-ENTMCNC: 25.6 PG — LOW (ref 27–34)
MCHC RBC-ENTMCNC: 32 GM/DL — SIGNIFICANT CHANGE UP (ref 32–36)
MCV RBC AUTO: 80.1 FL — SIGNIFICANT CHANGE UP (ref 80–100)
NRBC # BLD: 0 /100 WBCS — SIGNIFICANT CHANGE UP (ref 0–0)
NRBC # FLD: 0 K/UL — SIGNIFICANT CHANGE UP (ref 0–0)
PHOSPHATE SERPL-MCNC: 4.5 MG/DL — SIGNIFICANT CHANGE UP (ref 2.5–4.5)
PLATELET # BLD AUTO: 65 K/UL — LOW (ref 150–400)
POTASSIUM SERPL-MCNC: 4 MMOL/L — SIGNIFICANT CHANGE UP (ref 3.5–5.3)
POTASSIUM SERPL-SCNC: 4 MMOL/L — SIGNIFICANT CHANGE UP (ref 3.5–5.3)
PROT SERPL-MCNC: 6 G/DL — SIGNIFICANT CHANGE UP (ref 6–8.3)
RBC # BLD: 3.12 M/UL — LOW (ref 3.8–5.2)
RBC # FLD: 16.4 % — HIGH (ref 10.3–14.5)
SODIUM SERPL-SCNC: 145 MMOL/L — SIGNIFICANT CHANGE UP (ref 135–145)
WBC # BLD: 3.94 K/UL — SIGNIFICANT CHANGE UP (ref 3.8–10.5)
WBC # FLD AUTO: 3.94 K/UL — SIGNIFICANT CHANGE UP (ref 3.8–10.5)

## 2024-09-10 PROCEDURE — 99223 1ST HOSP IP/OBS HIGH 75: CPT | Mod: GC

## 2024-09-10 PROCEDURE — 99233 SBSQ HOSP IP/OBS HIGH 50: CPT

## 2024-09-10 PROCEDURE — 99232 SBSQ HOSP IP/OBS MODERATE 35: CPT | Mod: GC

## 2024-09-10 RX ORDER — ALCOHOL ANTISEPTIC PADS
25 PADS, MEDICATED (EA) TOPICAL ONCE
Refills: 0 | Status: DISCONTINUED | OUTPATIENT
Start: 2024-09-10 | End: 2024-09-22

## 2024-09-10 RX ORDER — ALCOHOL ANTISEPTIC PADS
12.5 PADS, MEDICATED (EA) TOPICAL ONCE
Refills: 0 | Status: DISCONTINUED | OUTPATIENT
Start: 2024-09-10 | End: 2024-09-22

## 2024-09-10 RX ORDER — SODIUM CHLORIDE IRRIG SOLUTION 0.9 %
1000 SOLUTION, IRRIGATION IRRIGATION
Refills: 0 | Status: DISCONTINUED | OUTPATIENT
Start: 2024-09-10 | End: 2024-09-22

## 2024-09-10 RX ORDER — ALCOHOL ANTISEPTIC PADS
15 PADS, MEDICATED (EA) TOPICAL ONCE
Refills: 0 | Status: DISCONTINUED | OUTPATIENT
Start: 2024-09-10 | End: 2024-09-22

## 2024-09-10 RX ORDER — GLUCAGON INJECTION, SOLUTION 0.5 MG/.1ML
1 INJECTION, SOLUTION SUBCUTANEOUS ONCE
Refills: 0 | Status: DISCONTINUED | OUTPATIENT
Start: 2024-09-10 | End: 2024-09-22

## 2024-09-10 RX ORDER — MUPIROCIN 20 MG/G
1 OINTMENT TOPICAL
Refills: 0 | Status: COMPLETED | OUTPATIENT
Start: 2024-09-10 | End: 2024-09-14

## 2024-09-10 RX ADMIN — Medication 5000 UNIT(S): at 13:31

## 2024-09-10 RX ADMIN — MULTI VITAMIN/MINERAL SUPPLEMENT WITH ASCORBIC ACID, NIACIN, PYRIDOXINE, PANTOTHENIC ACID, FOLIC ACID, RIBOFLAVIN, THIAMIN, BIOTIN, COBALAMIN AND ZINC. 1 TABLET(S): 60; 20; 12.5; 10; 10; 1.7; 1.5; 1; .3; .006 TABLET, COATED ORAL at 11:34

## 2024-09-10 RX ADMIN — SEVELAMER CARBONATE 1600 MILLIGRAM(S): 800 TABLET, FILM COATED ORAL at 11:34

## 2024-09-10 RX ADMIN — Medication 5000 UNIT(S): at 21:17

## 2024-09-10 RX ADMIN — MUPIROCIN 1 APPLICATION(S): 20 OINTMENT TOPICAL at 17:22

## 2024-09-10 RX ADMIN — CHLORHEXIDINE GLUCONATE ORAL RINSE 1 APPLICATION(S): 1.2 SOLUTION DENTAL at 05:19

## 2024-09-10 RX ADMIN — ERTAPENEM 100 MILLIGRAM(S): 1 INJECTION, POWDER, LYOPHILIZED, FOR SOLUTION INTRAMUSCULAR; INTRAVENOUS at 17:24

## 2024-09-10 RX ADMIN — MUPIROCIN 1 APPLICATION(S): 20 OINTMENT TOPICAL at 05:18

## 2024-09-10 RX ADMIN — Medication 5000 UNIT(S): at 05:19

## 2024-09-10 RX ADMIN — SEVELAMER CARBONATE 1600 MILLIGRAM(S): 800 TABLET, FILM COATED ORAL at 17:22

## 2024-09-10 NOTE — CHART NOTE - NSCHARTNOTEFT_GEN_A_CORE
MAR Accept Note    Transfer from: MICU    Bed: 5N 505A    Accepting physician: Kayleigh    HPI/MICU Course: Ms. Aggie Call is a 59yo woman, Yakut speaking, h/o rheumatoid arthritis on MTX, HTN, HLD, and GERD who presents for generalized weakness, AMS, fever/chill for 3-4 days. Per son, patient was given Cipro by PCP for symptoms c/f UTI about 10 days ago, after which she has been becoming more confused, lethargic, throat pain with worsening PO intake and self-discontinued all medications for 3 days.    In the ED, pt was found to be febrile, tachypneic, /107, and increased WOB. Labs notable for K 6.9 (hemolyzed), repeat 5.9 with no significant change on EKG, Scr 6.86, VBG 7.23/30/35/12, repeat VBG 7.16/43/33/13. CT showed Cystitis with ascending ureteritis and pyelonephritis. Pt was given Lokelma, Ca-gluconate, and insulin/detrose for hyperkalemia, LR and Zosyn in the setting of UTI, and placed on bicarb ggt for acidosis.     Pt was admitted to MICU for possible urgent HD and continued monitoring given worsening acidosis on repeat vbg.       MICU COURSE:  Pt with symptoms c/f acidosis, acute renal failure, complicated UTI, and sepsis with gram negative bacteremia. Pt was started on bicarb gtt, BiPAP for increased wob, and Ertapenem x10 days. Metabolic acidosis improved, and pt subsequently monitored off BiPAP and bicarb. Course was c/b fever spikes and associated hypertension, improved with Tylenol and labetalol/hydralazine. Course c/b worsening LINA and AMS i/s/o UTI/sepsis and recent IV contrast. Nephro was consulted for hyperuremia, non-oliguria and recommended HD. Pt is now s/p HD x2, more awake and alert, VSS. CT head 9/9 with no acute changes.      Follow up/to do:  [ ] c/w Ertapenem (9/6 - 9/16)  [ ] f/u nephro recs for subsequent HD if indicated  [ ] Maintain NGT, eval as appropriate for removal      Sree Salazar MD  Internal Medicine PGY3  Available on TEAMS  [facility]: [phone], [facility]: [id number]

## 2024-09-10 NOTE — CHART NOTE - NSCHARTNOTEFT_GEN_A_CORE
[facility] MEDICINE NIGHT COVERAGE     Patient pulled out NGT. Attempted to replace NGT for TF x2 without success. Patient very agitated, despite being in BL wrist restraints. Will endorse to day team to retry when family is present. Fingersticks to be monitored Q6 to evaluate for hypoglycemia. Care ongoing.     MARQUITA Orozco-BC   Medicine ACP   [id number]

## 2024-09-10 NOTE — PROGRESS NOTE ADULT - SUBJECTIVE AND OBJECTIVE BOX
*******************************  Iker Calderon MD (PGY-1)  Internal Medicine  Contact via [employer] TEAMS    *******************************    INTERVAL HPI/OVERNIGHT EVENTS: No acute events overnight. pending placement for PSR bed, pulled out NG tube yesterday.    SUBJECTIVE: Patient seen and examined at bedside.     MEDICATIONS  (STANDING):  chlorhexidine 2% Cloths 1 Application(s) Topical <User Schedule>  ertapenem  IVPB 500 milliGRAM(s) IV Intermittent every 24 hours  heparin   Injectable 5000 Unit(s) SubCutaneous every 8 hours  mupirocin 2% Ointment 1 Application(s) Topical two times a day  Nephro-johny 1 Tablet(s) Oral daily  sevelamer carbonate 1600 milliGRAM(s) Oral three times a day with meals    MEDICATIONS  (PRN):  sodium chloride 0.9% lock flush 10 milliLiter(s) IV Push every 1 hour PRN Pre/post blood products, medications, blood draw, and to maintain line patency      ALLERGIES:  Allergies    No Known Allergies    Intolerances        VITAL SIGNS:  ICU Vital Signs Last 24 Hrs  T(C): 38 (10 Sep 2024 04:00), Max: 38 (10 Sep 2024 00:00)  T(F): 100.4 (10 Sep 2024 04:00), Max: 100.4 (10 Sep 2024 00:00)  HR: 96 (10 Sep 2024 06:00) (83 - 108)  BP: 148/60 (10 Sep 2024 06:00) (138/56 - 189/77)  BP(mean): 83 (10 Sep 2024 06:00) (70 - 128)  ABP: --  ABP(mean): --  RR: 25 (10 Sep 2024 06:00) (19 - 33)  SpO2: 97% (10 Sep 2024 06:00) (95% - 100%)    O2 Parameters below as of 10 Sep 2024 06:00  Patient On (Oxygen Delivery Method): room air            Plateau pressure:   P/F ratio:     09-09 @ 07:01  -  09-10 @ 07:00  --------------------------------------------------------  IN: 850 mL / OUT: 2175 mL / NET: -1325 mL      CAPILLARY BLOOD GLUCOSE      POCT Blood Glucose.: 170 mg/dL (09 Sep 2024 01:48)    ECG:    PHYSICAL EXAM:  GENERAL: NAD, lying in bed comfortably  HEAD:  Atraumatic, normocephalic  EYES: EOMI, PERRLA, conjunctiva and sclera clear  ENT: Moist mucous membranes  NECK: Supple, no JVD  HEART: S1, S2, regular rate and rhythm, no murmurs, rubs, or gallops  LUNGS: Unlabored respirations.  Clear to auscultation bilaterally, no crackles, wheezing, or rhonchi  ABDOMEN: Soft, nontender, nondistended, +BS  EXTREMITIES: 2+ peripheral pulses bilaterally. No clubbing, cyanosis, or edema  NERVOUS SYSTEM:  A&Ox3, no focal deficits   SKIN: No rashes or lesions  LINES:     LABS:                        8.0    3.94  )-----------( 65       ( 10 Sep 2024 01:10 )             25.0     09-10    145  |  106  |  66<H>  ----------------------------<  103<H>  4.0   |  23  |  3.44<H>    Ca    8.5      10 Sep 2024 01:10  Phos  4.5     09-10  Mg     2.50     09-10    TPro  6.0  /  Alb  2.6<L>  /  TBili  0.5  /  DBili  x   /  AST  32  /  ALT  16  /  AlkPhos  103  09-10    PT/INR - ( 09 Sep 2024 01:45 )   PT: 11.6 sec;   INR: 1.04 ratio         PTT - ( 10 Sep 2024 01:10 )  PTT:25.2 sec  Urinalysis Basic - ( 10 Sep 2024 01:10 )    Color: x / Appearance: x / SG: x / pH: x  Gluc: 103 mg/dL / Ketone: x  / Bili: x / Urobili: x   Blood: x / Protein: x / Nitrite: x   Leuk Esterase: x / RBC: x / WBC x   Sq Epi: x / Non Sq Epi: x / Bacteria: x        RADIOLOGY & ADDITIONAL TESTS:  *******************************  Iker Calderon MD (PGY-1)  Internal Medicine  Contact via [employer] TEAMS    *******************************    INTERVAL HPI/OVERNIGHT EVENTS: No acute events overnight. pending placement for PSR bed, pulled out NG tube yesterday. Dialysis with 0L removed.    SUBJECTIVE: Patient seen and examined at bedside.     MEDICATIONS  (STANDING):  chlorhexidine 2% Cloths 1 Application(s) Topical <User Schedule>  ertapenem  IVPB 500 milliGRAM(s) IV Intermittent every 24 hours  heparin   Injectable 5000 Unit(s) SubCutaneous every 8 hours  mupirocin 2% Ointment 1 Application(s) Topical two times a day  Nephro-johny 1 Tablet(s) Oral daily  sevelamer carbonate 1600 milliGRAM(s) Oral three times a day with meals    MEDICATIONS  (PRN):  sodium chloride 0.9% lock flush 10 milliLiter(s) IV Push every 1 hour PRN Pre/post blood products, medications, blood draw, and to maintain line patency      ALLERGIES:  Allergies    No Known Allergies    Intolerances        VITAL SIGNS:  ICU Vital Signs Last 24 Hrs  T(C): 38 (10 Sep 2024 04:00), Max: 38 (10 Sep 2024 00:00)  T(F): 100.4 (10 Sep 2024 04:00), Max: 100.4 (10 Sep 2024 00:00)  HR: 96 (10 Sep 2024 06:00) (83 - 108)  BP: 148/60 (10 Sep 2024 06:00) (138/56 - 189/77)  BP(mean): 83 (10 Sep 2024 06:00) (70 - 128)  ABP: --  ABP(mean): --  RR: 25 (10 Sep 2024 06:00) (19 - 33)  SpO2: 97% (10 Sep 2024 06:00) (95% - 100%)    O2 Parameters below as of 10 Sep 2024 06:00  Patient On (Oxygen Delivery Method): room air            Plateau pressure:   P/F ratio:     09-09 @ 07:01  -  09-10 @ 07:00  --------------------------------------------------------  IN: 850 mL / OUT: 2175 mL / NET: -1325 mL      CAPILLARY BLOOD GLUCOSE      POCT Blood Glucose.: 170 mg/dL (09 Sep 2024 01:48)    ECG:    PHYSICAL EXAM:  GENERAL: NAD, lying in bed comfortably  HEAD:  Atraumatic, normocephalic  EYES: EOMI, PERRLA, conjunctiva and sclera clear  ENT: Moist mucous membranes  NECK: Supple, no JVD  HEART: S1, S2, regular rate and rhythm, no murmurs, rubs, or gallops  LUNGS: Unlabored respirations.  Clear to auscultation bilaterally, no crackles, wheezing, or rhonchi  ABDOMEN: Soft, nontender, nondistended, +BS  EXTREMITIES: 2+ peripheral pulses bilaterally. No clubbing, cyanosis, or edema  NERVOUS SYSTEM:  A&Ox3, no focal deficits   SKIN: No rashes or lesions  LINES:     LABS:                        8.0    3.94  )-----------( 65       ( 10 Sep 2024 01:10 )             25.0     09-10    145  |  106  |  66<H>  ----------------------------<  103<H>  4.0   |  23  |  3.44<H>    Ca    8.5      10 Sep 2024 01:10  Phos  4.5     09-10  Mg     2.50     09-10    TPro  6.0  /  Alb  2.6<L>  /  TBili  0.5  /  DBili  x   /  AST  32  /  ALT  16  /  AlkPhos  103  09-10    PT/INR - ( 09 Sep 2024 01:45 )   PT: 11.6 sec;   INR: 1.04 ratio         PTT - ( 10 Sep 2024 01:10 )  PTT:25.2 sec  Urinalysis Basic - ( 10 Sep 2024 01:10 )    Color: x / Appearance: x / SG: x / pH: x  Gluc: 103 mg/dL / Ketone: x  / Bili: x / Urobili: x   Blood: x / Protein: x / Nitrite: x   Leuk Esterase: x / RBC: x / WBC x   Sq Epi: x / Non Sq Epi: x / Bacteria: x        RADIOLOGY & ADDITIONAL TESTS:   < from: CT Head No Cont (09.09.24 @ 16:41) >  There is diffuse cerebral volume loss with prominence of the sulci,   fissures, and cisternal spaces which is normal for the patient's age.   There is mild deep and periventricular white matter hypoattenuation   statistically compatible with microvascular changes given calcific   atherosclerotic disease of the intracranial arteries.    < end of copied text >   *******************************  Iker Calderon MD (PGY-1)  Internal Medicine  Contact via [employer] TEAMS    *******************************    INTERVAL HPI/OVERNIGHT EVENTS: No acute events overnight. pending placement for PSR bed, pulled out NG tube yesterday. Dialysis with 0L removed.     SUBJECTIVE: Patient seen and examined at bedside. Awake and alert.    MEDICATIONS  (STANDING):  chlorhexidine 2% Cloths 1 Application(s) Topical <User Schedule>  ertapenem  IVPB 500 milliGRAM(s) IV Intermittent every 24 hours  heparin   Injectable 5000 Unit(s) SubCutaneous every 8 hours  mupirocin 2% Ointment 1 Application(s) Topical two times a day  Nephro-johny 1 Tablet(s) Oral daily  sevelamer carbonate 1600 milliGRAM(s) Oral three times a day with meals    MEDICATIONS  (PRN):  sodium chloride 0.9% lock flush 10 milliLiter(s) IV Push every 1 hour PRN Pre/post blood products, medications, blood draw, and to maintain line patency      ALLERGIES:  Allergies    No Known Allergies    Intolerances        VITAL SIGNS:  ICU Vital Signs Last 24 Hrs  T(C): 38 (10 Sep 2024 04:00), Max: 38 (10 Sep 2024 00:00)  T(F): 100.4 (10 Sep 2024 04:00), Max: 100.4 (10 Sep 2024 00:00)  HR: 96 (10 Sep 2024 06:00) (83 - 108)  BP: 148/60 (10 Sep 2024 06:00) (138/56 - 189/77)  BP(mean): 83 (10 Sep 2024 06:00) (70 - 128)  ABP: --  ABP(mean): --  RR: 25 (10 Sep 2024 06:00) (19 - 33)  SpO2: 97% (10 Sep 2024 06:00) (95% - 100%)    O2 Parameters below as of 10 Sep 2024 06:00  Patient On (Oxygen Delivery Method): room air            Plateau pressure:   P/F ratio:     09-09 @ 07:01  -  09-10 @ 07:00  --------------------------------------------------------  IN: 850 mL / OUT: 2175 mL / NET: -1325 mL      CAPILLARY BLOOD GLUCOSE      POCT Blood Glucose.: 170 mg/dL (09 Sep 2024 01:48)    ECG:    PHYSICAL EXAM:  GENERAL: NAD, lying in bed comfortably  HEAD:  Atraumatic, normocephalic  EYES: EOMI, PERRLA, conjunctiva and sclera clear  ENT: Moist mucous membranes  NECK: Supple, no JVD  HEART: S1, S2, regular rate and rhythm, no murmurs, rubs, or gallops  LUNGS: Unlabored respirations.  Clear to auscultation bilaterally, no crackles, wheezing, or rhonchi  ABDOMEN: Soft, nontender, nondistended, +BS  EXTREMITIES: 2+ peripheral pulses bilaterally. No clubbing, cyanosis, or edema  NERVOUS SYSTEM:  A&Ox3, no focal deficits   SKIN: No rashes or lesions  LINES:     LABS:                        8.0    3.94  )-----------( 65       ( 10 Sep 2024 01:10 )             25.0     09-10    145  |  106  |  66<H>  ----------------------------<  103<H>  4.0   |  23  |  3.44<H>    Ca    8.5      10 Sep 2024 01:10  Phos  4.5     09-10  Mg     2.50     09-10    TPro  6.0  /  Alb  2.6<L>  /  TBili  0.5  /  DBili  x   /  AST  32  /  ALT  16  /  AlkPhos  103  09-10    PT/INR - ( 09 Sep 2024 01:45 )   PT: 11.6 sec;   INR: 1.04 ratio         PTT - ( 10 Sep 2024 01:10 )  PTT:25.2 sec  Urinalysis Basic - ( 10 Sep 2024 01:10 )    Color: x / Appearance: x / SG: x / pH: x  Gluc: 103 mg/dL / Ketone: x  / Bili: x / Urobili: x   Blood: x / Protein: x / Nitrite: x   Leuk Esterase: x / RBC: x / WBC x   Sq Epi: x / Non Sq Epi: x / Bacteria: x        RADIOLOGY & ADDITIONAL TESTS:   < from: CT Head No Cont (09.09.24 @ 16:41) >  There is diffuse cerebral volume loss with prominence of the sulci,   fissures, and cisternal spaces which is normal for the patient's age.   There is mild deep and periventricular white matter hypoattenuation   statistically compatible with microvascular changes given calcific   atherosclerotic disease of the intracranial arteries.    < end of copied text >

## 2024-09-10 NOTE — PROGRESS NOTE ADULT - SUBJECTIVE AND OBJECTIVE BOX
Follow Up:      Inverval History/ROS:Patient is a 58y old  Female who presents with a chief complaint of urgent HD (10 Sep 2024 09:29)    + fever  Still confused but more alert today    Allergies    No Known Allergies    Intolerances        ANTIMICROBIALS:  ertapenem  IVPB 500 every 24 hours      OTHER MEDS:  chlorhexidine 2% Cloths 1 Application(s) Topical <User Schedule>  heparin   Injectable 5000 Unit(s) SubCutaneous every 8 hours  mupirocin 2% Ointment 1 Application(s) Topical two times a day  Nephro-johny 1 Tablet(s) Oral daily  sevelamer carbonate 1600 milliGRAM(s) Oral three times a day with meals  sodium chloride 0.9% lock flush 10 milliLiter(s) IV Push every 1 hour PRN      Vital Signs Last 24 Hrs  T(C): 37.2 (10 Sep 2024 14:45), Max: 38 (10 Sep 2024 00:00)  T(F): 98.9 (10 Sep 2024 14:45), Max: 100.4 (10 Sep 2024 00:00)  HR: 98 (10 Sep 2024 14:45) (86 - 108)  BP: 150/65 (10 Sep 2024 14:45) (138/59 - 168/63)  BP(mean): 84 (10 Sep 2024 13:00) (70 - 92)  RR: 18 (10 Sep 2024 14:45) (18 - 34)  SpO2: 98% (10 Sep 2024 14:45) (96% - 99%)    Parameters below as of 10 Sep 2024 14:45  Patient On (Oxygen Delivery Method): room air        PHYSICAL EXAM:  General: [ ] non-toxic  HEAD/EYES: [ ] PERRL [x ] white sclera [ ] icterus  ENT:  [ ] normal x[ ] supple [ ] thrush [ ] pharyngeal exudate  Cardiovascular:   [ ] murmur [ ] normal [ ] PPM/AICD  Respiratory:  [ x] clear to ausculation bilaterally  GI:  [x ] soft, non-tender, normal bowel sounds  :  [ ] garcia [ ] no CVA tenderness   Musculoskeletal:  [ ] no synovitis  Neurologic:  [ ] non-focal exam   Skin:  [x ] no rash  Lymph: [x ] no lymphadenopathy  Psychiatric:  [ ] appropriate affect [ ] alert & oriented  Lines:  [x ] no phlebitis [ ] central line                                8.0    3.94  )-----------( 65       ( 10 Sep 2024 01:10 )             25.0       09-10    145  |  106  |  66<H>  ----------------------------<  103<H>  4.0   |  23  |  3.44<H>    Ca    8.5      10 Sep 2024 01:10  Phos  4.5     09-10  Mg     2.50     09-10    TPro  6.0  /  Alb  2.6<L>  /  TBili  0.5  /  DBili  x   /  AST  32  /  ALT  16  /  AlkPhos  103  09-10      Urinalysis Basic - ( 10 Sep 2024 01:10 )    Color: x / Appearance: x / SG: x / pH: x  Gluc: 103 mg/dL / Ketone: x  / Bili: x / Urobili: x   Blood: x / Protein: x / Nitrite: x   Leuk Esterase: x / RBC: x / WBC x   Sq Epi: x / Non Sq Epi: x / Bacteria: x        MICROBIOLOGY:Culture Results:   No growth at 48 Hours (09-07-24 @ 14:33)  Culture Results:   >100,000 CFU/ml Escherichia coli ESBL (09-05-24 @ 22:30)  Culture Results:   Growth in aerobic and anaerobic bottles: Escherichia coli ESBL  See previous culture [id number] (09-05-24 @ 22:07)  Culture Results:   Growth in aerobic and anaerobic bottles: Escherichia coli ESBL  Direct identification is available within approximately 3-5  hours either by Blood Panel Multiplexed PCR or Direct  MALDI-TOF. Details: [email or web address] (09-05-24 @ 20:46)      RADIOLOGY:

## 2024-09-10 NOTE — PROGRESS NOTE ADULT - ASSESSMENT
59yo woman, Estonian speaking, h/o rheumatoid arthritis on MTX, HTN, HLD, prediabetic, and GERD who presents for generalized weakness, AMS, fever/chill for 3-4 days. Recent UTI treated with Cipro outpatient 10 days ago. CT c/f cystitis with ascending ureteritis and pyelonephritis. Pt found to have hyperkalemia, acidosis, and symptoms c/f acute renal failure. Admitted to MICU for urgent HD, had dialysis on 9/8 and 9/9, monitoring for improvement in labs and mental status.    ===================NEURO===================  AAOx0 - unknown baseline  #Lethargic, altered mental status, improving   Likely 2/2 to complicated UTI vs. uremic encephalopathy vs. acidosis  >s/p bicarb gtt  >BCx (+): E.coli ESBL CTX resistant  >continue with ertapenem 500mg qd (renal dose)  > CT Head wo contrast for mental status not improving despite dialysis?  > ID consulted    ==================CARDIAC===================  #HTN  #HLD  on home losartan 50 mg, atorvastatin  hypertension here in the hospital follows when patient has fever, continue to monitor and give acetaminophen when febrile  >holding home meds  >follow-up with family about what blood pressure is like at home    ===================PULM====================  #Acidotic with respiratory compensation, improving  increased WOB in the ED  >s/p BiPAP  >on 2L NC    ===================RENAL====================  #acute renal failure  #metabolic acidosis, resolved  #hyperkalemia, resolved  #uremic  Scr 9/8 is 6.61  BUN improving 143 > 101 (9/9)  Likely 2/2 to complicated UTI vs. hypovolemic (decreased po intake, diarrhea iso misused miralax)    >s/p bicarb gtt  >monitor electrolytes  >Nephro following, HD 9/8 and 9/9  >1L LR  > s/p HD overnight, labs improving but still altered mentation  > euvolemic, net 0 removal over dialysis today (9/9)    =====================GI======================  #Diet  NPO, NGT inserted today, start tube and nephro-vit  Iron studies per nutrition recs    #GERD  on home dexlansoprazole   >hold home meds    ===================HEME/ONC=================  #RA  on home MTX 2.5 mg 6 tablets a week  last took on 9/2    #DVT ppx  Hep subQ q8h    ======================ID========================  #recent UTI treated with Cipro (incomplete, self-discontinued 3-4 days ago)  #Cystitis with ascending ureteritis and pyelonephritis on CT  >BCx (+): E.coli ESBL CTX resistant, UCx (+) E.coli  > s/p zosyn (9/5-9/7)  > continue with ertapenem 500mg qd (renal dose) (9/6 - )  > ID consulted for continued altered mental status  =====================ENDO=================  #prediabetes (last A1c of 6.2)    =====================ETHICS=======================  Full Code 57yo woman, Romanian speaking, h/o rheumatoid arthritis on MTX, HTN, HLD, prediabetic, and GERD who presents for generalized weakness, AMS, fever/chill for 3-4 days. Recent UTI treated with Cipro outpatient 10 days ago. CT c/f cystitis with ascending ureteritis and pyelonephritis. Pt found to have hyperkalemia, acidosis, and symptoms c/f acute renal failure. Admitted to MICU for urgent HD, had dialysis on 9/8 and 9/9, monitoring for improvement in labs and mental status.    ===================NEURO===================  AAOx0 - unknown baseline  #Lethargic, altered mental status, improving   Likely 2/2 to complicated UTI vs. uremic encephalopathy vs. acidosis  >s/p bicarb gtt  >BCx (+): E.coli ESBL CTX resistant  >continue with ertapenem 500mg qd (renal dose)  > CT Head wo contrast for mental status not improving despite dialysis --> no acute changes  > ID consulted    ==================CARDIAC===================  #HTN  #HLD  on home losartan 50 mg, atorvastatin  hypertension here in the hospital follows when patient has fever, continue to monitor and give acetaminophen when febrile  >holding home meds  >follow-up with family about what blood pressure is like at home    ===================PULM====================  #Acidotic with respiratory compensation, improving  increased WOB in the ED  >s/p BiPAP  >on 2L NC    ===================RENAL====================  #acute renal failure  #metabolic acidosis, resolved  #hyperkalemia, resolved  #uremic  Scr 9/8 is 6.61  BUN improving 143 > 101 (9/9)  Likely 2/2 to complicated UTI vs. hypovolemic (decreased po intake, diarrhea iso misused miralax)    >s/p bicarb gtt  >monitor electrolytes  >Nephro following, HD 9/8 and 9/9  >1L LR  > s/p HD overnight, labs improving but still altered mentation  > euvolemic, net 0 removal over dialysis today (9/9)    =====================GI======================  #Diet  NPO, NGT inserted today, start tube and nephro-vit  Iron studies per nutrition recs    #GERD  on home dexlansoprazole   >hold home meds    ===================HEME/ONC=================  #RA  on home MTX 2.5 mg 6 tablets a week  last took on 9/2    #DVT ppx  Hep subQ q8h    ======================ID========================  #recent UTI treated with Cipro (incomplete, self-discontinued 3-4 days ago)  #Cystitis with ascending ureteritis and pyelonephritis on CT  >BCx (+): E.coli ESBL CTX resistant, UCx (+) E.coli  > s/p zosyn (9/5-9/7)  > continue with ertapenem 500mg qd (renal dose) (9/6 - )  > ID consulted for continued altered mental status  =====================ENDO=================  #prediabetes (last A1c of 6.2)    =====================ETHICS=======================  Full Code 59yo woman, Georgian speaking, h/o rheumatoid arthritis on MTX, HTN, HLD, prediabetic, and GERD who presents for generalized weakness, AMS, fever/chill for 3-4 days. Recent UTI treated with Cipro outpatient 10 days ago. CT c/f cystitis with ascending ureteritis and pyelonephritis. Pt found to have hyperkalemia, acidosis, and symptoms c/f acute renal failure. Admitted to MICU for urgent HD, had dialysis on 9/8 and 9/9, monitoring for improvement in labs and mental status.    ===================NEURO===================  AAOx0 - unknown baseline  #Lethargic, altered mental status, improving   Likely 2/2 to complicated UTI vs. uremic encephalopathy vs. acidosis  >s/p bicarb gtt  >BCx (+): E.coli ESBL CTX resistant  >continue with ertapenem 500mg qd (renal dose)  > CT Head wo contrast for mental status not improving despite dialysis --> no acute changes  > ID consulted    ==================CARDIAC===================  #HTN  #HLD  on home losartan 50 mg, atorvastatin  hypertension here in the hospital follows when patient has fever, continue to monitor and give acetaminophen when febrile  >holding home meds  >follow-up with family about what blood pressure is like at home    ===================PULM====================  #Acidotic with respiratory compensation, improving  increased WOB in the ED  >s/p BiPAP  >on 2L NC    ===================RENAL====================  #acute renal failure  #metabolic acidosis, resolved  #hyperkalemia, resolved  #uremic  Scr 9/8 is 6.61  BUN improving 143 > 101 (9/9)  Likely 2/2 to complicated UTI vs. hypovolemic (decreased po intake, diarrhea iso misused miralax)    >s/p bicarb gtt  >monitor electrolytes  >Nephro following, HD 9/8 and 9/9  >1L LR  > s/p HD overnight, labs improving but still altered mentation  > euvolemic, net 0 removal over dialysis today (9/9)    =====================GI======================  #Diet  NPO, NGT inserted today, start tube and nephro-vit  Iron studies per nutrition recs    #GERD  on home dexlansoprazole   >hold home meds    ===================HEME/ONC=================  #RA  on home MTX 2.5 mg 6 tablets a week  last took on 9/2    #Thrombocytopenia  - Platelets decreasing since admission, on heparin subq, monitor for drops    #DVT ppx  Hep subQ q8h    ======================ID========================  #recent UTI treated with Cipro (incomplete, self-discontinued 3-4 days ago)  #Cystitis with ascending ureteritis and pyelonephritis on CT  >BCx (+): E.coli ESBL CTX resistant, UCx (+) E.coli  > s/p zosyn (9/5-9/7)  > continue with ertapenem 500mg qd (renal dose) (9/6 - )  > ID consulted for continued altered mental status  =====================ENDO=================  #prediabetes (last A1c of 6.2)    =====================ETHICS=======================  Full Code

## 2024-09-10 NOTE — PROGRESS NOTE ADULT - ASSESSMENT
58 year old with RA  recent treatment for UTI- partially treated (stopped cipro course early)    Presented with fever  Associated Pyuria    Found to have ascending uti/pyelonephritis  Associated altered mental status    Transferred to ICU with hypoxia  Had LINA  Improved with HD     Urine culture with ESBL E coli  ESBL E coli bacteremia-   Repeat blod culture on 9/7 is without growth     Overall, ESBL E coli uti/ pyelonephritis  Altered mental status  ILNA    Continue ertapenem (started 9/6) - Fever can persists for a few days with pyelonephritis  Monitor Mental status- may be multifactorial- infection/ LINA contributing. If not improving, neurology evaluation  Monitor for response to treatment  Remove mera when feasible

## 2024-09-10 NOTE — PROGRESS NOTE ADULT - ATTENDING COMMENTS
Pt. with LINA, likely ATN. Pt. received HD on 9/8/24 and 9/9/24. Assessment and plan for LINA/HD as outlined above. Monitor labs and urine output. Avoid any potential nephrotoxins. Dose medications as per eGFR. No plan for HD today. Will assess need for HD today.

## 2024-09-10 NOTE — PROGRESS NOTE ADULT - PROBLEM SELECTOR PLAN 1
Pt. with LINA in setting of UTI/sepsis/bacteremia and recent IV contrast use. Pt with likely ATN. Pt initiated on HD on 09/08/24 and 09/09/24 due to altered mental status and concern for uremia, with improvement, AOx1-2 today. Plan to hold HD treatment today on 09/10/24. Pt. non-oliguric with 1.2L UO/24hrs.  Monitor labs and urine output. Avoid any potential nephrotoxins. Dose medications as per eGFR.     Doug Anderson, PGY4  Nephrology Fellow  MS TEAMS preferred  (After 4pm or on weekends, please contact the fellow on call). Pt. with LINA in setting of UTI/sepsis/bacteremia and recent IV contrast use. Pt with likely ATN. Pt. initiated on HD on 9/8/24 due to altered mental status and concern for uremia. Pt. received second HD treatment yesterday. Pt. non-oliguric with 1.2L of UOP in last 24 hrs. No plan for HD today. Will assess need for HD daily. Monitor labs and urine output. Avoid any potential nephrotoxins. Dose medications as per eGFR.     Doug Anderson, PGY4  Nephrology Fellow  MS TEAMS preferred  (After 4pm or on weekends, please contact the fellow on call).

## 2024-09-10 NOTE — PROGRESS NOTE ADULT - SUBJECTIVE AND OBJECTIVE BOX
[facility] DIVISION OF KIDNEY DISEASES AND HYPERTENSION   FOLLOW UP NOTE    --------------------------------------------------------------------------------  Chief Complaint: LINA with uremia    24 hour events/subjective: Pt seen in MICU and received second HD treatment yesterday.  Pt seen and examined in MICU earlier today. Pt more alert this morning that the day prior, was AOx1-2.   PAST HISTORY  --------------------------------------------------------------------------------  No significant changes to PMH, PSH, FHx, SHx, unless otherwise noted    ALLERGIES & MEDICATIONS  --------------------------------------------------------------------------------  Allergies  No Known Allergies    Intolerances      Standing Inpatient Medications  chlorhexidine 2% Cloths 1 Application(s) Topical <User Schedule>  ertapenem  IVPB 500 milliGRAM(s) IV Intermittent every 24 hours  heparin   Injectable 5000 Unit(s) SubCutaneous every 8 hours  mupirocin 2% Ointment 1 Application(s) Topical two times a day  Nephro-johny 1 Tablet(s) Oral daily  sevelamer carbonate 1600 milliGRAM(s) Oral three times a day with meals    PRN Inpatient Medications  sodium chloride 0.9% lock flush 10 milliLiter(s) IV Push every 1 hour PRN    REVIEW OF SYSTEMS  --------------------------------------------------------------------------------  Difficult to obtain due to patient's clinical status.     All other systems were reviewed and are negative, except as noted.    VITALS/PHYSICAL EXAM  --------------------------------------------------------------------------------  T(C): 37.9 (09-10-24 @ 08:00), Max: 38 (09-10-24 @ 00:00)  HR: 87 (09-10-24 @ 08:00) (85 - 108)  BP: 153/68 (09-10-24 @ 08:00) (138/56 - 189/77)  RR: 26 (09-10-24 @ 08:00) (19 - 32)  SpO2: 99% (09-10-24 @ 08:00) (95% - 100%)  Wt(kg): --      09-09-24 @ 07:01  -  09-10-24 @ 07:00  --------------------------------------------------------  IN: 850 mL / OUT: 2175 mL / NET: -1325 mL    Physical Exam:                      Gen: NAD                       Pulm: CTA B/L                       CV: +S1S2                       Abd: +BS, soft, nondistended/nontender                       : jose                       Extremities: trace LE edema                       Neuro: non-focal                       Dialysis access: R IJ      LABS/STUDIES  --------------------------------------------------------------------------------              8.0    3.94  >-----------<  65       [09-10-24 @ 01:10]              25.0     145  |  106  |  66  ----------------------------<  103      [09-10-24 @ 01:10]  4.0   |  23  |  3.44        Ca     8.5     [09-10-24 @ 01:10]      Mg     2.50     [09-10-24 @ 01:10]      Phos  4.5     [09-10-24 @ 01:10]    TPro  6.0  /  Alb  2.6  /  TBili  0.5  /  DBili  x   /  AST  32  /  ALT  16  /  AlkPhos  103  [09-10-24 @ 01:10]    PT/INR: PT 11.6 , INR 1.04       [09-09-24 @ 01:45]  PTT: 25.2       [09-10-24 @ 01:10]    Creatinine Trend:  SCr 3.44 [09-10 @ 01:10]  SCr 4.48 [09-09 @ 01:45]  SCr 6.55 [09-08 @ 14:16]  SCr 6.61 [09-08 @ 03:10]  SCr 6.32 [09-07 @ 09:54]      HBsAb <3.0      [09-08-24 @ 19:49]  HBsAg Nonreact      [09-08-24 @ 19:49]  HBcAb Nonreact      [09-08-24 @ 19:49]  HCV 0.09, Nonreact      [09-08-24 @ 19:49]   [facility] DIVISION OF KIDNEY DISEASES AND HYPERTENSION   FOLLOW UP NOTE    --------------------------------------------------------------------------------  Chief Complaint: LINA    24 hour events/subjective: Pt. received second HD treatment yesterday. Pt. seen and examined in MICU earlier today. Pt. more awake and alert this morning than yesterday however unable to obtain ROS. Pt. non-oliguric with ~1.2 L in last 24 hrs.    PAST HISTORY  --------------------------------------------------------------------------------  No significant changes to PMH, PSH, FHx, SHx, unless otherwise noted    ALLERGIES & MEDICATIONS  --------------------------------------------------------------------------------  Allergies  No Known Allergies    Intolerances    Standing Inpatient Medications  chlorhexidine 2% Cloths 1 Application(s) Topical <User Schedule>  ertapenem  IVPB 500 milliGRAM(s) IV Intermittent every 24 hours  heparin   Injectable 5000 Unit(s) SubCutaneous every 8 hours  mupirocin 2% Ointment 1 Application(s) Topical two times a day  Nephro-johny 1 Tablet(s) Oral daily  sevelamer carbonate 1600 milliGRAM(s) Oral three times a day with meals    PRN Inpatient Medications  sodium chloride 0.9% lock flush 10 milliLiter(s) IV Push every 1 hour PRN    REVIEW OF SYSTEMS  --------------------------------------------------------------------------------  Difficult to obtain due to patient's clinical status.     VITALS/PHYSICAL EXAM  --------------------------------------------------------------------------------  T(C): 37.9 (09-10-24 @ 08:00), Max: 38 (09-10-24 @ 00:00)  HR: 87 (09-10-24 @ 08:00) (85 - 108)  BP: 153/68 (09-10-24 @ 08:00) (138/56 - 189/77)  RR: 26 (09-10-24 @ 08:00) (19 - 32)  SpO2: 99% (09-10-24 @ 08:00) (95% - 100%)  Wt(kg): --    09-09-24 @ 07:01  -  09-10-24 @ 07:00  --------------------------------------------------------  IN: 850 mL / OUT: 2175 mL / NET: -1325 mL    Physical Exam:              Gen: resting,   	HEENT: Anicteric  	Pulm: CTA B/L  	CV: S1S2+  	Abd: Soft, +BS   	Ext: LE trace edema B/L  	Neuro: Awake however unable to obtain ROS              : Christian catheter  	Dialysis access: Right IJ tunneled HD catheter seen    LABS/STUDIES  --------------------------------------------------------------------------------              8.0    3.94  >-----------<  65       [09-10-24 @ 01:10]              25.0     145  |  106  |  66  ----------------------------<  103      [09-10-24 @ 01:10]  4.0   |  23  |  3.44        Ca     8.5     [09-10-24 @ 01:10]      Mg     2.50     [09-10-24 @ 01:10]      Phos  4.5     [09-10-24 @ 01:10]    TPro  6.0  /  Alb  2.6  /  TBili  0.5  /  DBili  x   /  AST  32  /  ALT  16  /  AlkPhos  103  [09-10-24 @ 01:10]    Creatinine Trend:  SCr 3.44 [09-10 @ 01:10]  SCr 4.48 [09-09 @ 01:45]  SCr 6.55 [09-08 @ 14:16]  SCr 6.61 [09-08 @ 03:10]  SCr 6.32 [09-07 @ 09:54]    HBsAb <3.0      [09-08-24 @ 19:49]  HBsAg Nonreact      [09-08-24 @ 19:49]  HBcAb Nonreact      [09-08-24 @ 19:49]  HCV 0.09, Nonreact      [09-08-24 @ 19:49]

## 2024-09-11 DIAGNOSIS — N39.0 URINARY TRACT INFECTION, SITE NOT SPECIFIED: ICD-10-CM

## 2024-09-11 DIAGNOSIS — E87.0 HYPEROSMOLALITY AND HYPERNATREMIA: ICD-10-CM

## 2024-09-11 DIAGNOSIS — G93.41 METABOLIC ENCEPHALOPATHY: ICD-10-CM

## 2024-09-11 DIAGNOSIS — N17.9 ACUTE KIDNEY FAILURE, UNSPECIFIED: ICD-10-CM

## 2024-09-11 LAB
A1C WITH ESTIMATED AVERAGE GLUCOSE RESULT: 6.3 % — HIGH (ref 4–5.6)
ALBUMIN SERPL ELPH-MCNC: 2.7 G/DL — LOW (ref 3.3–5)
ALP SERPL-CCNC: 102 U/L — SIGNIFICANT CHANGE UP (ref 40–120)
ALT FLD-CCNC: 20 U/L — SIGNIFICANT CHANGE UP (ref 4–33)
ANION GAP SERPL CALC-SCNC: 16 MMOL/L — HIGH (ref 7–14)
AST SERPL-CCNC: 36 U/L — HIGH (ref 4–32)
BASOPHILS # BLD AUTO: 0.02 K/UL — SIGNIFICANT CHANGE UP (ref 0–0.2)
BASOPHILS NFR BLD AUTO: 0.4 % — SIGNIFICANT CHANGE UP (ref 0–2)
BILIRUB SERPL-MCNC: 0.6 MG/DL — SIGNIFICANT CHANGE UP (ref 0.2–1.2)
BUN SERPL-MCNC: 78 MG/DL — HIGH (ref 7–23)
CALCIUM SERPL-MCNC: 8.9 MG/DL — SIGNIFICANT CHANGE UP (ref 8.4–10.5)
CHLORIDE SERPL-SCNC: 115 MMOL/L — HIGH (ref 98–107)
CO2 SERPL-SCNC: 22 MMOL/L — SIGNIFICANT CHANGE UP (ref 22–31)
CREAT SERPL-MCNC: 3.95 MG/DL — HIGH (ref 0.5–1.3)
EGFR: 12 ML/MIN/1.73M2 — LOW
EOSINOPHIL # BLD AUTO: 0.06 K/UL — SIGNIFICANT CHANGE UP (ref 0–0.5)
EOSINOPHIL NFR BLD AUTO: 1.3 % — SIGNIFICANT CHANGE UP (ref 0–6)
ESTIMATED AVERAGE GLUCOSE: 134 — SIGNIFICANT CHANGE UP
GLUCOSE BLDC GLUCOMTR-MCNC: 101 MG/DL — HIGH (ref 70–99)
GLUCOSE SERPL-MCNC: 115 MG/DL — HIGH (ref 70–99)
HCT VFR BLD CALC: 24.7 % — LOW (ref 34.5–45)
HGB BLD-MCNC: 7.6 G/DL — LOW (ref 11.5–15.5)
IANC: 2.62 K/UL — SIGNIFICANT CHANGE UP (ref 1.8–7.4)
IMM GRANULOCYTES NFR BLD AUTO: 0.9 % — SIGNIFICANT CHANGE UP (ref 0–0.9)
LYMPHOCYTES # BLD AUTO: 1.4 K/UL — SIGNIFICANT CHANGE UP (ref 1–3.3)
LYMPHOCYTES # BLD AUTO: 31 % — SIGNIFICANT CHANGE UP (ref 13–44)
MAGNESIUM SERPL-MCNC: 2.8 MG/DL — HIGH (ref 1.6–2.6)
MCHC RBC-ENTMCNC: 25.3 PG — LOW (ref 27–34)
MCHC RBC-ENTMCNC: 30.8 GM/DL — LOW (ref 32–36)
MCV RBC AUTO: 82.3 FL — SIGNIFICANT CHANGE UP (ref 80–100)
MONOCYTES # BLD AUTO: 0.37 K/UL — SIGNIFICANT CHANGE UP (ref 0–0.9)
MONOCYTES NFR BLD AUTO: 8.2 % — SIGNIFICANT CHANGE UP (ref 2–14)
NEUTROPHILS # BLD AUTO: 2.62 K/UL — SIGNIFICANT CHANGE UP (ref 1.8–7.4)
NEUTROPHILS NFR BLD AUTO: 58.2 % — SIGNIFICANT CHANGE UP (ref 43–77)
NRBC # BLD: 0 /100 WBCS — SIGNIFICANT CHANGE UP (ref 0–0)
NRBC # FLD: 0 K/UL — SIGNIFICANT CHANGE UP (ref 0–0)
PHOSPHATE SERPL-MCNC: 3.7 MG/DL — SIGNIFICANT CHANGE UP (ref 2.5–4.5)
PLATELET # BLD AUTO: 104 K/UL — LOW (ref 150–400)
POTASSIUM SERPL-MCNC: 4.3 MMOL/L — SIGNIFICANT CHANGE UP (ref 3.5–5.3)
POTASSIUM SERPL-SCNC: 4.3 MMOL/L — SIGNIFICANT CHANGE UP (ref 3.5–5.3)
PROT SERPL-MCNC: 6.1 G/DL — SIGNIFICANT CHANGE UP (ref 6–8.3)
RBC # BLD: 3 M/UL — LOW (ref 3.8–5.2)
RBC # FLD: 17.2 % — HIGH (ref 10.3–14.5)
SODIUM SERPL-SCNC: 153 MMOL/L — HIGH (ref 135–145)
WBC # BLD: 4.51 K/UL — SIGNIFICANT CHANGE UP (ref 3.8–10.5)
WBC # FLD AUTO: 4.51 K/UL — SIGNIFICANT CHANGE UP (ref 3.8–10.5)

## 2024-09-11 PROCEDURE — 99232 SBSQ HOSP IP/OBS MODERATE 35: CPT

## 2024-09-11 PROCEDURE — 99233 SBSQ HOSP IP/OBS HIGH 50: CPT | Mod: GC

## 2024-09-11 PROCEDURE — 99233 SBSQ HOSP IP/OBS HIGH 50: CPT

## 2024-09-11 RX ORDER — SODIUM CHLORIDE IRRIG SOLUTION 0.9 %
1000 SOLUTION, IRRIGATION IRRIGATION
Refills: 0 | Status: DISCONTINUED | OUTPATIENT
Start: 2024-09-11 | End: 2024-09-11

## 2024-09-11 RX ORDER — SODIUM CHLORIDE IRRIG SOLUTION 0.9 %
1000 SOLUTION, IRRIGATION IRRIGATION
Refills: 0 | Status: DISCONTINUED | OUTPATIENT
Start: 2024-09-11 | End: 2024-09-12

## 2024-09-11 RX ADMIN — SEVELAMER CARBONATE 1600 MILLIGRAM(S): 800 TABLET, FILM COATED ORAL at 12:29

## 2024-09-11 RX ADMIN — SEVELAMER CARBONATE 1600 MILLIGRAM(S): 800 TABLET, FILM COATED ORAL at 16:56

## 2024-09-11 RX ADMIN — Medication 5000 UNIT(S): at 22:19

## 2024-09-11 RX ADMIN — Medication 100 MILLILITER(S): at 12:28

## 2024-09-11 RX ADMIN — ERTAPENEM 100 MILLIGRAM(S): 1 INJECTION, POWDER, LYOPHILIZED, FOR SOLUTION INTRAMUSCULAR; INTRAVENOUS at 16:57

## 2024-09-11 RX ADMIN — Medication 5000 UNIT(S): at 12:30

## 2024-09-11 RX ADMIN — MUPIROCIN 1 APPLICATION(S): 20 OINTMENT TOPICAL at 16:57

## 2024-09-11 RX ADMIN — MUPIROCIN 1 APPLICATION(S): 20 OINTMENT TOPICAL at 05:04

## 2024-09-11 RX ADMIN — MULTI VITAMIN/MINERAL SUPPLEMENT WITH ASCORBIC ACID, NIACIN, PYRIDOXINE, PANTOTHENIC ACID, FOLIC ACID, RIBOFLAVIN, THIAMIN, BIOTIN, COBALAMIN AND ZINC. 1 TABLET(S): 60; 20; 12.5; 10; 10; 1.7; 1.5; 1; .3; .006 TABLET, COATED ORAL at 12:30

## 2024-09-11 RX ADMIN — Medication 5000 UNIT(S): at 05:04

## 2024-09-11 RX ADMIN — CHLORHEXIDINE GLUCONATE ORAL RINSE 1 APPLICATION(S): 1.2 SOLUTION DENTAL at 05:05

## 2024-09-11 NOTE — PROGRESS NOTE ADULT - SUBJECTIVE AND OBJECTIVE BOX
Follow Up:      Inverval History/ROS:Patient is a 58y old  Female who presents with a chief complaint of urgent HD (11 Sep 2024 09:35)    No fever  Altered    Allergies    No Known Allergies    Intolerances        ANTIMICROBIALS:  ertapenem  IVPB 500 every 24 hours      OTHER MEDS:  chlorhexidine 2% Cloths 1 Application(s) Topical <User Schedule>  dextrose 5%. 1000 milliLiter(s) IV Continuous <Continuous>  dextrose 5%. 1000 milliLiter(s) IV Continuous <Continuous>  dextrose 50% Injectable 25 Gram(s) IV Push once  dextrose 50% Injectable 12.5 Gram(s) IV Push once  dextrose 50% Injectable 25 Gram(s) IV Push once  dextrose Oral Gel 15 Gram(s) Oral once PRN  glucagon  Injectable 1 milliGRAM(s) IntraMuscular once  heparin   Injectable 5000 Unit(s) SubCutaneous every 8 hours  mupirocin 2% Ointment 1 Application(s) Topical two times a day  Nephro-johny 1 Tablet(s) Oral daily  sevelamer carbonate 1600 milliGRAM(s) Oral three times a day with meals  sodium chloride 0.45%. 1000 milliLiter(s) IV Continuous <Continuous>  sodium chloride 0.9% lock flush 10 milliLiter(s) IV Push every 1 hour PRN      Vital Signs Last 24 Hrs  T(C): 37 (11 Sep 2024 11:52), Max: 37.2 (10 Sep 2024 14:45)  T(F): 98.6 (11 Sep 2024 11:52), Max: 98.9 (10 Sep 2024 14:45)  HR: 90 (11 Sep 2024 11:52) (90 - 101)  BP: 162/62 (11 Sep 2024 11:52) (147/90 - 162/62)  BP(mean): --  RR: 18 (11 Sep 2024 11:52) (18 - 25)  SpO2: 97% (11 Sep 2024 11:52) (97% - 100%)    Parameters below as of 11 Sep 2024 11:52  Patient On (Oxygen Delivery Method): room air        PHYSICAL EXAM:  General: [ ] non-toxic  HEAD/EYES: [ ] PERRL [ x] white sclera [ ] icterus  ENT:  [ ] normal [x ] supple [ ] thrush [ ] pharyngeal exudate  Cardiovascular:   [ ] murmur [x ] normal [ ] PPM/AICD  Respiratory:  [x ] clear to ausculation bilaterally  GI:  [x ] soft, non-tender, normal bowel sounds  :  [ ] garcia [ ] no CVA tenderness   Musculoskeletal:  [ ] no synovitis  Neurologic:  [ ] non-focal exam   Skin:  [x] no rash  Lymph: [ ] no lymphadenopathy  Psychiatric:  [ ] appropriate affect [ ] alert & oriented  Lines:  [x ] no phlebitis [ ] central line                                7.6    4.51  )-----------( 104      ( 11 Sep 2024 06:40 )             24.7       09-11    153<H>  |  115<H>  |  78<H>  ----------------------------<  115<H>  4.3   |  22  |  3.95<H>    Ca    8.9      11 Sep 2024 06:40  Phos  3.7     09-11  Mg     2.80     09-11    TPro  6.1  /  Alb  2.7<L>  /  TBili  0.6  /  DBili  x   /  AST  36<H>  /  ALT  20  /  AlkPhos  102  09-11      Urinalysis Basic - ( 11 Sep 2024 06:40 )    Color: x / Appearance: x / SG: x / pH: x  Gluc: 115 mg/dL / Ketone: x  / Bili: x / Urobili: x   Blood: x / Protein: x / Nitrite: x   Leuk Esterase: x / RBC: x / WBC x   Sq Epi: x / Non Sq Epi: x / Bacteria: x        MICROBIOLOGY:Culture Results:   No growth at 72 Hours (09-07-24 @ 14:33)  Culture Results:   >100,000 CFU/ml Escherichia coli ESBL (09-05-24 @ 22:30)  Culture Results:   Growth in aerobic and anaerobic bottles: Escherichia coli ESBL  See previous culture [id number] (09-05-24 @ 22:07)  Culture Results:   Growth in aerobic and anaerobic bottles: Escherichia coli ESBL  Direct identification is available within approximately 3-5  hours either by Blood Panel Multiplexed PCR or Direct  MALDI-TOF. Details: [email or web address] (09-05-24 @ 20:46)      RADIOLOGY:

## 2024-09-11 NOTE — PROGRESS NOTE ADULT - PROBLEM SELECTOR PLAN 1
Pt. with LINA in setting of UTI/sepsis/bacteremia and recent IV contrast use. Pt with likely ATN. Pt. initiated on HD on 9/8/24 due to altered mental status and concern for uremia. Pt. received second HD treatment 9/9. Pt. non-oliguric with 850cc UOP in last 24 hrs. No plan for HD today. Will assess need for HD daily. Monitor labs and urine output. Avoid any potential nephrotoxins. Dose medications as per eGFR.     Doug Anderson, PGY4  Nephrology Fellow  MS TEAMS preferred  (After 4pm or on weekends, please contact the fellow on call). Pt. with LINA in setting of UTI/sepsis/bacteremia and recent IV contrast use. Pt with likely ATN. Pt. initiated on HD on 9/8/24 due to altered mental status and concern for uremia. Pt. received second HD treatment on 9/9.24. Scr elevated at 3.95 today. Pt. non-oliguric (~850 cc seen in urinary bag this morning). No plan for HD today. Will assess need for HD daily. Monitor labs and urine output. Avoid any potential nephrotoxins. Dose medications as per eGFR.

## 2024-09-11 NOTE — PROGRESS NOTE ADULT - PROBLEM SELECTOR PLAN 2
Pt. with hypernatremia in setting of impaired free water intake. Pt has 2.6L free water deficit. On admission, patient's serum sodium was 134 and has been increasing, today is 153. Recommend to continue with hypotonic IV fluids. Pt. with hypernatremia in setting of impaired free water intake. Pt has 2.6L free water deficit. On admission, patient's serum sodium was 134 and has been increasing, today is 153. Recommend to continue with hypotonic IV fluids @100cc/hr. Pt. with hypernatremia in setting of decreased free water intake. SNa increased to 153 today. Pt. currently on hypotonic IVFs. Monitor SNa.    Doug Anderson, PGY4  Nephrology Fellow  MS TEAMS preferred  (After 4pm or on weekends, please contact the fellow on call).

## 2024-09-11 NOTE — PROGRESS NOTE ADULT - PROBLEM SELECTOR PLAN 1
Cr 7.09 on admission with hyperkalemia  -likely ATN in setting of sepsis, also received IV contrast  -nephrology following, KAREY Fay in place for HD- has received HD on 9/8 and 9/9  -no indication for HD today  -monitor Cr daily  -avoid nephrotoxic agents

## 2024-09-11 NOTE — PROGRESS NOTE ADULT - ASSESSMENT
58 year old with RA  recent treatment for UTI- partially treated (stopped cipro course early)    Presented with fever  Associated Pyuria    Found to have ascending uti/pyelonephritis  Associated altered mental status    Transferred to ICU with hypoxia  Had LINA  Improved with HD     Urine culture with ESBL E coli  ESBL E coli bacteremia-   Repeat blod culture on 9/7 is without growth     Overall, ESBL E coli uti/ pyelonephritis  Altered mental status  LINA    Continue ertapenem (started 9/6) through 9/16  Monitor Mental status- may be multifactorial- infection/ LINA contributing. If not improving, neurology evaluation  Monitor for response to treatment  Remove mera when feasible    Case and plan DW ACP

## 2024-09-11 NOTE — CONSULT NOTE ADULT - SUBJECTIVE AND OBJECTIVE BOX
Neurology - Consult Note    -  Spectra: [phone] ([facility]), [phone] ([facility])  -    HPI: Patient TRACY DUARTE is a 58y (1965) Nepali speaking woman with a PMHx significant for HTN, HLD, rheumatoid arthritis on MTX, GERD who presented for generalized weakness, AMS, fever/chill for 3-4 days in setting of partially treated UTI. Found to have pyelonephritis 2/2 ESBL E coli c/b acute renal failure requiring MICU admission and hemodialysis on 8/8-8/9. Now downgraded to floor but continues to be confused and agitated despite undergoing dialysis. CT head 9/9 was unremarkable. Neurology is consulted for altered mental status.    Patient evaluated at the bedside with Nepali phone interpret ID #[id number]. Patient is awake, calm, alert but confused, unable to provide a history. No family at bedside. Unclear baseline functional status.    Review of Systems:  Unable to obtain    Allergies:  No Known Allergies      PMHx/PSHx/Family Hx: As above, otherwise see below   HTN (hypertension)      Social Hx: Unable to obtain    Medications:  MEDICATIONS  (STANDING):  chlorhexidine 2% Cloths 1 Application(s) Topical <User Schedule>  dextrose 5%. 1000 milliLiter(s) (50 mL/Hr) IV Continuous <Continuous>  dextrose 5%. 1000 milliLiter(s) (100 mL/Hr) IV Continuous <Continuous>  dextrose 50% Injectable 25 Gram(s) IV Push once  dextrose 50% Injectable 12.5 Gram(s) IV Push once  dextrose 50% Injectable 25 Gram(s) IV Push once  ertapenem  IVPB 500 milliGRAM(s) IV Intermittent every 24 hours  glucagon  Injectable 1 milliGRAM(s) IntraMuscular once  heparin   Injectable 5000 Unit(s) SubCutaneous every 8 hours  mupirocin 2% Ointment 1 Application(s) Topical two times a day  Nephro-johny 1 Tablet(s) Oral daily  sevelamer carbonate 1600 milliGRAM(s) Oral three times a day with meals  sodium chloride 0.45%. 1000 milliLiter(s) (100 mL/Hr) IV Continuous <Continuous>    MEDICATIONS  (PRN):  dextrose Oral Gel 15 Gram(s) Oral once PRN Blood Glucose LESS THAN 70 milliGRAM(s)/deciliter  sodium chloride 0.9% lock flush 10 milliLiter(s) IV Push every 1 hour PRN Pre/post blood products, medications, blood draw, and to maintain line patency      Vitals:  T(C): 37 (09-11-24 @ 11:52), Max: 37 (09-11-24 @ 11:52)  HR: 90 (09-11-24 @ 11:52) (90 - 100)  BP: 162/62 (09-11-24 @ 11:52) (147/90 - 162/62)  RR: 18 (09-11-24 @ 11:52) (18 - 22)  SpO2: 97% (09-11-24 @ 11:52) (97% - 100%)    Physical Examination:   General - No acute distress, calm, leaning to her left, in bilateral wrist restraints    Neurologic Exam:  Mental status - Awake and alert, able to state her full name and birthday, says she is in "an office", does not know the year. Patient attends to examiner but frequently gets distracted during the interview. Follows some simple commands. Hypophonia and perseverative speech, occasional mumbling.     Cranial nerves:  CN II: Blinks to threat in all visual fields. Pupils are 3 mm bilaterally and briskly reactive to light.   CN III, IV, VI: EOMI, no nystagmus, no ptosis  CN V: Grossly intact in V1-V3  CN VII: Face is symmetric   CN VII: Hearing is normal to conversation  CN IX, X: Unable to assess  CN XI: Unable to assess  CN XII: Unable to assess    Motor - Moves all extremities antigravity  Sensation - Withdraws to pain in all extremities  DTR's - 2+ throughout  Coordination - Unable to assess  Gait and station - Unable to assess    Labs:                        7.6    4.51  )-----------( 104      ( 11 Sep 2024 06:40 )             24.7     09-11    153<H>  |  115<H>  |  78<H>  ----------------------------<  115<H>  4.3   |  22  |  3.95<H>    Ca    8.9      11 Sep 2024 06:40  Phos  3.7     09-11  Mg     2.80     09-11    TPro  6.1  /  Alb  2.7<L>  /  TBili  0.6  /  DBili  x   /  AST  36<H>  /  ALT  20  /  AlkPhos  102  09-11    CAPILLARY BLOOD GLUCOSE      POCT Blood Glucose.: 101 mg/dL (11 Sep 2024 06:30)    LIVER FUNCTIONS - ( 11 Sep 2024 06:40 )  Alb: 2.7 g/dL / Pro: 6.1 g/dL / ALK PHOS: 102 U/L / ALT: 20 U/L / AST: 36 U/L / GGT: x             PTT - ( 10 Sep 2024 01:10 )  PTT:25.2 sec  Radiology:  CT Head No Cont:  (09 Sep 2024 16:41)    IMPRESSION: No acute intracranial hemorrhage, mass effect, or shift of   the midline structures.

## 2024-09-11 NOTE — PROGRESS NOTE ADULT - PROBLEM SELECTOR PLAN 2
Urine and blood cultures with ESBL E. coli  -CT imaging with pyelonephritis  -ID following, appreciate recs  -c/w ertapenem renally dosed, plan for 10 day course  -has Christian in place- will keep in for strict I/O at this time

## 2024-09-11 NOTE — PROGRESS NOTE ADULT - ATTENDING COMMENTS
Pt. with LINA, likely ATN. Pt. received HD on 9/8/24 and 9/9/24. Assessment and plan for LINA/HD and hypernatremia as outlined above. Monitor labs and urine output. Avoid any potential nephrotoxins. Dose medications as per eGFR. No plan for HD today. Will assess need for HD today.

## 2024-09-11 NOTE — SWALLOW BEDSIDE ASSESSMENT ADULT - SWALLOW EVAL: PROGNOSIS
(continued)  4. Functional pharyngeal stage for puree and moderately-thick liquids as characterized by suspected timely initiation of the pharyngeal swallow trigger and +hyolaryngeal excursion upon digital palpation. No overt s/sx aspiration/penetration evidenced post oral-intake PO trials puree and moderately-thick liquids.

## 2024-09-11 NOTE — PROGRESS NOTE ADULT - SUBJECTIVE AND OBJECTIVE BOX
Mony Moreno  [facility] Medicine  Pager #[phone]  Available on Microsoft Teams    Patient is a 58y old  Female who presents with a chief complaint of urgent HD (11 Sep 2024 13:07)      SUBJECTIVE / OVERNIGHT EVENTS: Patient seen and examined at bedside. Language line solutions  #[id number] used for Estonian translation. Patient oriented to name only. Unable to obtain ROS due to AMS.    ADDITIONAL REVIEW OF SYSTEMS:    MEDICATIONS  (STANDING):  chlorhexidine 2% Cloths 1 Application(s) Topical <User Schedule>  dextrose 5%. 1000 milliLiter(s) (50 mL/Hr) IV Continuous <Continuous>  dextrose 5%. 1000 milliLiter(s) (100 mL/Hr) IV Continuous <Continuous>  dextrose 50% Injectable 25 Gram(s) IV Push once  dextrose 50% Injectable 12.5 Gram(s) IV Push once  dextrose 50% Injectable 25 Gram(s) IV Push once  ertapenem  IVPB 500 milliGRAM(s) IV Intermittent every 24 hours  glucagon  Injectable 1 milliGRAM(s) IntraMuscular once  heparin   Injectable 5000 Unit(s) SubCutaneous every 8 hours  mupirocin 2% Ointment 1 Application(s) Topical two times a day  Nephro-johny 1 Tablet(s) Oral daily  sevelamer carbonate 1600 milliGRAM(s) Oral three times a day with meals  sodium chloride 0.45%. 1000 milliLiter(s) (100 mL/Hr) IV Continuous <Continuous>    MEDICATIONS  (PRN):  dextrose Oral Gel 15 Gram(s) Oral once PRN Blood Glucose LESS THAN 70 milliGRAM(s)/deciliter  sodium chloride 0.9% lock flush 10 milliLiter(s) IV Push every 1 hour PRN Pre/post blood products, medications, blood draw, and to maintain line patency      CAPILLARY BLOOD GLUCOSE      POCT Blood Glucose.: 101 mg/dL (11 Sep 2024 06:30)  POCT Blood Glucose.: 126 mg/dL (10 Sep 2024 23:28)    I&O's Summary    10 Sep 2024 07:01  -  11 Sep 2024 07:00  --------------------------------------------------------  IN: 200 mL / OUT: 390 mL / NET: -190 mL        PHYSICAL EXAM:    Vital Signs Last 24 Hrs  T(C): 37 (11 Sep 2024 11:52), Max: 37.2 (10 Sep 2024 14:45)  T(F): 98.6 (11 Sep 2024 11:52), Max: 98.9 (10 Sep 2024 14:45)  HR: 90 (11 Sep 2024 11:52) (90 - 100)  BP: 162/62 (11 Sep 2024 11:52) (147/90 - 162/62)  BP(mean): --  RR: 18 (11 Sep 2024 11:52) (18 - 22)  SpO2: 97% (11 Sep 2024 11:52) (97% - 100%)    Parameters below as of 11 Sep 2024 11:52  Patient On (Oxygen Delivery Method): room air    CONSTITUTIONAL: NAD, in b/l wrist restraints  EYES: Conjunctiva and sclera clear  ENMT: Dry oral mucosa  RESPIRATORY: Normal respiratory effort; lungs are clear to auscultation bilaterally  CARDIOVASCULAR: Regular rate and rhythm, normal S1 and S2, no murmur/rub/gallop  ABDOMEN: Soft, nontender to palpation, normoactive bowel sounds  PSYCH: A+O to person  NEUROLOGY: Moves all extremities   SKIN: No rashes; no palpable lesions    LABS:                        7.6    4.51  )-----------( 104      ( 11 Sep 2024 06:40 )             24.7     09-11    153<H>  |  115<H>  |  78<H>  ----------------------------<  115<H>  4.3   |  22  |  3.95<H>    Ca    8.9      11 Sep 2024 06:40  Phos  3.7     09-11  Mg     2.80     09-11    TPro  6.1  /  Alb  2.7<L>  /  TBili  0.6  /  DBili  x   /  AST  36<H>  /  ALT  20  /  AlkPhos  102  09-11    PTT - ( 10 Sep 2024 01:10 )  PTT:25.2 sec      Urinalysis Basic - ( 11 Sep 2024 06:40 )    Color: x / Appearance: x / SG: x / pH: x  Gluc: 115 mg/dL / Ketone: x  / Bili: x / Urobili: x   Blood: x / Protein: x / Nitrite: x   Leuk Esterase: x / RBC: x / WBC x   Sq Epi: x / Non Sq Epi: x / Bacteria: x          RADIOLOGY & ADDITIONAL TESTS: No new imaging  Results Reviewed: Yes  Imaging Personally Reviewed:  Electrocardiogram Personally Reviewed:    COORDINATION OF CARE:  Care Discussed with Consultants/Other Providers [Y/N]:  Prior or Outpatient Records Reviewed [Y/N]:

## 2024-09-11 NOTE — PROGRESS NOTE ADULT - ASSESSMENT
58 year old Serbian-speaking female with a history of RA on MTX, HTN, HLD initially admitted to MICU for sepsis with acute renal failure 2/2 ESBL. E. coli bacteremia and UTI. Hospital course c/b acute encephalopathy and dysphagia requiring NGT.

## 2024-09-11 NOTE — SWALLOW BEDSIDE ASSESSMENT ADULT - CONSISTENCIES ADMINISTERED
~1-2 ounces, each via teaspoon presentations and single cup sips/thin liquid/mildly thick 4 teaspoons/pureed 3 ounces via teaspoon presentations/moderately thick

## 2024-09-11 NOTE — SWALLOW BEDSIDE ASSESSMENT ADULT - SWALLOW EVAL: DIAGNOSIS
1. Moderate oral stage for puree and moderately-thick liquids as characterized by reduced oral grading (which improved as trials progressed), reduced oral stripping of bolus presentations from utensil with patient observed to generate intraoral pressure to suck/slurp presentations from spoon, slow anterior-posterior transport, and adequate oral clearance. 2. Mild oral stage for mildly-thick & thin liquids as characterized by adequate oral acceptance, reduced containment with trace anterior loss via cup sips, slow anterior-posterior transport with suspected premature spillage to the hypopharynx, and adequate oral clearance. 3. Moderate pharyngeal stage for mildly-thick & thin liquids as characterized by suspected delay in initiation of the pharyngeal swallow trigger and +hyolarygneal excursion upon digital palpation. Overt s/sx aspiration/penetration (immediate throat-clear) evidenced post-oral intake PO trials mildly-thick & thin liquids (continued in recommended consistencies).

## 2024-09-11 NOTE — SWALLOW BEDSIDE ASSESSMENT ADULT - COMMENTS
CXR 9/9: "IMPRESSION: Enteric tube courses below the diaphragm with its tip out of view. No focal consolidations."    CT Head 9/9: "IMPRESSION: No acute intracranial hemorrhage, mass effect, or shift of the midline structures." CXR 9/9: "IMPRESSION: Enteric tube courses below the diaphragm with its tip out of view. No focal consolidations."    CT Head 9/9: "IMPRESSION: No acute intracranial hemorrhage, mass effect, or shift of the midline structures."    Patient received resting reclined in bed, on room air and in no acute distress. Bilateral hand restraints in place. PCA present at bedside. Patient roused to eyes-open/alert state given verbal cues. Repositioned to upright in bed. Patient without attempt to make wants/needs known; Orally receptive to PO trials.

## 2024-09-11 NOTE — PROGRESS NOTE ADULT - SUBJECTIVE AND OBJECTIVE BOX
[facility] [facility] DIVISION OF KIDNEY DISEASES AND HYPERTENSION   FOLLOW UP NOTE    --------------------------------------------------------------------------------  Chief Complaint: LINA    24 hour events/subjective: Pt transferred to medicine floor from MICU yesterday. It was difficult to obtain ROS at bedside as pt was not responding to questions. Making good urine output.    PAST HISTORY  --------------------------------------------------------------------------------  No significant changes to PMH, PSH, FHx, SHx, unless otherwise noted    ALLERGIES & MEDICATIONS  --------------------------------------------------------------------------------  Allergies  No Known Allergies  Intolerances      Standing Inpatient Medications  chlorhexidine 2% Cloths 1 Application(s) Topical <User Schedule>  dextrose 5%. 1000 milliLiter(s) IV Continuous <Continuous>  dextrose 5%. 1000 milliLiter(s) IV Continuous <Continuous>  dextrose 50% Injectable 25 Gram(s) IV Push once  dextrose 50% Injectable 12.5 Gram(s) IV Push once  dextrose 50% Injectable 25 Gram(s) IV Push once  ertapenem  IVPB 500 milliGRAM(s) IV Intermittent every 24 hours  glucagon  Injectable 1 milliGRAM(s) IntraMuscular once  heparin   Injectable 5000 Unit(s) SubCutaneous every 8 hours  mupirocin 2% Ointment 1 Application(s) Topical two times a day  Nephro-johny 1 Tablet(s) Oral daily  sevelamer carbonate 1600 milliGRAM(s) Oral three times a day with meals  sodium chloride 0.45%. 1000 milliLiter(s) IV Continuous <Continuous>    PRN Inpatient Medications  dextrose Oral Gel 15 Gram(s) Oral once PRN  sodium chloride 0.9% lock flush 10 milliLiter(s) IV Push every 1 hour PRN      REVIEW OF SYSTEMS  --------------------------------------------------------------------------------  Unable to obtain due to clinical status.    VITALS/PHYSICAL EXAM  --------------------------------------------------------------------------------  T(C): 36.7 (09-11-24 @ 04:00), Max: 37.8 (09-10-24 @ 12:00)  HR: 92 (09-11-24 @ 04:00) (86 - 101)  BP: 157/71 (09-11-24 @ 04:00) (147/70 - 167/64)  RR: 18 (09-11-24 @ 04:00) (18 - 34)  SpO2: 98% (09-11-24 @ 04:00) (98% - 100%)  Wt(kg): --    09-10-24 @ 07:01  -  09-11-24 @ 07:00  --------------------------------------------------------  IN: 200 mL / OUT: 390 mL / NET: -190 mL    Physical Exam:              Gen: resting,   	HEENT: Anicteric  	Pulm: CTA B/L  	CV: S1S2+  	Abd: Soft, +BS   	Ext: LE trace edema B/L  	Neuro: Awake however unable to obtain ROS              : Christian catheter  	Dialysis access: Right IJ tunneled HD catheter seen    LABS/STUDIES  --------------------------------------------------------------------------------              8.0    3.94  >-----------<  65       [09-10-24 @ 01:10]              25.0     153  |  115  |  78  ----------------------------<  115      [09-11-24 @ 06:40]  4.3   |  22  |  3.95        Ca     8.9     [09-11-24 @ 06:40]      Mg     2.80     [09-11-24 @ 06:40]      Phos  3.7     [09-11-24 @ 06:40]    TPro  6.1  /  Alb  2.7  /  TBili  0.6  /  DBili  x   /  AST  36  /  ALT  20  /  AlkPhos  102  [09-11-24 @ 06:40]    PTT: 25.2       [09-10-24 @ 01:10]    Creatinine Trend:  SCr 3.95 [09-11 @ 06:40]  SCr 3.44 [09-10 @ 01:10]  SCr 4.48 [09-09 @ 01:45]  SCr 6.55 [09-08 @ 14:16]  SCr 6.61 [09-08 @ 03:10]    HBsAb <3.0      [09-08-24 @ 19:49]  HBsAg Nonreact      [09-08-24 @ 19:49]  HBcAb Nonreact      [09-08-24 @ 19:49]  HCV 0.09, Nonreact      [09-08-24 @ 19:49]       [facility] DIVISION OF KIDNEY DISEASES AND HYPERTENSION   FOLLOW UP NOTE    --------------------------------------------------------------------------------  Chief Complaint: LINA    24 hour events/subjective: [facility] Pt. awake, however unable to obtain ROS from pt.     PAST HISTORY  --------------------------------------------------------------------------------  No significant changes to PMH, PSH, FHx, SHx, unless otherwise noted    ALLERGIES & MEDICATIONS  --------------------------------------------------------------------------------  Allergies  No Known Allergies  Intolerances    Standing Inpatient Medications  chlorhexidine 2% Cloths 1 Application(s) Topical <User Schedule>  dextrose 5%. 1000 milliLiter(s) IV Continuous <Continuous>  dextrose 5%. 1000 milliLiter(s) IV Continuous <Continuous>  dextrose 50% Injectable 25 Gram(s) IV Push once  dextrose 50% Injectable 12.5 Gram(s) IV Push once  dextrose 50% Injectable 25 Gram(s) IV Push once  ertapenem  IVPB 500 milliGRAM(s) IV Intermittent every 24 hours  glucagon  Injectable 1 milliGRAM(s) IntraMuscular once  heparin   Injectable 5000 Unit(s) SubCutaneous every 8 hours  mupirocin 2% Ointment 1 Application(s) Topical two times a day  Nephro-johny 1 Tablet(s) Oral daily  sevelamer carbonate 1600 milliGRAM(s) Oral three times a day with meals  sodium chloride 0.45%. 1000 milliLiter(s) IV Continuous <Continuous>    PRN Inpatient Medications  dextrose Oral Gel 15 Gram(s) Oral once PRN  sodium chloride 0.9% lock flush 10 milliLiter(s) IV Push every 1 hour PRN    REVIEW OF SYSTEMS  --------------------------------------------------------------------------------  Unable to obtain ROS from pt.     VITALS/PHYSICAL EXAM  --------------------------------------------------------------------------------  T(C): 36.7 (09-11-24 @ 04:00), Max: 37.8 (09-10-24 @ 12:00)  HR: 92 (09-11-24 @ 04:00) (86 - 101)  BP: 157/71 (09-11-24 @ 04:00) (147/70 - 167/64)  RR: 18 (09-11-24 @ 04:00) (18 - 34)  SpO2: 98% (09-11-24 @ 04:00) (98% - 100%)  Wt(kg): --    09-10-24 @ 07:01  -  09-11-24 @ 07:00  --------------------------------------------------------  IN: 200 mL / OUT: 390 mL / NET: -190 mL    Physical Exam:              Gen: resting   	HEENT: Anicteric  	Pulm: Fair air entry B/L  	CV: S1S2+  	Abd: Soft, +BS   	Ext: LE trace edema B/L  	Neuro: Awake however unable to obtain ROS              : Christian catheter  	Dialysis access: Right IJ non-tunneled HD catheter    LABS/STUDIES  --------------------------------------------------------------------------------              8.0    3.94  >-----------<  65       [09-10-24 @ 01:10]              25.0     153  |  115  |  78  ----------------------------<  115      [09-11-24 @ 06:40]  4.3   |  22  |  3.95        Ca     8.9     [09-11-24 @ 06:40]      Mg     2.80     [09-11-24 @ 06:40]      Phos  3.7     [09-11-24 @ 06:40]    TPro  6.1  /  Alb  2.7  /  TBili  0.6  /  DBili  x   /  AST  36  /  ALT  20  /  AlkPhos  102  [09-11-24 @ 06:40]    Creatinine Trend:  SCr 3.95 [09-11 @ 06:40]  SCr 3.44 [09-10 @ 01:10]  SCr 4.48 [09-09 @ 01:45]  SCr 6.55 [09-08 @ 14:16]  SCr 6.61 [09-08 @ 03:10]    HBsAb <3.0      [09-08-24 @ 19:49]  HBsAg Nonreact      [09-08-24 @ 19:49]  HBcAb Nonreact      [09-08-24 @ 19:49]  HCV 0.09, Nonreact      [09-08-24 @ 19:49]

## 2024-09-11 NOTE — SWALLOW BEDSIDE ASSESSMENT ADULT - ORAL PREPARATORY PHASE
Anterior loss of bolus Reduced oral stripping of bolus from utensil, with patient observed to generate intraoral pressure to suck/slurp moderately-thick liquids from spoon./Reduced oral grading Reduced oral stripping of bolus from utensil, with patient observed to generate intraoral pressure to suck/slurp puree from spoon./Reduced oral grading

## 2024-09-11 NOTE — CONSULT NOTE ADULT - ASSESSMENT
58F PMHx HTN, HLD, RA on MTX admitted for bacteremia and acute renal failure, now s/p 2 sessions of HD. Neurology is consulted for AMS. On exam, patient is calm, alert and oriented to self only, knows her birthday, some mumbling and perseverative speech. No focal neurological deficits.   CT head was unremarkable. Labs reviewed noted for anemia, hypernatremia, uremia 78, creatinine 3.95.    Impression: Toxic metabolic encephalopathy 2/2 uremia and bacteremia    Recommendations:  [] TSH, T3/T4, Folate, B12, homocysteine, methylmalonic acid, syphilis screen, Vitamin B1, B6, lactate, CPK, ammonia, copper, ceruloplasmin, SPEP, HIV, RPR, Lyme IgG/M, ESR, CRP, zinc, iron, COVID spike and nucleocapsid Ab  [] Rest of management per primary team 58F PMHx HTN, HLD, RA on MTX admitted for bacteremia and acute renal failure, now s/p 2 sessions of HD. Neurology is consulted for AMS. On exam, patient is calm, alert and oriented to self only, knows her birthday, some mumbling and perseverative speech. No focal neurological deficits.   CT head was unremarkable. Labs reviewed noted for anemia, hypernatremia, uremia 78, creatinine 3.95.    Impression: Toxic metabolic encephalopathy 2/2 uremia and bacteremia    Recommendations:  [] TSH, T4, Folate, B12, homocysteine, methylmalonic acid, Vitamin B1, B6, lactate, CPK, ammonia, HIV, RPR, zinc, iron  [] Rest of management as per primary team    To be seen on AM rounds. Case and plan not finalized until attending attestation.

## 2024-09-11 NOTE — CONSULT NOTE ADULT - ATTENDING COMMENTS
58 year old with RA  recent treatment for UTI- partially treated (stopped cipro course early)    Presented with fever  Associated Pyuria    Found to have ascending uti/pyelonephritis  Associated altered mental status    Transferred to ICU with hypoxia  Had LINA  Improved with HD     Urine culture with ESBL E coli    Overall, ESBL E coli uti/ pyelonephritis  Altered mental status  LINA    Continue ertapenem (started 9/6) - Fever can persists for a few days with pyelonephritis  Monitor Mental status- may be multifactorial- infection/ LINA contributing  Monitor for response to treatment
LINA  ATN  Hyperkalemia  Metabolic acidosis    Plan per fellows note above  Bicarb gtt  Monitor labs and Is/Os  Will assess for RRT needs
58F PMHx HTN, HLD, RA on MTX admitted for bacteremia and acute renal failure, now s/p 2 sessions of HD.   on exam is aaox1, appears to be near baseline per son at bedside. Does not usually know where she is. mild asterixis  CTH neg for acute pathology  Suspect TME in the setting of hypernatremia, ARF, bacteremia  If mental status does not improve can check MRI w/o and eeg   Avoid neurotoxic abx  delirium precautions   would also check tte in the setting of bacteremia

## 2024-09-12 DIAGNOSIS — I10 ESSENTIAL (PRIMARY) HYPERTENSION: ICD-10-CM

## 2024-09-12 LAB
ADD ON TEST-SPECIMEN IN LAB: SIGNIFICANT CHANGE UP
ALBUMIN SERPL ELPH-MCNC: 3.1 G/DL — LOW (ref 3.3–5)
ALP SERPL-CCNC: 102 U/L — SIGNIFICANT CHANGE UP (ref 40–120)
ALT FLD-CCNC: 21 U/L — SIGNIFICANT CHANGE UP (ref 4–33)
AMMONIA BLD-MCNC: 20 UMOL/L — SIGNIFICANT CHANGE UP (ref 11–55)
ANION GAP SERPL CALC-SCNC: 16 MMOL/L — HIGH (ref 7–14)
AST SERPL-CCNC: 37 U/L — HIGH (ref 4–32)
BASOPHILS # BLD AUTO: 0.02 K/UL — SIGNIFICANT CHANGE UP (ref 0–0.2)
BASOPHILS NFR BLD AUTO: 0.4 % — SIGNIFICANT CHANGE UP (ref 0–2)
BILIRUB SERPL-MCNC: 0.7 MG/DL — SIGNIFICANT CHANGE UP (ref 0.2–1.2)
BUN SERPL-MCNC: 84 MG/DL — HIGH (ref 7–23)
CALCIUM SERPL-MCNC: 9 MG/DL — SIGNIFICANT CHANGE UP (ref 8.4–10.5)
CHLORIDE SERPL-SCNC: 121 MMOL/L — HIGH (ref 98–107)
CK SERPL-CCNC: 22 U/L — LOW (ref 25–170)
CO2 SERPL-SCNC: 18 MMOL/L — LOW (ref 22–31)
CREAT SERPL-MCNC: 3.78 MG/DL — HIGH (ref 0.5–1.3)
CULTURE RESULTS: SIGNIFICANT CHANGE UP
EGFR: 13 ML/MIN/1.73M2 — LOW
EOSINOPHIL # BLD AUTO: 0.09 K/UL — SIGNIFICANT CHANGE UP (ref 0–0.5)
EOSINOPHIL NFR BLD AUTO: 1.6 % — SIGNIFICANT CHANGE UP (ref 0–6)
GLUCOSE SERPL-MCNC: 119 MG/DL — HIGH (ref 70–99)
HCT VFR BLD CALC: 25.5 % — LOW (ref 34.5–45)
HGB BLD-MCNC: 7.6 G/DL — LOW (ref 11.5–15.5)
HIV 1+2 AB+HIV1 P24 AG SERPL QL IA: SIGNIFICANT CHANGE UP
IANC: 2.76 K/UL — SIGNIFICANT CHANGE UP (ref 1.8–7.4)
IMM GRANULOCYTES NFR BLD AUTO: 1.2 % — HIGH (ref 0–0.9)
IRON SATN MFR SERPL: 86 UG/DL — SIGNIFICANT CHANGE UP (ref 30–160)
LACTATE SERPL-SCNC: 0.8 MMOL/L — SIGNIFICANT CHANGE UP (ref 0.5–2)
LYMPHOCYTES # BLD AUTO: 2.21 K/UL — SIGNIFICANT CHANGE UP (ref 1–3.3)
LYMPHOCYTES # BLD AUTO: 39 % — SIGNIFICANT CHANGE UP (ref 13–44)
MAGNESIUM SERPL-MCNC: 2.9 MG/DL — HIGH (ref 1.6–2.6)
MCHC RBC-ENTMCNC: 25.4 PG — LOW (ref 27–34)
MCHC RBC-ENTMCNC: 29.8 GM/DL — LOW (ref 32–36)
MCV RBC AUTO: 85.3 FL — SIGNIFICANT CHANGE UP (ref 80–100)
MONOCYTES # BLD AUTO: 0.52 K/UL — SIGNIFICANT CHANGE UP (ref 0–0.9)
MONOCYTES NFR BLD AUTO: 9.2 % — SIGNIFICANT CHANGE UP (ref 2–14)
NEUTROPHILS # BLD AUTO: 2.76 K/UL — SIGNIFICANT CHANGE UP (ref 1.8–7.4)
NEUTROPHILS NFR BLD AUTO: 48.6 % — SIGNIFICANT CHANGE UP (ref 43–77)
NRBC # BLD: 0 /100 WBCS — SIGNIFICANT CHANGE UP (ref 0–0)
NRBC # FLD: 0 K/UL — SIGNIFICANT CHANGE UP (ref 0–0)
PHOSPHATE SERPL-MCNC: 3.6 MG/DL — SIGNIFICANT CHANGE UP (ref 2.5–4.5)
PLATELET # BLD AUTO: 151 K/UL — SIGNIFICANT CHANGE UP (ref 150–400)
POTASSIUM SERPL-MCNC: 4.4 MMOL/L — SIGNIFICANT CHANGE UP (ref 3.5–5.3)
POTASSIUM SERPL-SCNC: 4.4 MMOL/L — SIGNIFICANT CHANGE UP (ref 3.5–5.3)
PROT SERPL-MCNC: 6.9 G/DL — SIGNIFICANT CHANGE UP (ref 6–8.3)
RBC # BLD: 2.99 M/UL — LOW (ref 3.8–5.2)
RBC # FLD: 18 % — HIGH (ref 10.3–14.5)
SODIUM SERPL-SCNC: 155 MMOL/L — HIGH (ref 135–145)
SPECIMEN SOURCE: SIGNIFICANT CHANGE UP
T PALLIDUM AB TITR SER: NEGATIVE — SIGNIFICANT CHANGE UP
T3 SERPL-MCNC: 57 NG/DL — LOW (ref 80–200)
T4 AB SER-ACNC: 3.81 UG/DL — LOW (ref 5.1–13)
T4 FREE SERPL-MCNC: 0.8 NG/DL — LOW (ref 0.9–1.8)
TSH SERPL-MCNC: 6.55 UIU/ML — HIGH (ref 0.27–4.2)
WBC # BLD: 5.67 K/UL — SIGNIFICANT CHANGE UP (ref 3.8–10.5)
WBC # FLD AUTO: 5.67 K/UL — SIGNIFICANT CHANGE UP (ref 3.8–10.5)

## 2024-09-12 PROCEDURE — 99232 SBSQ HOSP IP/OBS MODERATE 35: CPT

## 2024-09-12 PROCEDURE — 99233 SBSQ HOSP IP/OBS HIGH 50: CPT

## 2024-09-12 PROCEDURE — 99232 SBSQ HOSP IP/OBS MODERATE 35: CPT | Mod: GC

## 2024-09-12 RX ORDER — AMLODIPINE BESYLATE 5 MG
5 TABLET ORAL DAILY
Refills: 0 | Status: DISCONTINUED | OUTPATIENT
Start: 2024-09-12 | End: 2024-09-22

## 2024-09-12 RX ORDER — SEVELAMER CARBONATE 800 MG/1
800 TABLET, FILM COATED ORAL
Refills: 0 | Status: DISCONTINUED | OUTPATIENT
Start: 2024-09-12 | End: 2024-09-18

## 2024-09-12 RX ORDER — SODIUM CHLORIDE IRRIG SOLUTION 0.9 %
1000 SOLUTION, IRRIGATION IRRIGATION
Refills: 0 | Status: DISCONTINUED | OUTPATIENT
Start: 2024-09-12 | End: 2024-09-15

## 2024-09-12 RX ADMIN — SEVELAMER CARBONATE 800 MILLIGRAM(S): 800 TABLET, FILM COATED ORAL at 17:12

## 2024-09-12 RX ADMIN — CHLORHEXIDINE GLUCONATE ORAL RINSE 1 APPLICATION(S): 1.2 SOLUTION DENTAL at 05:32

## 2024-09-12 RX ADMIN — SEVELAMER CARBONATE 800 MILLIGRAM(S): 800 TABLET, FILM COATED ORAL at 12:08

## 2024-09-12 RX ADMIN — SEVELAMER CARBONATE 1600 MILLIGRAM(S): 800 TABLET, FILM COATED ORAL at 08:42

## 2024-09-12 RX ADMIN — Medication 100 MILLILITER(S): at 21:49

## 2024-09-12 RX ADMIN — Medication 5000 UNIT(S): at 21:49

## 2024-09-12 RX ADMIN — Medication 5000 UNIT(S): at 12:08

## 2024-09-12 RX ADMIN — MUPIROCIN 1 APPLICATION(S): 20 OINTMENT TOPICAL at 05:28

## 2024-09-12 RX ADMIN — MULTI VITAMIN/MINERAL SUPPLEMENT WITH ASCORBIC ACID, NIACIN, PYRIDOXINE, PANTOTHENIC ACID, FOLIC ACID, RIBOFLAVIN, THIAMIN, BIOTIN, COBALAMIN AND ZINC. 1 TABLET(S): 60; 20; 12.5; 10; 10; 1.7; 1.5; 1; .3; .006 TABLET, COATED ORAL at 12:08

## 2024-09-12 RX ADMIN — Medication 100 MILLILITER(S): at 09:36

## 2024-09-12 RX ADMIN — Medication 5000 UNIT(S): at 05:27

## 2024-09-12 RX ADMIN — MUPIROCIN 1 APPLICATION(S): 20 OINTMENT TOPICAL at 17:19

## 2024-09-12 RX ADMIN — Medication 5 MILLIGRAM(S): at 10:08

## 2024-09-12 RX ADMIN — ERTAPENEM 100 MILLIGRAM(S): 1 INJECTION, POWDER, LYOPHILIZED, FOR SOLUTION INTRAMUSCULAR; INTRAVENOUS at 17:12

## 2024-09-12 NOTE — PROGRESS NOTE ADULT - SUBJECTIVE AND OBJECTIVE BOX
Follow Up:      Inverval History/ROS:Patient is a 58y old  Female who presents with a chief complaint of urgent HD (12 Sep 2024 14:58)    No fever  Agitated but more alert    Allergies    No Known Allergies    Intolerances        ANTIMICROBIALS:  ertapenem  IVPB 500 every 24 hours      OTHER MEDS:  amLODIPine   Tablet 5 milliGRAM(s) Oral daily  chlorhexidine 2% Cloths 1 Application(s) Topical <User Schedule>  dextrose 5%. 1000 milliLiter(s) IV Continuous <Continuous>  dextrose 5%. 1000 milliLiter(s) IV Continuous <Continuous>  dextrose 5%. 1000 milliLiter(s) IV Continuous <Continuous>  dextrose 50% Injectable 25 Gram(s) IV Push once  dextrose 50% Injectable 12.5 Gram(s) IV Push once  dextrose 50% Injectable 25 Gram(s) IV Push once  dextrose Oral Gel 15 Gram(s) Oral once PRN  glucagon  Injectable 1 milliGRAM(s) IntraMuscular once  heparin   Injectable 5000 Unit(s) SubCutaneous every 8 hours  levothyroxine 25 MICROGram(s) Oral daily  mupirocin 2% Ointment 1 Application(s) Topical two times a day  Nephro-johny 1 Tablet(s) Oral daily  sevelamer carbonate 800 milliGRAM(s) Oral three times a day with meals  sodium chloride 0.9% lock flush 10 milliLiter(s) IV Push every 1 hour PRN      Vital Signs Last 24 Hrs  T(C): 36.9 (12 Sep 2024 11:45), Max: 36.9 (12 Sep 2024 11:45)  T(F): 98.4 (12 Sep 2024 11:45), Max: 98.4 (12 Sep 2024 11:45)  HR: 96 (12 Sep 2024 11:45) (87 - 96)  BP: 149/70 (12 Sep 2024 11:45) (149/70 - 173/89)  BP(mean): --  RR: 18 (12 Sep 2024 11:45) (18 - 18)  SpO2: 97% (12 Sep 2024 11:45) (97% - 100%)    Parameters below as of 12 Sep 2024 11:45  Patient On (Oxygen Delivery Method): room air        PHYSICAL EXAM:  General: [ ] non-toxic  HEAD/EYES: [ ] PERRL [x ] white sclera [ ] icterus  ENT:  [ ] normal [x ] supple [ ] thrush [ ] pharyngeal exudate  Cardiovascular:   [ ] murmur [x ] normal [ ] PPM/AICD  Respiratory:  x[ ] clear to ausculation bilaterally  GI:  [ ] soft, non-tender, normal bowel sounds  :  [ ] garcia [ ] no CVA tenderness   Musculoskeletal:  [ ] no synovitis  Neurologic:  [ ] non-focal exam   Skin:  [x ] no rash  Lymph: [x ] no lymphadenopathy  Psychiatric:  [ ] appropriate affect [ ] alert & oriented  Lines:  [x ] no phlebitis [ ] central line                                7.6    5.67  )-----------( 151      ( 12 Sep 2024 07:00 )             25.5       09-12    155<H>  |  121<H>  |  84<H>  ----------------------------<  119<H>  4.4   |  18<L>  |  3.78<H>    Ca    9.0      12 Sep 2024 07:00  Phos  3.6     09-12  Mg     2.90     09-12    TPro  6.9  /  Alb  3.1<L>  /  TBili  0.7  /  DBili  x   /  AST  37<H>  /  ALT  21  /  AlkPhos  102  09-12      Urinalysis Basic - ( 12 Sep 2024 07:00 )    Color: x / Appearance: x / SG: x / pH: x  Gluc: 119 mg/dL / Ketone: x  / Bili: x / Urobili: x   Blood: x / Protein: x / Nitrite: x   Leuk Esterase: x / RBC: x / WBC x   Sq Epi: x / Non Sq Epi: x / Bacteria: x        MICROBIOLOGY:Culture Results:   No growth at 5 days (09-07-24 @ 14:33)  Culture Results:   >100,000 CFU/ml Escherichia coli ESBL (09-05-24 @ 22:30)      RADIOLOGY:

## 2024-09-12 NOTE — PROGRESS NOTE ADULT - SUBJECTIVE AND OBJECTIVE BOX
[facility] DIVISION OF KIDNEY DISEASES AND HYPERTENSION   FOLLOW UP NOTE    --------------------------------------------------------------------------------  Chief Complaint: LINA    24 hour events/subjective: Pt. was seen and examined today. Pt is more alert and awake this morning, however unable to obtain ROS from pt.     PAST HISTORY  --------------------------------------------------------------------------------  No significant changes to PMH, PSH, FHx, SHx, unless otherwise noted    ALLERGIES & MEDICATIONS  --------------------------------------------------------------------------------  Allergies  No Known Allergies  Intolerances    Standing Inpatient Medications  chlorhexidine 2% Cloths 1 Application(s) Topical <User Schedule>  dextrose 5%. 1000 milliLiter(s) IV Continuous <Continuous>  dextrose 5%. 1000 milliLiter(s) IV Continuous <Continuous>  dextrose 50% Injectable 25 Gram(s) IV Push once  dextrose 50% Injectable 12.5 Gram(s) IV Push once  dextrose 50% Injectable 25 Gram(s) IV Push once  ertapenem  IVPB 500 milliGRAM(s) IV Intermittent every 24 hours  glucagon  Injectable 1 milliGRAM(s) IntraMuscular once  heparin   Injectable 5000 Unit(s) SubCutaneous every 8 hours  mupirocin 2% Ointment 1 Application(s) Topical two times a day  Nephro-johny 1 Tablet(s) Oral daily  sevelamer carbonate 1600 milliGRAM(s) Oral three times a day with meals  sodium chloride 0.45%. 1000 milliLiter(s) IV Continuous <Continuous>    PRN Inpatient Medications  dextrose Oral Gel 15 Gram(s) Oral once PRN  sodium chloride 0.9% lock flush 10 milliLiter(s) IV Push every 1 hour PRN    REVIEW OF SYSTEMS  --------------------------------------------------------------------------------  Unable to obtain from patient.     VITALS/PHYSICAL EXAM  --------------------------------------------------------------------------------  T(C): 36.3 (09-12-24 @ 06:12), Max: 37 (09-11-24 @ 11:52)  HR: 87 (09-12-24 @ 06:12) (87 - 90)  BP: 173/89 (09-12-24 @ 06:12) (162/62 - 173/89)  RR: 18 (09-12-24 @ 06:12) (18 - 18)  SpO2: 100% (09-12-24 @ 06:12) (97% - 100%)  Wt(kg): --    09-11-24 @ 07:01  -  09-12-24 @ 07:00  --------------------------------------------------------  IN: 0 mL / OUT: 2600 mL / NET: -2600 mL    Physical Exam:              Gen: resting   	HEENT: Anicteric  	Pulm: Fair air entry B/L  	CV: S1S2+  	Abd: Soft, +BS   	Ext: LE trace edema B/L  	Neuro: Awake however unable to obtain ROS              : Christian catheter  	Dialysis access: Right IJ non-tunneled HD catheter      LABS/STUDIES  --------------------------------------------------------------------------------              7.6    5.67  >-----------<  151      [09-12-24 @ 07:00]              25.5     155  |  121  |  84  ----------------------------<  119      [09-12-24 @ 07:00]  4.4   |  18  |  3.78        Ca     9.0     [09-12-24 @ 07:00]      Mg     2.90     [09-12-24 @ 07:00]      Phos  3.6     [09-12-24 @ 07:00]    TPro  6.9  /  Alb  3.1  /  TBili  0.7  /  DBili  x   /  AST  37  /  ALT  21  /  AlkPhos  102  [09-12-24 @ 07:00]    Creatinine Trend:  SCr 3.78 [09-12 @ 07:00]  SCr 3.95 [09-11 @ 06:40]  SCr 3.44 [09-10 @ 01:10]  SCr 4.48 [09-09 @ 01:45]  SCr 6.55 [09-08 @ 14:16]    HBsAb <3.0      [09-08-24 @ 19:49]  HBsAg Nonreact      [09-08-24 @ 19:49]  HBcAb Nonreact      [09-08-24 @ 19:49]  HCV 0.09, Nonreact      [09-08-24 @ 19:49]  HIV Nonreact      [09-12-24 @ 07:00]     [facility] DIVISION OF KIDNEY DISEASES AND HYPERTENSION   FOLLOW UP NOTE    --------------------------------------------------------------------------------  Chief Complaint: LINA, Hypernatremia    24 hour events/subjective: Pt. was seen and examined today. Pt. more alert and awake this morning, however unable to obtain ROS from pt.     PAST HISTORY  --------------------------------------------------------------------------------  No significant changes to PMH, PSH, FHx, SHx, unless otherwise noted    ALLERGIES & MEDICATIONS  --------------------------------------------------------------------------------  Allergies  No Known Allergies  Intolerances    Standing Inpatient Medications  chlorhexidine 2% Cloths 1 Application(s) Topical <User Schedule>  dextrose 5%. 1000 milliLiter(s) IV Continuous <Continuous>  dextrose 5%. 1000 milliLiter(s) IV Continuous <Continuous>  dextrose 50% Injectable 25 Gram(s) IV Push once  dextrose 50% Injectable 12.5 Gram(s) IV Push once  dextrose 50% Injectable 25 Gram(s) IV Push once  ertapenem  IVPB 500 milliGRAM(s) IV Intermittent every 24 hours  glucagon  Injectable 1 milliGRAM(s) IntraMuscular once  heparin   Injectable 5000 Unit(s) SubCutaneous every 8 hours  mupirocin 2% Ointment 1 Application(s) Topical two times a day  Nephro-johny 1 Tablet(s) Oral daily  sevelamer carbonate 1600 milliGRAM(s) Oral three times a day with meals  sodium chloride 0.45%. 1000 milliLiter(s) IV Continuous <Continuous>    PRN Inpatient Medications  dextrose Oral Gel 15 Gram(s) Oral once PRN  sodium chloride 0.9% lock flush 10 milliLiter(s) IV Push every 1 hour PRN    REVIEW OF SYSTEMS  --------------------------------------------------------------------------------  Unable to obtain from patient.     VITALS/PHYSICAL EXAM  --------------------------------------------------------------------------------  T(C): 36.3 (09-12-24 @ 06:12), Max: 37 (09-11-24 @ 11:52)  HR: 87 (09-12-24 @ 06:12) (87 - 90)  BP: 173/89 (09-12-24 @ 06:12) (162/62 - 173/89)  RR: 18 (09-12-24 @ 06:12) (18 - 18)  SpO2: 100% (09-12-24 @ 06:12) (97% - 100%)  Wt(kg): --    09-11-24 @ 07:01  -  09-12-24 @ 07:00  --------------------------------------------------------  IN: 0 mL / OUT: 2600 mL / NET: -2600 mL    Physical Exam:              Gen: resting   	HEENT: Anicteric  	Pulm: Fair air entry B/L  	CV: S1S2+  	Abd: Soft, +BS   	Ext: LE trace edema B/L  	Neuro: Awake, alert              : +Christian catheter  	Dialysis access: Right IJ non-tunneled HD catheter    LABS/STUDIES  --------------------------------------------------------------------------------              7.6    5.67  >-----------<  151      [09-12-24 @ 07:00]              25.5     155  |  121  |  84  ----------------------------<  119      [09-12-24 @ 07:00]  4.4   |  18  |  3.78        Ca     9.0     [09-12-24 @ 07:00]      Mg     2.90     [09-12-24 @ 07:00]      Phos  3.6     [09-12-24 @ 07:00]    TPro  6.9  /  Alb  3.1  /  TBili  0.7  /  DBili  x   /  AST  37  /  ALT  21  /  AlkPhos  102  [09-12-24 @ 07:00]    Creatinine Trend:  SCr 3.78 [09-12 @ 07:00]  SCr 3.95 [09-11 @ 06:40]  SCr 3.44 [09-10 @ 01:10]  SCr 4.48 [09-09 @ 01:45]  SCr 6.55 [09-08 @ 14:16]    HBsAb <3.0      [09-08-24 @ 19:49]  HBsAg Nonreact      [09-08-24 @ 19:49]  HBcAb Nonreact      [09-08-24 @ 19:49]  HCV 0.09, Nonreact      [09-08-24 @ 19:49]  HIV Nonreact      [09-12-24 @ 07:00]

## 2024-09-12 NOTE — PROGRESS NOTE ADULT - PROBLEM SELECTOR PLAN 1
Pt. with LINA in setting of UTI/sepsis/bacteremia and recent IV contrast use. Pt with likely ATN. On admission, Scr-7.02. Pt. initiated on HD on 9/8/24 due to altered mental status and concern for uremia. Pt. received second HD treatment on 9/9/24. Scr improved to 3.78 today. Pt. non-oliguric with 1.5L UO in 24 hrs. No plan for HD today, remove HD catheter today. Pt with noted metabolic acidosis today with HCO3 at 18. Monitor labs and urine output. Avoid any potential nephrotoxins. Dose medications as per eGFR. Pt. with LINA in setting of UTI/sepsis/bacteremia and recent IV contrast use. Pt with likely ATN. On admission, Scr was elevated at 7.02. Pt. initiated on HD on 9/8/24 due to altered mental status and concern for uremia. Pt. received second HD treatment on 9/9/24. Scr improved to 3.78 today. Pt. non-oliguric with ~1.5 L UOP in last 24 hrs. No current plan for HD, recommend to remove HD catheter today. Monitor labs and urine output. Avoid any potential nephrotoxins. Dose medications as per eGFR.

## 2024-09-12 NOTE — PROGRESS NOTE ADULT - PROBLEM SELECTOR PLAN 2
Pt. with hypernatremia in setting of decreased free water intake. SNa increased to 155 today. Pt. currently on hypotonic IVFs, recommend to change to D5W @100. Monitor SNa.    Doug Anderson, PGY4  Nephrology Fellow  MS TEAMS preferred  (After 4pm or on weekends, please contact the fellow on call). Pt. with hypernatremia in setting of decreased free water intake. SNa increased to 155 today. Pt. currently on hypotonic IVFs, recommend to change IVFs to D5W @100 cc/hr. Monitor SNa.    Doug Anderson, PGY4  Nephrology Fellow  MS TEAMS preferred  (After 4pm or on weekends, please contact the fellow on call).

## 2024-09-12 NOTE — PROGRESS NOTE ADULT - ASSESSMENT
58 year old Turkish-speaking female with a history of RA on MTX, HTN, HLD initially admitted to MICU for sepsis with acute renal failure 2/2 ESBL. E. coli bacteremia and UTI. Hospital course c/b acute encephalopathy and dysphagia requiring NGT.

## 2024-09-12 NOTE — PROGRESS NOTE ADULT - PROBLEM SELECTOR PLAN 2
Urine and blood cultures with ESBL E. coli  -CT abdomen/pelvis with pyelonephritis  -ID following, appreciate recs  -c/w ertapenem renally dosed, plan for 10 day course

## 2024-09-12 NOTE — PROGRESS NOTE ADULT - ASSESSMENT
58 year old with RA  recent treatment for UTI- partially treated (stopped cipro course early)    Presented with fever  Associated Pyuria    Found to have ascending uti/pyelonephritis  Associated altered mental status    Transferred to ICU with hypoxia  Had LINA  Improved with HD     Urine culture with ESBL E coli  ESBL E coli bacteremia-   Repeat blod culture on 9/7 is without growth     Overall, ESBL E coli uti/ pyelonephritis  Altered mental status  LINA    Continue ertapenem (started 9/6) through 9/16  Monitor Mental status- may be multifactorial- infection/ LINA contributing.   Monitor for response to treatment    Call ID service with additional questions  Will sign off

## 2024-09-12 NOTE — PROGRESS NOTE ADULT - ATTENDING COMMENTS
Pt. with LINA, likely ATN. Pt. received HD on 9/8/24 and 9/9/24. Assessment and plan for LINA/HD and hypernatremia as outlined above. Scr decreased to 3.78 today. Monitor labs and urine output. Avoid any potential nephrotoxins. Dose medications as per eGFR. No current plan for HD, recommend to remove HD catheter today.

## 2024-09-12 NOTE — CHART NOTE - NSCHARTNOTEFT_GEN_A_CORE
PROCEDURE NOTE:  Central Line removal    Site:    SABA tesfaye     Requested by nephrology to remove the Central line. Last INR, aPTT, Platelets reviewed. Attempted to explain procedure to patient at bedside with attending/ and  but mental status altered.  Placed in Trendelenburg. Catheter removed and tip intact. applied for greater than 5 mins, no hematoma or bleeding noted. Patient tolerated procedure well. Dry dressing applied.     Nisha Calderon PA-C  Department of Internal Medicine  pager [phone], available on [employer] TEAMS PROCEDURE NOTE:  Shiley removal    Site:    RIJ      Requested by nephrology to remove the shiley. Last INR, aPTT, Platelets reviewed. Attempted to explain procedure to patient at bedside with attending/ and  but mental status altered.  Placed in Trendelenburg. Catheter removed and tip intact. applied for greater than 5 mins, no hematoma or bleeding noted. Patient tolerated procedure well. Dry dressing applied.     Nisha Calderon PA-C  Department of Internal Medicine  pager [phone], available on [employer] TEAMS

## 2024-09-12 NOTE — PROGRESS NOTE ADULT - SUBJECTIVE AND OBJECTIVE BOX
Mony Lee  [facility] Medicine  Pager #[phone]  Available on Microsoft Teams    Patient is a 58y old  Female who presents with a chief complaint of urgent HD (12 Sep 2024 09:05)      SUBJECTIVE / OVERNIGHT EVENTS: Patient seen and examined at bedside. Malay  #[id number] used. Patient mumbling nonsensical things about her family. Able to state her name, and that she is thirsty.    ADDITIONAL REVIEW OF SYSTEMS:    MEDICATIONS  (STANDING):  amLODIPine   Tablet 5 milliGRAM(s) Oral daily  chlorhexidine 2% Cloths 1 Application(s) Topical <User Schedule>  dextrose 5%. 1000 milliLiter(s) (100 mL/Hr) IV Continuous <Continuous>  dextrose 5%. 1000 milliLiter(s) (100 mL/Hr) IV Continuous <Continuous>  dextrose 5%. 1000 milliLiter(s) (50 mL/Hr) IV Continuous <Continuous>  dextrose 50% Injectable 25 Gram(s) IV Push once  dextrose 50% Injectable 12.5 Gram(s) IV Push once  dextrose 50% Injectable 25 Gram(s) IV Push once  ertapenem  IVPB 500 milliGRAM(s) IV Intermittent every 24 hours  glucagon  Injectable 1 milliGRAM(s) IntraMuscular once  heparin   Injectable 5000 Unit(s) SubCutaneous every 8 hours  mupirocin 2% Ointment 1 Application(s) Topical two times a day  Nephro-johny 1 Tablet(s) Oral daily  sevelamer carbonate 800 milliGRAM(s) Oral three times a day with meals    MEDICATIONS  (PRN):  dextrose Oral Gel 15 Gram(s) Oral once PRN Blood Glucose LESS THAN 70 milliGRAM(s)/deciliter  sodium chloride 0.9% lock flush 10 milliLiter(s) IV Push every 1 hour PRN Pre/post blood products, medications, blood draw, and to maintain line patency      CAPILLARY BLOOD GLUCOSE        I&O's Summary    11 Sep 2024 07:01  -  12 Sep 2024 07:00  --------------------------------------------------------  IN: 0 mL / OUT: 2600 mL / NET: -2600 mL        PHYSICAL EXAM:    Vital Signs Last 24 Hrs  T(C): 36.9 (12 Sep 2024 11:45), Max: 36.9 (12 Sep 2024 11:45)  T(F): 98.4 (12 Sep 2024 11:45), Max: 98.4 (12 Sep 2024 11:45)  HR: 96 (12 Sep 2024 11:45) (87 - 96)  BP: 149/70 (12 Sep 2024 11:45) (149/70 - 173/89)  BP(mean): --  RR: 18 (12 Sep 2024 11:45) (18 - 18)  SpO2: 97% (12 Sep 2024 11:45) (97% - 100%)    Parameters below as of 12 Sep 2024 11:45  Patient On (Oxygen Delivery Method): room air    CONSTITUTIONAL: NAD, in b/l wrist restraints  EYES: Conjunctiva and sclera clear  ENMT: Dry oral mucosa, poor oral hygiene suspect due to betel nut chewing  RESPIRATORY: Normal respiratory effort; lungs are clear to auscultation bilaterally  CARDIOVASCULAR: Regular rate and rhythm, normal S1 and S2, no murmur/rub/gallop  ABDOMEN: Soft, nontender to palpation, normoactive bowel sounds  : Christian  PSYCH: A+O to person  NEUROLOGY: Moves all extremities   SKIN: R [facility]    LABS:                        7.6    5.67  )-----------( 151      ( 12 Sep 2024 07:00 )             25.5     09-12    155<H>  |  121<H>  |  84<H>  ----------------------------<  119<H>  4.4   |  18<L>  |  3.78<H>    Ca    9.0      12 Sep 2024 07:00  Phos  3.6     09-12  Mg     2.90     09-12    TPro  6.9  /  Alb  3.1<L>  /  TBili  0.7  /  DBili  x   /  AST  37<H>  /  ALT  21  /  AlkPhos  102  09-12      Urinalysis Basic - ( 12 Sep 2024 07:00 )    Color: x / Appearance: x / SG: x / pH: x  Gluc: 119 mg/dL / Ketone: x  / Bili: x / Urobili: x   Blood: x / Protein: x / Nitrite: x   Leuk Esterase: x / RBC: x / WBC x   Sq Epi: x / Non Sq Epi: x / Bacteria: x      RADIOLOGY & ADDITIONAL TESTS: No new imaging  Results Reviewed: Yes  Imaging Personally Reviewed:  Electrocardiogram Personally Reviewed:    COORDINATION OF CARE:  Care Discussed with Consultants/Other Providers [Y/N]: yes- nephrology fellow- geena/brad Fay today  Prior or Outpatient Records Reviewed [Y/N]:

## 2024-09-12 NOTE — PROGRESS NOTE ADULT - PROBLEM SELECTOR PLAN 1
Cr 7.09 on admission with hyperkalemia. Cr improving  -likely ATN in setting of sepsis, also received IV contrast  -nephrology following, received HD on 9/8 and 9/9  -per nephrology, lorenzo d/brad Fay today  -no indication for HD today  -has Christian in place for intake/output monitoring  -monitor Cr daily  -avoid nephrotoxic agents

## 2024-09-13 DIAGNOSIS — E03.9 HYPOTHYROIDISM, UNSPECIFIED: ICD-10-CM

## 2024-09-13 DIAGNOSIS — Z29.9 ENCOUNTER FOR PROPHYLACTIC MEASURES, UNSPECIFIED: ICD-10-CM

## 2024-09-13 LAB
ANION GAP SERPL CALC-SCNC: 13 MMOL/L — SIGNIFICANT CHANGE UP (ref 7–14)
BUN SERPL-MCNC: 82 MG/DL — HIGH (ref 7–23)
CALCIUM SERPL-MCNC: 8.7 MG/DL — SIGNIFICANT CHANGE UP (ref 8.4–10.5)
CHLORIDE SERPL-SCNC: 114 MMOL/L — HIGH (ref 98–107)
CO2 SERPL-SCNC: 22 MMOL/L — SIGNIFICANT CHANGE UP (ref 22–31)
CREAT SERPL-MCNC: 3.41 MG/DL — HIGH (ref 0.5–1.3)
EGFR: 15 ML/MIN/1.73M2 — LOW
FOLATE SERPL-MCNC: 12.5 NG/ML — SIGNIFICANT CHANGE UP (ref 3.1–17.5)
GLUCOSE SERPL-MCNC: 106 MG/DL — HIGH (ref 70–99)
HCT VFR BLD CALC: 24.4 % — LOW (ref 34.5–45)
HGB BLD-MCNC: 7.5 G/DL — LOW (ref 11.5–15.5)
MAGNESIUM SERPL-MCNC: 2.5 MG/DL — SIGNIFICANT CHANGE UP (ref 1.6–2.6)
MCHC RBC-ENTMCNC: 25.9 PG — LOW (ref 27–34)
MCHC RBC-ENTMCNC: 30.7 GM/DL — LOW (ref 32–36)
MCV RBC AUTO: 84.1 FL — SIGNIFICANT CHANGE UP (ref 80–100)
NRBC # BLD: 0 /100 WBCS — SIGNIFICANT CHANGE UP (ref 0–0)
NRBC # FLD: 0 K/UL — SIGNIFICANT CHANGE UP (ref 0–0)
PHOSPHATE SERPL-MCNC: 3.2 MG/DL — SIGNIFICANT CHANGE UP (ref 2.5–4.5)
PLATELET # BLD AUTO: 211 K/UL — SIGNIFICANT CHANGE UP (ref 150–400)
POTASSIUM SERPL-MCNC: 4.1 MMOL/L — SIGNIFICANT CHANGE UP (ref 3.5–5.3)
POTASSIUM SERPL-SCNC: 4.1 MMOL/L — SIGNIFICANT CHANGE UP (ref 3.5–5.3)
RBC # BLD: 2.9 M/UL — LOW (ref 3.8–5.2)
RBC # FLD: 19.1 % — HIGH (ref 10.3–14.5)
SODIUM SERPL-SCNC: 149 MMOL/L — HIGH (ref 135–145)
VIT B12 SERPL-MCNC: 1922 PG/ML — HIGH (ref 200–900)
WBC # BLD: 6.44 K/UL — SIGNIFICANT CHANGE UP (ref 3.8–10.5)
WBC # FLD AUTO: 6.44 K/UL — SIGNIFICANT CHANGE UP (ref 3.8–10.5)

## 2024-09-13 PROCEDURE — 99232 SBSQ HOSP IP/OBS MODERATE 35: CPT

## 2024-09-13 PROCEDURE — 99232 SBSQ HOSP IP/OBS MODERATE 35: CPT | Mod: GC

## 2024-09-13 RX ADMIN — Medication 100 MILLILITER(S): at 22:13

## 2024-09-13 RX ADMIN — ERTAPENEM 100 MILLIGRAM(S): 1 INJECTION, POWDER, LYOPHILIZED, FOR SOLUTION INTRAMUSCULAR; INTRAVENOUS at 17:10

## 2024-09-13 RX ADMIN — Medication 100 MILLILITER(S): at 07:56

## 2024-09-13 RX ADMIN — Medication 5000 UNIT(S): at 04:17

## 2024-09-13 RX ADMIN — Medication 5 MILLIGRAM(S): at 05:25

## 2024-09-13 RX ADMIN — SEVELAMER CARBONATE 800 MILLIGRAM(S): 800 TABLET, FILM COATED ORAL at 17:11

## 2024-09-13 RX ADMIN — Medication 5000 UNIT(S): at 11:45

## 2024-09-13 RX ADMIN — MULTI VITAMIN/MINERAL SUPPLEMENT WITH ASCORBIC ACID, NIACIN, PYRIDOXINE, PANTOTHENIC ACID, FOLIC ACID, RIBOFLAVIN, THIAMIN, BIOTIN, COBALAMIN AND ZINC. 1 TABLET(S): 60; 20; 12.5; 10; 10; 1.7; 1.5; 1; .3; .006 TABLET, COATED ORAL at 11:45

## 2024-09-13 RX ADMIN — Medication 100 MILLILITER(S): at 04:16

## 2024-09-13 RX ADMIN — SEVELAMER CARBONATE 800 MILLIGRAM(S): 800 TABLET, FILM COATED ORAL at 07:56

## 2024-09-13 RX ADMIN — MUPIROCIN 1 APPLICATION(S): 20 OINTMENT TOPICAL at 17:12

## 2024-09-13 RX ADMIN — SEVELAMER CARBONATE 800 MILLIGRAM(S): 800 TABLET, FILM COATED ORAL at 11:45

## 2024-09-13 RX ADMIN — Medication 25 MICROGRAM(S): at 04:17

## 2024-09-13 RX ADMIN — MUPIROCIN 1 APPLICATION(S): 20 OINTMENT TOPICAL at 04:17

## 2024-09-13 RX ADMIN — Medication 5000 UNIT(S): at 22:05

## 2024-09-13 RX ADMIN — CHLORHEXIDINE GLUCONATE ORAL RINSE 1 APPLICATION(S): 1.2 SOLUTION DENTAL at 04:16

## 2024-09-13 NOTE — PROGRESS NOTE ADULT - PROBLEM SELECTOR PLAN 1
Cr 7.09 on admission with hyperkalemia. Cr improving  -likely ATN in setting of sepsis, also received IV contrast  -nephrology following, received HD on 9/8 and 9/9  -Sumeet removed no 9/12  -no indication for HD today  -has Christian in place for intake/output monitoring- will d/c and TOV tonight  -monitor Cr daily  -avoid nephrotoxic agents Cr 7.09 on admission with hyperkalemia. Cr improving  -likely ATN in setting of sepsis, also received IV contrast  -nephrology following, received HD on 9/8 and 9/9  -Shiley removed on 9/12  -no indication for HD today  -has Christian in place for intake/output monitoring- will d/c and TOV tonight  -monitor Cr daily  -avoid nephrotoxic agents

## 2024-09-13 NOTE — DISCHARGE NOTE PROVIDER - DETAILS OF MALNUTRITION DIAGNOSIS/DIAGNOSES
This patient has been assessed with a concern for Malnutrition and was treated during this hospitalization for the following Nutrition diagnosis/diagnoses:     -  09/18/2024: Moderate protein-calorie malnutrition

## 2024-09-13 NOTE — DISCHARGE NOTE PROVIDER - NSDCMRMEDTOKEN_GEN_ALL_CORE_FT
atorvastatin 20 mg oral tablet: 1 tab(s) orally once a day  dexlansoprazole 60 mg oral delayed release capsule: 1 cap(s) orally  folic acid: 1 milligram(s) once a day  gabapentin 100 mg oral capsule: 1 cap(s) orally once a day  hydroxychloroquine 200 mg oral tablet: 1 tab(s) orally 2 times a day  losartan 50 mg oral tablet: 1 tab(s) orally  methotrexate 2.5 mg oral tablet: 6 tab(s) orally once a week  ondansetron 4 mg oral tablet: 1 tab(s) orally every 8 hours   amLODIPine 5 mg oral tablet: 1 tab(s) orally once a day  atorvastatin 20 mg oral tablet: 1 tab(s) orally once a day  dexlansoprazole 60 mg oral delayed release capsule: 1 cap(s) orally  folic acid: 1 milligram(s) once a day  hydroxychloroquine 200 mg oral tablet: 1 tab(s) orally 2 times a day  levothyroxine 25 mcg (0.025 mg) oral tablet: 1 tab(s) orally once a day

## 2024-09-13 NOTE — DISCHARGE NOTE PROVIDER - NSDCCPCAREPLAN_GEN_ALL_CORE_FT
PRINCIPAL DISCHARGE DIAGNOSIS  Diagnosis: Pyelonephritis  Assessment and Plan of Treatment: You were hospitalized for a severe infection of your kidneys. You were treated with IV antibiotics.      SECONDARY DISCHARGE DIAGNOSES  Diagnosis: LINA (acute kidney injury)  Assessment and Plan of Treatment: You had kidney failure when you were hospitalized due to a kidney infection. You had 2 sessions of dialysis and your kidney function improved.     PRINCIPAL DISCHARGE DIAGNOSIS  Diagnosis: Pyelonephritis  Assessment and Plan of Treatment: You were hospitalized for a severe infection of your kidneys. You were treated with IV antibiotics.      SECONDARY DISCHARGE DIAGNOSES  Diagnosis: LINA (acute kidney injury)  Assessment and Plan of Treatment: You had kidney failure when you were hospitalized due to a kidney infection. You had 2 sessions of dialysis and your kidney function improved.    Diagnosis: Essential hypertension  Assessment and Plan of Treatment: Your losartan was discontinued secondary to your issue with your kidneys. You were started on Norvasc instead.    Diagnosis: Rheumatoid arthritis  Assessment and Plan of Treatment: Please contineu to take your hydroxhloroquine and follow up with your PCP as to when to restart your methotrexate.     PRINCIPAL DISCHARGE DIAGNOSIS  Diagnosis: Pyelonephritis  Assessment and Plan of Treatment: You were hospitalized for a severe infection of your kidneys. You were treated with IV antibiotics.      SECONDARY DISCHARGE DIAGNOSES  Diagnosis: LINA (acute kidney injury)  Assessment and Plan of Treatment: You had kidney failure when you were hospitalized due to a kidney infection. You had 2 sessions of dialysis and your kidney function improved. Kidney function remains elevated but no longer require dialysis. Stopped Losartan. Follow up with nephrology.    Diagnosis: Essential hypertension  Assessment and Plan of Treatment: Your losartan was discontinued secondary to your issue with your kidneys. You were started on Norvasc instead.    Diagnosis: Acute urinary retention  Assessment and Plan of Treatment: You had retention fo urine and required placement of Christian catheter. You failed trial of void. Plan to discharge with Christian catheter. Need to follow up with urology office for outpatient catheter removal and trial of void.    Diagnosis: Hypothyroidism  Assessment and Plan of Treatment: You were started on Levothyroxine. Follow up with primary care doctor and repeat thyroid function tests in 4 weeks.    Diagnosis: Rheumatoid arthritis  Assessment and Plan of Treatment: Please contineu to take your hydroxhloroquine and follow up with your PCP as to when to restart your methotrexate.

## 2024-09-13 NOTE — PROGRESS NOTE ADULT - ASSESSMENT
58 year old Telugu-speaking female with a history of RA on MTX, HTN, HLD initially admitted to MICU for sepsis with acute renal failure 2/2 ESBL. E. coli bacteremia and UTI. Hospital course c/b acute encephalopathy and dysphagia.

## 2024-09-13 NOTE — DISCHARGE NOTE PROVIDER - HOSPITAL COURSE
58 year old German-speaking female with a history of RA on MTX, HTN, HLD initially admitted to MICU for sepsis with acute renal failure 2/2 ESBL. E. coli bacteremia and UTI. Hospital course c/b acute encephalopathy and dysphagia.    Patient was admitted to the MICU for acute renal failure and hyperkalemia requiring HD. Renal was consulted and patient had a Shiley placed and underwent HD on 9/8 and 9/9. Patient's renal function improved and Shiley was removed on 9/12. Patient had a garcia placed for I/O monitoring, and had TOV done on 9/13.    Patient was found to have sepsis due to pyelonephritis. Blood and urine cultures grew ESBL E. coli. ID was consulted and patient was started on ertapenem for a 10 day course from 9/6 - 9/16.    Patient had acute encephalopathy during her admission. Initial CT head did not show any acute changes. Patient had an NGT placed for tube feeds, which she removed on 9/10 PM. S&S evaluated the patient and she was started on a puree diet with moderately thickened liquids. Neurology was consulted and spoke with patient's son, who states that patient is at her baseline AAOx1. Patient's PO intake remained poor and she was started on IVF for hypernatremia.    Patient was found to have new hypothyroidism and was started on levothyroxine.   58 year old Macedonian-speaking female with a history of RA on MTX, HTN, HLD initially admitted to MICU for sepsis with acute renal failure 2/2 ESBL. E. coli bacteremia and UTI. Hospital course c/b acute encephalopathy and dysphagia.    Patient was admitted to the MICU for acute renal failure and hyperkalemia requiring HD. Renal was consulted and patient had a Shiley placed and underwent HD on 9/8 and 9/9. Patient's renal function improved and Shiley was removed on 9/12. Patient had a garcia placed for I/O monitoring, and had TOV done on 9/13, however unsuccessful and garcia was replaced, patient will need to follow-up with outpatient Urologist for further management,in 1-2 weeks to reattempt TOV. Garcia does not need to be flushed or changed prior to follow-up appointment.     Patient was found to have sepsis due to pyelonephritis. Blood and urine cultures grew ESBL E. coli. ID was consulted and patient was started on ertapenem for a 10 day course from 9/6 - 9/16.    Patient had acute encephalopathy during her admission. Initial CT head did not show any acute changes. Patient had an NGT placed for tube feeds, which she removed on 9/10 PM. S&S evaluated the patient and she was started on a puree diet with moderately thickened liquids. Neurology was consulted and spoke with patient's son, who states that patient is at her baseline AAOx1. Patient's PO intake remained poor and she was started on IVF for hypernatremia.    Patient was found to have new hypothyroidism and was started on levothyroxine.   58 year old Yi-speaking female with a history of RA on MTX, HTN, HLD initially admitted to MICU for sepsis with acute renal failure 2/2 ESBL. E. coli bacteremia and UTI. Hospital course c/b acute encephalopathy and dysphagia.    ·  Problem: LINA (acute kidney injury).   ·  Plan: Cr 7.09 on admission with hyperkalemia. Cr improving  -likely ATN in setting of sepsis, also received IV contrast  -nephrology following, received HD on 9/8 and 9/9  -Shiley removed on 9/12  -monitor Cr daily  -avoid nephrotoxic agents.    ·  Problem: UTI due to extended-spectrum beta lactamase (ESBL) producing Escherichia coli.   ·  Plan: Urine and blood cultures with ESBL E. coli  -CT abdomen/pelvis with pyelonephritis  -seen by ID   -completed course of ertapenem 9/16.    ·  Problem: Metabolic encephalopathy.   ·  Plan: AAOx1 on exam  -spoke with patient's son Vladislav on 9/11- he states that patient was taking care of herself prior to admission, but sometimes would not be able to answer "easy" questions or was easily forgetful. never saw a neurologist for these symptoms in the past  -encephalopathy is likely multifactorial in setting of uremia, recent infection, ICU delirium  -mental status is slowly improving  -s/p thiamine  -CT head on 9/9 without acute changes  -seen by speech and swallow dept- started on pureed diet  -neurology consulted, appreciate recs.    ·  Problem: Acute urinary retention.   ·  Plan: Christian d/pat overnight on 9/13  -failed TOV, Christian replaced 9/14  -TOV as outpatient,  f/up     ·  Problem: Anemia.   ·  Plan: Anemia likely d/t renal disease   -monitor CBC.    ·  Problem: Hypernatremia.   ·  Plan: Resolved  -s/p IVF  -monitor Na.    ·  Problem: Hypothyroidism.   ·  Plan: New diagnosis- TSH high, T3 and T4 low  -started on levothyroxine 25mcg daily  -repeat TFTs in 4-6 weeks outpatient.    ·  Problem: Essential hypertension.   ·  Plan: holding home losartan d/t LINA   -started amlodipine 5mg qd  -monitor BP.    ·  Problem: Moderate protein calorie malnutrition   ·  Plan: Continue w/ ensure     ·  Problem: Rheumatoid arthritis   ·  Plan: Resume Plaquenil, gets Methotrexate as outpatient

## 2024-09-13 NOTE — PROGRESS NOTE ADULT - PROBLEM SELECTOR PLAN 2
Pt. with hypernatremia in setting of decreased free water intake. SNa improved to 149 today. Continue D5W @100 cc/hr. Monitor SNa.    Diana Boyd, PGY-5  Nephrology Fellow  MS TEAMS preferred  (After 5pm or on weekends, please contact the fellow on call).

## 2024-09-13 NOTE — PROGRESS NOTE ADULT - SUBJECTIVE AND OBJECTIVE BOX
Mony Lee  [facility] Medicine  Pager #[phone]  Available on [employer] Teams    Patient is a 58y old  Female who presents with a chief complaint of urgent HD (13 Sep 2024 10:48)      SUBJECTIVE / OVERNIGHT EVENTS: Patient seen and examined at bedside. Lithuanian  #[id number] used. Patient AAOx1. Repeatedly saying that she has to urinate despite explaining to patient that she has a Christian catheter in place. Limited ROS due to mental status.    ADDITIONAL REVIEW OF SYSTEMS:    MEDICATIONS  (STANDING):  amLODIPine   Tablet 5 milliGRAM(s) Oral daily  chlorhexidine 2% Cloths 1 Application(s) Topical <User Schedule>  dextrose 5%. 1000 milliLiter(s) (100 mL/Hr) IV Continuous <Continuous>  dextrose 5%. 1000 milliLiter(s) (100 mL/Hr) IV Continuous <Continuous>  dextrose 5%. 1000 milliLiter(s) (50 mL/Hr) IV Continuous <Continuous>  dextrose 50% Injectable 25 Gram(s) IV Push once  dextrose 50% Injectable 12.5 Gram(s) IV Push once  dextrose 50% Injectable 25 Gram(s) IV Push once  ertapenem  IVPB 500 milliGRAM(s) IV Intermittent every 24 hours  glucagon  Injectable 1 milliGRAM(s) IntraMuscular once  heparin   Injectable 5000 Unit(s) SubCutaneous every 8 hours  levothyroxine 25 MICROGram(s) Oral daily  mupirocin 2% Ointment 1 Application(s) Topical two times a day  Nephro-johny 1 Tablet(s) Oral daily  sevelamer carbonate 800 milliGRAM(s) Oral three times a day with meals    MEDICATIONS  (PRN):  dextrose Oral Gel 15 Gram(s) Oral once PRN Blood Glucose LESS THAN 70 milliGRAM(s)/deciliter  sodium chloride 0.9% lock flush 10 milliLiter(s) IV Push every 1 hour PRN Pre/post blood products, medications, blood draw, and to maintain line patency      CAPILLARY BLOOD GLUCOSE        I&O's Summary    12 Sep 2024 07:01  -  13 Sep 2024 07:00  --------------------------------------------------------  IN: 500 mL / OUT: 900 mL / NET: -400 mL    13 Sep 2024 07:01  -  13 Sep 2024 12:51  --------------------------------------------------------  IN: 0 mL / OUT: 800 mL / NET: -800 mL        PHYSICAL EXAM:    Vital Signs Last 24 Hrs  T(C): 36.3 (13 Sep 2024 12:04), Max: 36.6 (12 Sep 2024 21:45)  T(F): 97.4 (13 Sep 2024 12:04), Max: 97.8 (12 Sep 2024 21:45)  HR: 98 (13 Sep 2024 12:04) (82 - 98)  BP: 145/75 (13 Sep 2024 12:04) (145/75 - 154/76)  BP(mean): --  RR: 18 (13 Sep 2024 12:04) (16 - 18)  SpO2: 100% (13 Sep 2024 12:04) (97% - 100%)    Parameters below as of 13 Sep 2024 12:04  Patient On (Oxygen Delivery Method): room air    CONSTITUTIONAL: NAD, in b/l wrist restraints  EYES: Conjunctiva and sclera clear  ENMT: Dry oral mucosa, poor oral hygiene suspect due to betel nut chewing  RESPIRATORY: Normal respiratory effort; lungs are clear to auscultation bilaterally  CARDIOVASCULAR: Regular rate and rhythm, normal S1 and S2, no murmur/rub/gallop  ABDOMEN: Soft, nontender to palpation, normoactive bowel sounds  : Christian  PSYCH: A+O to person  NEUROLOGY: Moves all extremities   SKIN: Araley removed    LABS:                        7.5    6.44  )-----------( 211      ( 13 Sep 2024 07:13 )             24.4     09-13    149<H>  |  114<H>  |  82<H>  ----------------------------<  106<H>  4.1   |  22  |  3.41<H>    Ca    8.7      13 Sep 2024 07:13  Phos  3.2     09-13  Mg     2.50     09-13    TPro  6.9  /  Alb  3.1<L>  /  TBili  0.7  /  DBili  x   /  AST  37<H>  /  ALT  21  /  AlkPhos  102  09-12          Urinalysis Basic - ( 13 Sep 2024 07:13 )    Color: x / Appearance: x / SG: x / pH: x  Gluc: 106 mg/dL / Ketone: x  / Bili: x / Urobili: x   Blood: x / Protein: x / Nitrite: x   Leuk Esterase: x / RBC: x / WBC x   Sq Epi: x / Non Sq Epi: x / Bacteria: x          RADIOLOGY & ADDITIONAL TESTS: No new imaging  Results Reviewed: Yes  Imaging Personally Reviewed:  Electrocardiogram Personally Reviewed:    COORDINATION OF CARE:  Care Discussed with Consultants/Other Providers [Y/N]:  Prior or Outpatient Records Reviewed [Y/N]:

## 2024-09-13 NOTE — PROGRESS NOTE ADULT - SUBJECTIVE AND OBJECTIVE BOX
[facility] Division of Kidney Diseases & Hypertension  FOLLOW UP NOTE  [phone]--------------------------------------------------------------------------------    Chief Complaint: LINA    24 hour events/subjective: Seen and examined at bedside. c/o thirst. No fever, chills, edema. More alert and awake.        PAST HISTORY  --------------------------------------------------------------------------------  No significant changes to PMH, PSH, FHx, SHx, unless otherwise noted    ALLERGIES & MEDICATIONS  --------------------------------------------------------------------------------  Allergies    No Known Allergies        Standing Inpatient Medications  amLODIPine   Tablet 5 milliGRAM(s) Oral daily  chlorhexidine 2% Cloths 1 Application(s) Topical <User Schedule>  dextrose 5%. 1000 milliLiter(s) IV Continuous <Continuous>  dextrose 5%. 1000 milliLiter(s) IV Continuous <Continuous>  dextrose 5%. 1000 milliLiter(s) IV Continuous <Continuous>  dextrose 50% Injectable 25 Gram(s) IV Push once  dextrose 50% Injectable 12.5 Gram(s) IV Push once  dextrose 50% Injectable 25 Gram(s) IV Push once  ertapenem  IVPB 500 milliGRAM(s) IV Intermittent every 24 hours  glucagon  Injectable 1 milliGRAM(s) IntraMuscular once  heparin   Injectable 5000 Unit(s) SubCutaneous every 8 hours  levothyroxine 25 MICROGram(s) Oral daily  mupirocin 2% Ointment 1 Application(s) Topical two times a day  Nephro-johny 1 Tablet(s) Oral daily  sevelamer carbonate 800 milliGRAM(s) Oral three times a day with meals    PRN Inpatient Medications  dextrose Oral Gel 15 Gram(s) Oral once PRN  sodium chloride 0.9% lock flush 10 milliLiter(s) IV Push every 1 hour PRN      REVIEW OF SYSTEMS  --------------------------------------------------------------------------------  Gen: No fevers/chills  Skin: No rashes  Head/Eyes/Ears/Mouth: No headache; Normal hearing; Normal vision w/o blurriness  Respiratory: No dyspnea, cough, wheezing, hemoptysis  CV: No chest pain, PND, orthopnea  GI: No abdominal pain, diarrhea, constipation, nausea, vomiting, c/o thirst  : No increased frequency, dysuria, hematuria, nocturia  MSK: No joint pain/swelling; no back pain; no edema  Neuro: No dizziness/lightheadedness, weakness, seizures, numbness, tingling      All other systems were reviewed and are negative, except as noted.    VITALS/PHYSICAL EXAM  --------------------------------------------------------------------------------  T(C): 36.4 (09-13-24 @ 05:20), Max: 36.9 (09-12-24 @ 11:45)  HR: 84 (09-13-24 @ 05:20) (82 - 96)  BP: 147/78 (09-13-24 @ 05:20) (147/78 - 154/76)  RR: 16 (09-13-24 @ 05:20) (16 - 18)  SpO2: 100% (09-13-24 @ 05:20) (97% - 100%)  Wt(kg): --        09-12-24 @ 07:01  -  09-13-24 @ 07:00  --------------------------------------------------------  IN: 500 mL / OUT: 900 mL / NET: -400 mL    09-13-24 @ 07:01  -  09-13-24 @ 10:48  --------------------------------------------------------  IN: 0 mL / OUT: 800 mL / NET: -800 mL      Physical Exam:  	Gen: NAD, well-appearing  	HEENT: PERRL, supple neck, clear oropharynx  	Pulm: CTA B/L  	CV: RRR, S1S2;  	Abd: +BS, soft, nontender/nondistended  	: No suprapubic tenderness                      Extremities: no bilateral LE edema noted.                       Neuro: No focal deficits  	Skin: Warm, without rashes    LABS/STUDIES  --------------------------------------------------------------------------------              7.5    6.44  >-----------<  211      [09-13-24 @ 07:13]              24.4     149  |  114  |  82  ----------------------------<  106      [09-13-24 @ 07:13]  4.1   |  22  |  3.41        Ca     8.7     [09-13-24 @ 07:13]      Mg     2.50     [09-13-24 @ 07:13]      Phos  3.2     [09-13-24 @ 07:13]    TPro  6.9  /  Alb  3.1  /  TBili  0.7  /  DBili  x   /  AST  37  /  ALT  21  /  AlkPhos  102  [09-12-24 @ 07:00]      Creatinine Trend:  SCr 3.41 [09-13 @ 07:13]  SCr 3.78 [09-12 @ 07:00]  SCr 3.95 [09-11 @ 06:40]  SCr 3.44 [09-10 @ 01:10]  SCr 4.48 [09-09 @ 01:45]      Iron 86, TIBC --, %sat --      [09-12-24 @ 07:00]  TSH 6.55      [09-12-24 @ 07:00]    HBsAb <3.0      [09-08-24 @ 19:49]  HBsAg Nonreact      [09-08-24 @ 19:49]  HBcAb Nonreact      [09-08-24 @ 19:49]  HCV 0.09, Nonreact      [09-08-24 @ 19:49]  HIV Nonreact      [09-12-24 @ 07:00]  Syphilis Screen (Treponema Pallidum Ab) Negative      [09-12-24 @ 07:00] [facility] Division of Kidney Diseases & Hypertension  FOLLOW UP NOTE  [phone]--------------------------------------------------------------------------------    Chief Complaint: LINA, Hypernatremia    24 hour events/subjective: Right IJ non-tunneled HD catheter removed yesterday (9/12/24). Pt. was seen and examined today. Pt. more alert and awake this morning, says she feels thirsty and wants to drink water.      PAST HISTORY  --------------------------------------------------------------------------------  No significant changes to PMH, PSH, FHx, SHx, unless otherwise noted    ALLERGIES & MEDICATIONS  --------------------------------------------------------------------------------  Allergies    No Known Allergies    Standing Inpatient Medications  amLODIPine   Tablet 5 milliGRAM(s) Oral daily  chlorhexidine 2% Cloths 1 Application(s) Topical <User Schedule>  dextrose 5%. 1000 milliLiter(s) IV Continuous <Continuous>  dextrose 5%. 1000 milliLiter(s) IV Continuous <Continuous>  dextrose 5%. 1000 milliLiter(s) IV Continuous <Continuous>  dextrose 50% Injectable 25 Gram(s) IV Push once  dextrose 50% Injectable 12.5 Gram(s) IV Push once  dextrose 50% Injectable 25 Gram(s) IV Push once  ertapenem  IVPB 500 milliGRAM(s) IV Intermittent every 24 hours  glucagon  Injectable 1 milliGRAM(s) IntraMuscular once  heparin   Injectable 5000 Unit(s) SubCutaneous every 8 hours  levothyroxine 25 MICROGram(s) Oral daily  mupirocin 2% Ointment 1 Application(s) Topical two times a day  Nephro-johny 1 Tablet(s) Oral daily  sevelamer carbonate 800 milliGRAM(s) Oral three times a day with meals    PRN Inpatient Medications  dextrose Oral Gel 15 Gram(s) Oral once PRN  sodium chloride 0.9% lock flush 10 milliLiter(s) IV Push every 1 hour PRN    REVIEW OF SYSTEMS  --------------------------------------------------------------------------------  Limited ROS obtained from pt., see HPI    VITALS/PHYSICAL EXAM  --------------------------------------------------------------------------------  T(C): 36.4 (09-13-24 @ 05:20), Max: 36.9 (09-12-24 @ 11:45)  HR: 84 (09-13-24 @ 05:20) (82 - 96)  BP: 147/78 (09-13-24 @ 05:20) (147/78 - 154/76)  RR: 16 (09-13-24 @ 05:20) (16 - 18)  SpO2: 100% (09-13-24 @ 05:20) (97% - 100%)  Wt(kg): --    09-12-24 @ 07:01  -  09-13-24 @ 07:00  --------------------------------------------------------  IN: 500 mL / OUT: 900 mL / NET: -400 mL    09-13-24 @ 07:01  -  09-13-24 @ 10:48  --------------------------------------------------------  IN: 0 mL / OUT: 800 mL / NET: -800 mL    Physical Exam:  	Gen: resting   	HEENT: Anicteric  	Pulm: Fair air entry B/L  	CV: S1S2+  	Abd: Soft, +BS   	Ext: LE trace edema B/L  	Neuro: Awake, alert             LABS/STUDIES  --------------------------------------------------------------------------------              7.5    6.44  >-----------<  211      [09-13-24 @ 07:13]              24.4     149  |  114  |  82  ----------------------------<  106      [09-13-24 @ 07:13]  4.1   |  22  |  3.41        Ca     8.7     [09-13-24 @ 07:13]      Mg     2.50     [09-13-24 @ 07:13]      Phos  3.2     [09-13-24 @ 07:13]    TPro  6.9  /  Alb  3.1  /  TBili  0.7  /  DBili  x   /  AST  37  /  ALT  21  /  AlkPhos  102  [09-12-24 @ 07:00]    Creatinine Trend:  SCr 3.41 [09-13 @ 07:13]  SCr 3.78 [09-12 @ 07:00]  SCr 3.95 [09-11 @ 06:40]  SCr 3.44 [09-10 @ 01:10]  SCr 4.48 [09-09 @ 01:45]    HBsAb <3.0      [09-08-24 @ 19:49]  HBsAg Nonreact      [09-08-24 @ 19:49]  HBcAb Nonreact      [09-08-24 @ 19:49]  HCV 0.09, Nonreact      [09-08-24 @ 19:49]  HIV Nonreact      [09-12-24 @ 07:00]

## 2024-09-13 NOTE — DISCHARGE NOTE PROVIDER - NSFOLLOWUPCLINICS_GEN_ALL_ED_FT
[facility]rology  [address] [place] [address], [place] [address]  Phone: [phone]  Fax:

## 2024-09-13 NOTE — PROGRESS NOTE ADULT - ATTENDING COMMENTS
Pt. with LINA, likely ATN. Pt. received HD on 9/8/24 and 9/9/24. Assessment and plan for LINA/HD and hypernatremia as outlined above. Scr decreased to 3.41 today. Monitor labs and urine output. Avoid any potential nephrotoxins. Dose medications as per eGFR.

## 2024-09-13 NOTE — PROGRESS NOTE ADULT - PROBLEM SELECTOR PLAN 5
BP elevated  -holding home losartan  -started amlodipine 5mg qd New diagnosis- TSH high, T3 and T4 low  -started on levothyroxine 25mcg daily  -repeat TFTs in 4-6 weeks outpatient

## 2024-09-13 NOTE — PROGRESS NOTE ADULT - PROBLEM SELECTOR PLAN 2
Urine and blood cultures with ESBL E. coli  -CT abdomen/pelvis with pyelonephritis  -ID following, appreciate recs  -c/w ertapenem renally dosed, plan for 10 day course through 9/16

## 2024-09-13 NOTE — PROGRESS NOTE ADULT - PROBLEM SELECTOR PLAN 1
Pt. with LINA in setting of UTI/sepsis/bacteremia and recent IV contrast use. Pt with likely ATN. On admission, Scr was elevated at 7.02. Pt. initiated on HD on 9/8/24 due to altered mental status and concern for uremia. Pt. received second HD treatment on 9/9/24. Scr improved to 3.4 today. Pt. non-oliguric with 900cc UOP in last 24 hrs. No current plan for HD, removed HD catheter on 9/12. Christian removed today for TOV. Monitor labs and urine output. Avoid any potential nephrotoxins. Dose medications as per eGFR. Pt. with LINA in setting of UTI/sepsis/bacteremia and recent IV contrast use. Pt. with likely ATN. On admission, Scr was elevated at 7.02. Pt. initiated on HD on 9/8/24 due to altered mental status and concern for uremia. Pt. received second HD treatment on 9/9/24. LINA resolving now. Scr improved to 3.4 today. Pt. non-oliguric with 900 cc UOP in last 24 hrs. No current plan for HD, removed HD catheter on 9/12/24. Christian removed today for TOV. Monitor labs and urine output. Avoid any potential nephrotoxins. Dose medications as per eGFR.

## 2024-09-13 NOTE — PROGRESS NOTE ADULT - SUBJECTIVE AND OBJECTIVE BOX
Follow Up:      Inverval History/ROS:Patient is a 58y old  Female who presents with a chief complaint of urgent HD (13 Sep 2024 15:49)    No fever    Allergies    No Known Allergies    Intolerances        ANTIMICROBIALS:  ertapenem  IVPB 500 every 24 hours      OTHER MEDS:  amLODIPine   Tablet 5 milliGRAM(s) Oral daily  chlorhexidine 2% Cloths 1 Application(s) Topical <User Schedule>  dextrose 5%. 1000 milliLiter(s) IV Continuous <Continuous>  dextrose 5%. 1000 milliLiter(s) IV Continuous <Continuous>  dextrose 5%. 1000 milliLiter(s) IV Continuous <Continuous>  dextrose 50% Injectable 25 Gram(s) IV Push once  dextrose 50% Injectable 12.5 Gram(s) IV Push once  dextrose 50% Injectable 25 Gram(s) IV Push once  dextrose Oral Gel 15 Gram(s) Oral once PRN  glucagon  Injectable 1 milliGRAM(s) IntraMuscular once  heparin   Injectable 5000 Unit(s) SubCutaneous every 8 hours  levothyroxine 25 MICROGram(s) Oral daily  mupirocin 2% Ointment 1 Application(s) Topical two times a day  Nephro-johny 1 Tablet(s) Oral daily  sevelamer carbonate 800 milliGRAM(s) Oral three times a day with meals  sodium chloride 0.9% lock flush 10 milliLiter(s) IV Push every 1 hour PRN      Vital Signs Last 24 Hrs  T(C): 36.3 (13 Sep 2024 12:04), Max: 36.6 (12 Sep 2024 21:45)  T(F): 97.4 (13 Sep 2024 12:04), Max: 97.8 (12 Sep 2024 21:45)  HR: 98 (13 Sep 2024 12:04) (82 - 98)  BP: 145/75 (13 Sep 2024 12:04) (145/75 - 154/76)  BP(mean): --  RR: 18 (13 Sep 2024 12:04) (16 - 18)  SpO2: 100% (13 Sep 2024 12:04) (97% - 100%)    Parameters below as of 13 Sep 2024 12:04  Patient On (Oxygen Delivery Method): room air        PHYSICAL EXAM:  General: [ ] non-toxic  HEAD/EYES: [ ] PERRL [x ] white sclera [ ] icterus  ENT:  [ ] normal [x ] supple [ ] thrush [ ] pharyngeal exudate  Cardiovascular:   [ ] murmur [x ] normal [ ] PPM/AICD  Respiratory:  [x ] clear to ausculation bilaterally  GI:  [ x] soft, non-tender, normal bowel sounds  :  [ ] garcia [ ] no CVA tenderness   Musculoskeletal:  [ ] no synovitis  Neurologic:  [ ] non-focal exam   Skin:  x[ ] no rash  Lymph: [ x] no lymphadenopathy  Psychiatric:  [ ] appropriate affect [ ] alert & oriented  Lines:  [x ] no phlebitis [ ] central line                                7.5    6.44  )-----------( 211      ( 13 Sep 2024 07:13 )             24.4       09-13    149<H>  |  114<H>  |  82<H>  ----------------------------<  106<H>  4.1   |  22  |  3.41<H>    Ca    8.7      13 Sep 2024 07:13  Phos  3.2     09-13  Mg     2.50     09-13    TPro  6.9  /  Alb  3.1<L>  /  TBili  0.7  /  DBili  x   /  AST  37<H>  /  ALT  21  /  AlkPhos  102  09-12      Urinalysis Basic - ( 13 Sep 2024 07:13 )    Color: x / Appearance: x / SG: x / pH: x  Gluc: 106 mg/dL / Ketone: x  / Bili: x / Urobili: x   Blood: x / Protein: x / Nitrite: x   Leuk Esterase: x / RBC: x / WBC x   Sq Epi: x / Non Sq Epi: x / Bacteria: x        MICROBIOLOGY:Culture Results:   No growth at 5 days (09-07-24 @ 14:33)      RADIOLOGY:

## 2024-09-14 DIAGNOSIS — R33.8 OTHER RETENTION OF URINE: ICD-10-CM

## 2024-09-14 LAB
ANION GAP SERPL CALC-SCNC: 12 MMOL/L — SIGNIFICANT CHANGE UP (ref 7–14)
BLD GP AB SCN SERPL QL: NEGATIVE — SIGNIFICANT CHANGE UP
BUN SERPL-MCNC: 69 MG/DL — HIGH (ref 7–23)
CALCIUM SERPL-MCNC: 8 MG/DL — LOW (ref 8.4–10.5)
CHLORIDE SERPL-SCNC: 113 MMOL/L — HIGH (ref 98–107)
CO2 SERPL-SCNC: 20 MMOL/L — LOW (ref 22–31)
CREAT SERPL-MCNC: 3.03 MG/DL — HIGH (ref 0.5–1.3)
EGFR: 17 ML/MIN/1.73M2 — LOW
GLUCOSE SERPL-MCNC: 107 MG/DL — HIGH (ref 70–99)
HCT VFR BLD CALC: 19.7 % — CRITICAL LOW (ref 34.5–45)
HCT VFR BLD CALC: 22.3 % — LOW (ref 34.5–45)
HGB BLD-MCNC: 6 G/DL — CRITICAL LOW (ref 11.5–15.5)
HGB BLD-MCNC: 6.8 G/DL — CRITICAL LOW (ref 11.5–15.5)
MAGNESIUM SERPL-MCNC: 2.2 MG/DL — SIGNIFICANT CHANGE UP (ref 1.6–2.6)
MCHC RBC-ENTMCNC: 25.8 PG — LOW (ref 27–34)
MCHC RBC-ENTMCNC: 26.1 PG — LOW (ref 27–34)
MCHC RBC-ENTMCNC: 30.5 GM/DL — LOW (ref 32–36)
MCHC RBC-ENTMCNC: 30.5 GM/DL — LOW (ref 32–36)
MCV RBC AUTO: 84.5 FL — SIGNIFICANT CHANGE UP (ref 80–100)
MCV RBC AUTO: 85.7 FL — SIGNIFICANT CHANGE UP (ref 80–100)
NRBC # BLD: 0 /100 WBCS — SIGNIFICANT CHANGE UP (ref 0–0)
NRBC # BLD: 0 /100 WBCS — SIGNIFICANT CHANGE UP (ref 0–0)
NRBC # FLD: 0 K/UL — SIGNIFICANT CHANGE UP (ref 0–0)
NRBC # FLD: 0 K/UL — SIGNIFICANT CHANGE UP (ref 0–0)
PHOSPHATE SERPL-MCNC: 3.5 MG/DL — SIGNIFICANT CHANGE UP (ref 2.5–4.5)
PLATELET # BLD AUTO: 252 K/UL — SIGNIFICANT CHANGE UP (ref 150–400)
PLATELET # BLD AUTO: 290 K/UL — SIGNIFICANT CHANGE UP (ref 150–400)
POTASSIUM SERPL-MCNC: 4.1 MMOL/L — SIGNIFICANT CHANGE UP (ref 3.5–5.3)
POTASSIUM SERPL-SCNC: 4.1 MMOL/L — SIGNIFICANT CHANGE UP (ref 3.5–5.3)
RBC # BLD: 2.3 M/UL — LOW (ref 3.8–5.2)
RBC # BLD: 2.64 M/UL — LOW (ref 3.8–5.2)
RBC # FLD: 19.8 % — HIGH (ref 10.3–14.5)
RBC # FLD: 19.8 % — HIGH (ref 10.3–14.5)
RH IG SCN BLD-IMP: POSITIVE — SIGNIFICANT CHANGE UP
SODIUM SERPL-SCNC: 145 MMOL/L — SIGNIFICANT CHANGE UP (ref 135–145)
WBC # BLD: 5.87 K/UL — SIGNIFICANT CHANGE UP (ref 3.8–10.5)
WBC # BLD: 5.96 K/UL — SIGNIFICANT CHANGE UP (ref 3.8–10.5)
WBC # FLD AUTO: 5.87 K/UL — SIGNIFICANT CHANGE UP (ref 3.8–10.5)
WBC # FLD AUTO: 5.96 K/UL — SIGNIFICANT CHANGE UP (ref 3.8–10.5)

## 2024-09-14 PROCEDURE — 99232 SBSQ HOSP IP/OBS MODERATE 35: CPT

## 2024-09-14 RX ADMIN — Medication 5000 UNIT(S): at 23:16

## 2024-09-14 RX ADMIN — MUPIROCIN 1 APPLICATION(S): 20 OINTMENT TOPICAL at 04:18

## 2024-09-14 RX ADMIN — Medication 25 MICROGRAM(S): at 04:19

## 2024-09-14 RX ADMIN — SEVELAMER CARBONATE 800 MILLIGRAM(S): 800 TABLET, FILM COATED ORAL at 08:11

## 2024-09-14 RX ADMIN — Medication 100 MILLILITER(S): at 04:19

## 2024-09-14 RX ADMIN — MULTI VITAMIN/MINERAL SUPPLEMENT WITH ASCORBIC ACID, NIACIN, PYRIDOXINE, PANTOTHENIC ACID, FOLIC ACID, RIBOFLAVIN, THIAMIN, BIOTIN, COBALAMIN AND ZINC. 1 TABLET(S): 60; 20; 12.5; 10; 10; 1.7; 1.5; 1; .3; .006 TABLET, COATED ORAL at 12:12

## 2024-09-14 RX ADMIN — CHLORHEXIDINE GLUCONATE ORAL RINSE 1 APPLICATION(S): 1.2 SOLUTION DENTAL at 04:17

## 2024-09-14 RX ADMIN — Medication 5 MILLIGRAM(S): at 05:40

## 2024-09-14 RX ADMIN — SEVELAMER CARBONATE 800 MILLIGRAM(S): 800 TABLET, FILM COATED ORAL at 17:05

## 2024-09-14 RX ADMIN — SEVELAMER CARBONATE 800 MILLIGRAM(S): 800 TABLET, FILM COATED ORAL at 12:12

## 2024-09-14 RX ADMIN — ERTAPENEM 100 MILLIGRAM(S): 1 INJECTION, POWDER, LYOPHILIZED, FOR SOLUTION INTRAMUSCULAR; INTRAVENOUS at 17:09

## 2024-09-14 RX ADMIN — Medication 5000 UNIT(S): at 04:19

## 2024-09-14 RX ADMIN — Medication 100 MILLILITER(S): at 17:56

## 2024-09-14 NOTE — PROGRESS NOTE ADULT - ASSESSMENT
58 year old Sinhala-speaking female with a history of RA on MTX, HTN, HLD initially admitted to MICU for sepsis with acute renal failure 2/2 ESBL. E. coli bacteremia and UTI. Hospital course c/b acute encephalopathy and dysphagia.

## 2024-09-14 NOTE — PROGRESS NOTE ADULT - PROBLEM SELECTOR PLAN 5
Na 145 today  -c/w D5 IVF  -seen by speech and swallow dept- started on pureed diet with moderately thickened liquids  -will request repeat eval given improvement in mental status, patient no longer agitated  -monitor Na

## 2024-09-14 NOTE — PROGRESS NOTE ADULT - SUBJECTIVE AND OBJECTIVE BOX
Mony Moreno  Division of Hospital Medicine  Pager #[phone]  Available on [employer] Teams    Patient is a 58y old  Female who presents with a chief complaint of urgent HD (13 Sep 2024 18:11)      SUBJECTIVE / OVERNIGHT EVENTS: Patient seen and examined at bedside. Pt's family at bedside. Pt's daughter reports pt appears to be doing much better. Pt remains AAOx1 only, recognizes she is in a hospital when prompted. Daughter reports patient was fully functional at baseline prior to getting sick. Bladder scan >600cc this morning, pt straight cathed with 900cc drained. Pt said to her daughter that when she gets stronger, she will rip out everything on her.    ADDITIONAL REVIEW OF SYSTEMS:    MEDICATIONS  (STANDING):  amLODIPine   Tablet 5 milliGRAM(s) Oral daily  chlorhexidine 2% Cloths 1 Application(s) Topical <User Schedule>  dextrose 5%. 1000 milliLiter(s) (100 mL/Hr) IV Continuous <Continuous>  dextrose 5%. 1000 milliLiter(s) (100 mL/Hr) IV Continuous <Continuous>  dextrose 5%. 1000 milliLiter(s) (50 mL/Hr) IV Continuous <Continuous>  dextrose 50% Injectable 25 Gram(s) IV Push once  dextrose 50% Injectable 12.5 Gram(s) IV Push once  dextrose 50% Injectable 25 Gram(s) IV Push once  ertapenem  IVPB 500 milliGRAM(s) IV Intermittent every 24 hours  glucagon  Injectable 1 milliGRAM(s) IntraMuscular once  heparin   Injectable 5000 Unit(s) SubCutaneous every 8 hours  levothyroxine 25 MICROGram(s) Oral daily  Nephro-johny 1 Tablet(s) Oral daily  sevelamer carbonate 800 milliGRAM(s) Oral three times a day with meals    MEDICATIONS  (PRN):  dextrose Oral Gel 15 Gram(s) Oral once PRN Blood Glucose LESS THAN 70 milliGRAM(s)/deciliter  sodium chloride 0.9% lock flush 10 milliLiter(s) IV Push every 1 hour PRN Pre/post blood products, medications, blood draw, and to maintain line patency      CAPILLARY BLOOD GLUCOSE        I&O's Summary    13 Sep 2024 07:01  -  14 Sep 2024 07:00  --------------------------------------------------------  IN: 450 mL / OUT: 2350 mL / NET: -1900 mL    14 Sep 2024 07:01  -  14 Sep 2024 14:20  --------------------------------------------------------  IN: 0 mL / OUT: 900 mL / NET: -900 mL        PHYSICAL EXAM:    Vital Signs Last 24 Hrs  T(C): 36.2 (14 Sep 2024 13:45), Max: 36.8 (13 Sep 2024 20:17)  T(F): 97.2 (14 Sep 2024 13:45), Max: 98.2 (13 Sep 2024 20:17)  HR: 92 (14 Sep 2024 13:45) (87 - 94)  BP: 127/58 (14 Sep 2024 13:45) (124/66 - 144/72)  BP(mean): --  RR: 18 (14 Sep 2024 13:45) (16 - 18)  SpO2: 100% (14 Sep 2024 13:45) (100% - 100%)    Parameters below as of 14 Sep 2024 13:45  Patient On (Oxygen Delivery Method): room air    CONSTITUTIONAL: NAD, in b/l wrist restraints  EYES: Conjunctiva and sclera clear  ENMT: Dry oral mucosa, poor oral hygiene suspect due to betel nut chewing  RESPIRATORY: Normal respiratory effort; lungs are clear to auscultation bilaterally  CARDIOVASCULAR: Regular rate and rhythm, normal S1 and S2, no murmur/rub/gallop  ABDOMEN: Soft, nontender to palpation, normoactive bowel sounds  : Christian  PSYCH: A+O to person  NEUROLOGY: Moves all extremities   SKIN: Shiley removed    LABS:                        6.8    5.87  )-----------( 290      ( 14 Sep 2024 09:42 )             22.3     09-14    145  |  113<H>  |  69<H>  ----------------------------<  107<H>  4.1   |  20<L>  |  3.03<H>    Ca    8.0<L>      14 Sep 2024 06:25  Phos  3.5     09-14  Mg     2.20     09-14            Urinalysis Basic - ( 14 Sep 2024 06:25 )    Color: x / Appearance: x / SG: x / pH: x  Gluc: 107 mg/dL / Ketone: x  / Bili: x / Urobili: x   Blood: x / Protein: x / Nitrite: x   Leuk Esterase: x / RBC: x / WBC x   Sq Epi: x / Non Sq Epi: x / Bacteria: x          RADIOLOGY & ADDITIONAL TESTS: No new imaging  Results Reviewed: Yes  Imaging Personally Reviewed:  Electrocardiogram Personally Reviewed:    COORDINATION OF CARE:  Care Discussed with Consultants/Other Providers [Y/N]:  Prior or Outpatient Records Reviewed [Y/N]:

## 2024-09-14 NOTE — PROVIDER CONTACT NOTE (CRITICAL VALUE NOTIFICATION) - SITUATION
Pt Hgb 6, Hmt 19.7. No s/s of active bleeding. No s/s of chest pain/sob and discomfort at this time.

## 2024-09-14 NOTE — PROGRESS NOTE ADULT - PROBLEM SELECTOR PLAN 1
Cr 7.09 on admission with hyperkalemia. Cr improving  -likely ATN in setting of sepsis, also received IV contrast  -nephrology following, received HD on 9/8 and 9/9  -Shiley removed on 9/12  -monitor Cr daily  -avoid nephrotoxic agents

## 2024-09-15 LAB
ANION GAP SERPL CALC-SCNC: 15 MMOL/L — HIGH (ref 7–14)
BUN SERPL-MCNC: 59 MG/DL — HIGH (ref 7–23)
CALCIUM SERPL-MCNC: 7.9 MG/DL — LOW (ref 8.4–10.5)
CHLORIDE SERPL-SCNC: 109 MMOL/L — HIGH (ref 98–107)
CO2 SERPL-SCNC: 16 MMOL/L — LOW (ref 22–31)
CREAT SERPL-MCNC: 2.62 MG/DL — HIGH (ref 0.5–1.3)
EGFR: 20 ML/MIN/1.73M2 — LOW
GLUCOSE SERPL-MCNC: 81 MG/DL — SIGNIFICANT CHANGE UP (ref 70–99)
HCT VFR BLD CALC: 23.3 % — LOW (ref 34.5–45)
HGB BLD-MCNC: 7.4 G/DL — LOW (ref 11.5–15.5)
MAGNESIUM SERPL-MCNC: 2 MG/DL — SIGNIFICANT CHANGE UP (ref 1.6–2.6)
MCHC RBC-ENTMCNC: 26.1 PG — LOW (ref 27–34)
MCHC RBC-ENTMCNC: 31.8 GM/DL — LOW (ref 32–36)
MCV RBC AUTO: 82 FL — SIGNIFICANT CHANGE UP (ref 80–100)
NRBC # BLD: 0 /100 WBCS — SIGNIFICANT CHANGE UP (ref 0–0)
NRBC # FLD: 0 K/UL — SIGNIFICANT CHANGE UP (ref 0–0)
PHOSPHATE SERPL-MCNC: 3.8 MG/DL — SIGNIFICANT CHANGE UP (ref 2.5–4.5)
PLATELET # BLD AUTO: 328 K/UL — SIGNIFICANT CHANGE UP (ref 150–400)
POTASSIUM SERPL-MCNC: 4.5 MMOL/L — SIGNIFICANT CHANGE UP (ref 3.5–5.3)
POTASSIUM SERPL-SCNC: 4.5 MMOL/L — SIGNIFICANT CHANGE UP (ref 3.5–5.3)
RBC # BLD: 2.84 M/UL — LOW (ref 3.8–5.2)
RBC # FLD: 18 % — HIGH (ref 10.3–14.5)
SODIUM SERPL-SCNC: 140 MMOL/L — SIGNIFICANT CHANGE UP (ref 135–145)
WBC # BLD: 5.91 K/UL — SIGNIFICANT CHANGE UP (ref 3.8–10.5)
WBC # FLD AUTO: 5.91 K/UL — SIGNIFICANT CHANGE UP (ref 3.8–10.5)

## 2024-09-15 PROCEDURE — 99232 SBSQ HOSP IP/OBS MODERATE 35: CPT

## 2024-09-15 PROCEDURE — 99232 SBSQ HOSP IP/OBS MODERATE 35: CPT | Mod: GC

## 2024-09-15 RX ADMIN — Medication 5 MILLIGRAM(S): at 06:31

## 2024-09-15 RX ADMIN — Medication 5000 UNIT(S): at 21:34

## 2024-09-15 RX ADMIN — ERTAPENEM 100 MILLIGRAM(S): 1 INJECTION, POWDER, LYOPHILIZED, FOR SOLUTION INTRAMUSCULAR; INTRAVENOUS at 17:36

## 2024-09-15 RX ADMIN — CHLORHEXIDINE GLUCONATE ORAL RINSE 1 APPLICATION(S): 1.2 SOLUTION DENTAL at 06:28

## 2024-09-15 RX ADMIN — Medication 5000 UNIT(S): at 13:28

## 2024-09-15 RX ADMIN — MULTI VITAMIN/MINERAL SUPPLEMENT WITH ASCORBIC ACID, NIACIN, PYRIDOXINE, PANTOTHENIC ACID, FOLIC ACID, RIBOFLAVIN, THIAMIN, BIOTIN, COBALAMIN AND ZINC. 1 TABLET(S): 60; 20; 12.5; 10; 10; 1.7; 1.5; 1; .3; .006 TABLET, COATED ORAL at 12:14

## 2024-09-15 RX ADMIN — Medication 5000 UNIT(S): at 06:28

## 2024-09-15 RX ADMIN — Medication 25 MICROGRAM(S): at 05:20

## 2024-09-15 RX ADMIN — SEVELAMER CARBONATE 800 MILLIGRAM(S): 800 TABLET, FILM COATED ORAL at 12:14

## 2024-09-15 RX ADMIN — SEVELAMER CARBONATE 800 MILLIGRAM(S): 800 TABLET, FILM COATED ORAL at 08:59

## 2024-09-15 RX ADMIN — SEVELAMER CARBONATE 800 MILLIGRAM(S): 800 TABLET, FILM COATED ORAL at 17:36

## 2024-09-15 NOTE — SWALLOW BEDSIDE ASSESSMENT ADULT - COMMENTS
Progress Note-Hospitalist 9/14: "58 year old German-speaking female with a history of RA on MTX, HTN, HLD initially admitted to MICU for sepsis with acute renal failure 2/2 ESBL. E. coli bacteremia and UTI. Hospital course c/b acute encephalopathy and dysphagia."    Most recent chest imaging, CXR 9/9: "IMPRESSION: Enteric tube courses below the diaphragm with its tip out of view. No focal consolidations."    Of Note: Patient seen this admission for initial Clinical Swallow Evaluation on 9/11/24 with recommendations of: "1. Puree with Moderately-Thick Liquids via Teaspoon. 2. Objective testing not indicated given presence of overt s/sx aspiration/penetration with thin & mildly-thick liquids; Recent chest imaging unremarkable for infection" (see note). Progress Note-Hospitalist 9/14: "58 year old Swedish-speaking female with a history of RA on MTX, HTN, HLD initially admitted to MICU for sepsis with acute renal failure 2/2 ESBL. E. coli bacteremia and UTI. Hospital course c/b acute encephalopathy and dysphagia."    Most recent chest imaging, CXR 9/9: "IMPRESSION: Enteric tube courses below the diaphragm with its tip out of view. No focal consolidations."    Of Note: Patient seen this admission for initial Clinical Swallow Evaluation on 9/11/24 with recommendations of: "1. Puree with Moderately-Thick Liquids via Teaspoon. 2. Objective testing not indicated given presence of overt s/sx aspiration/penetration with thin & mildly-thick liquids; Recent chest imaging unremarkable for infection" (see note).    Patient received resting in bed, on room air, and in no acute distress. B/L hand-restraints in place. Patient roused to alert state given verbal/tactile (i.e., sternum rub) cues and repositioned to upright. Patient Swedish-speaking. [employer] ( ID # [id number]) utilized to translate to patient's native language. Able to make wants/needs known; Intermittently followed simple verbal directives given maximal/multi-modal cueing. Stated, "I haven't had anything to eat yet." Agreeable to PO trials with SLP.

## 2024-09-15 NOTE — PHYSICAL THERAPY INITIAL EVALUATION ADULT - GENERAL OBSERVATIONS, REHAB EVAL
Pt received semi-supine in bed, all lines intact, in NAD.
Pt received semi-supine in bed, +NG tube, +bilateral wrist restraints, all lines intact, in NAD.

## 2024-09-15 NOTE — SWALLOW BEDSIDE ASSESSMENT ADULT - ORAL PREPARATORY PHASE
Slow/prolonged mastication of the solid (patient stated, "I have trouble chewing because of my teeth"). Additional PO trials of advanced solids, therefore, deferred./Reduced oral grading Reduced oral grading Reduced oral stripping of bolus from utensil, which improved as trials progressed./Reduced oral grading

## 2024-09-15 NOTE — PHYSICAL THERAPY INITIAL EVALUATION ADULT - ADDITIONAL COMMENTS
Unable to gather history of prior level of function secondary to pt's current cognitive status. Pt unable to communicate with [employer] [facility]  [place]ira ID [id number].    Pt left semi-supine in bed, all lines intact, all needs in reach, bed alarm set, in NAD. PINO Aguillon aware. Heart rate 92 beats per minute.
Unable to determine history of prior level of function secondary to pt's current cognitive status. Per care coordinator assessment, pt lives alone in an apartment, +elevator, was independent with all mobility and ADLs without device.     Pt left semi-supine in bed, all lines intact, all needs in reach, bed alarm set, in NAD. RN Jennifer aware. Heart rate 72 beats per minute.

## 2024-09-15 NOTE — SWALLOW BEDSIDE ASSESSMENT ADULT - ASR SWALLOW ASPIRATION MONITOR
change of breathing pattern/oral hygiene/position upright (90Y)/cough/gurgly voice/fever/pneumonia/throat clearing/upper respiratory infection
change of breathing pattern/oral hygiene/position upright (90Y)/cough/gurgly voice/fever/pneumonia/throat clearing/upper respiratory infection
4 = No assist / stand by assistance

## 2024-09-15 NOTE — PROGRESS NOTE ADULT - PROBLEM SELECTOR PLAN 5
Na 140 today, resolved  -d/c IVF  -seen by speech and swallow dept- started on pureed diet  -monitor Na

## 2024-09-15 NOTE — PROGRESS NOTE ADULT - PROBLEM SELECTOR PLAN 2
Pt. with hypernatremia in setting of decreased free water intake. SNa improved to 140 today. Monitor SNa

## 2024-09-15 NOTE — PROGRESS NOTE ADULT - SUBJECTIVE AND OBJECTIVE BOX
Mony Lee  [facility]  Pager #[phone]  Available on Microsoft Teams    Patient is a 58y old  Female who presents with a chief complaint of urgent HD (15 Sep 2024 13:42)      SUBJECTIVE / OVERNIGHT EVENTS: Patient seen and examined at bedside. Yoruba  #[id number] used. Patient oriented to name. Able to follow some simple commands.    ADDITIONAL REVIEW OF SYSTEMS:    MEDICATIONS  (STANDING):  amLODIPine   Tablet 5 milliGRAM(s) Oral daily  chlorhexidine 2% Cloths 1 Application(s) Topical <User Schedule>  dextrose 5%. 1000 milliLiter(s) (100 mL/Hr) IV Continuous <Continuous>  dextrose 5%. 1000 milliLiter(s) (50 mL/Hr) IV Continuous <Continuous>  dextrose 5%. 1000 milliLiter(s) (100 mL/Hr) IV Continuous <Continuous>  dextrose 50% Injectable 25 Gram(s) IV Push once  dextrose 50% Injectable 12.5 Gram(s) IV Push once  dextrose 50% Injectable 25 Gram(s) IV Push once  ertapenem  IVPB 500 milliGRAM(s) IV Intermittent every 24 hours  glucagon  Injectable 1 milliGRAM(s) IntraMuscular once  heparin   Injectable 5000 Unit(s) SubCutaneous every 8 hours  levothyroxine 25 MICROGram(s) Oral daily  Nephro-johny 1 Tablet(s) Oral daily  sevelamer carbonate 800 milliGRAM(s) Oral three times a day with meals    MEDICATIONS  (PRN):  dextrose Oral Gel 15 Gram(s) Oral once PRN Blood Glucose LESS THAN 70 milliGRAM(s)/deciliter  sodium chloride 0.9% lock flush 10 milliLiter(s) IV Push every 1 hour PRN Pre/post blood products, medications, blood draw, and to maintain line patency      CAPILLARY BLOOD GLUCOSE        I&O's Summary    14 Sep 2024 07:01  -  15 Sep 2024 07:00  --------------------------------------------------------  IN: 350 mL / OUT: 2200 mL / NET: -1850 mL        PHYSICAL EXAM:    Vital Signs Last 24 Hrs  T(C): 36.8 (15 Sep 2024 12:27), Max: 37 (14 Sep 2024 16:38)  T(F): 98.2 (15 Sep 2024 12:27), Max: 98.6 (14 Sep 2024 16:38)  HR: 79 (15 Sep 2024 12:27) (79 - 90)  BP: 130/71 (15 Sep 2024 12:27) (130/71 - 147/92)  BP(mean): --  RR: 16 (15 Sep 2024 12:27) (16 - 18)  SpO2: 100% (15 Sep 2024 12:27) (98% - 100%)    Parameters below as of 15 Sep 2024 12:27  Patient On (Oxygen Delivery Method): room air    CONSTITUTIONAL: NAD, in b/l wrist restraints  EYES: Conjunctiva and sclera clear  ENMT: Dry oral mucosa, poor oral hygiene suspect due to betel nut chewing  RESPIRATORY: Normal respiratory effort; lungs are clear to auscultation bilaterally  CARDIOVASCULAR: Regular rate and rhythm, normal S1 and S2, no murmur/rub/gallop  ABDOMEN: Soft, nontender to palpation, normoactive bowel sounds  : Christian  PSYCH: A+O to person  NEUROLOGY: Moves all extremities   SKIN: Shiley removed    LABS:                        7.4    5.91  )-----------( 328      ( 15 Sep 2024 01:21 )             23.3     09-15    140  |  109<H>  |  59<H>  ----------------------------<  81  4.5   |  16<L>  |  2.62<H>    Ca    7.9<L>      15 Sep 2024 06:01  Phos  3.8     09-15  Mg     2.00     09-15            Urinalysis Basic - ( 15 Sep 2024 06:01 )    Color: x / Appearance: x / SG: x / pH: x  Gluc: 81 mg/dL / Ketone: x  / Bili: x / Urobili: x   Blood: x / Protein: x / Nitrite: x   Leuk Esterase: x / RBC: x / WBC x   Sq Epi: x / Non Sq Epi: x / Bacteria: x          RADIOLOGY & ADDITIONAL TESTS: No new imaging  Results Reviewed: Yes  Imaging Personally Reviewed:  Electrocardiogram Personally Reviewed:    COORDINATION OF CARE:  Care Discussed with Consultants/Other Providers [Y/N]:  Prior or Outpatient Records Reviewed [Y/N]:

## 2024-09-15 NOTE — PROGRESS NOTE ADULT - ASSESSMENT
58 year old Kazakh-speaking female with a history of RA on MTX, HTN, HLD initially admitted to MICU for sepsis with acute renal failure 2/2 ESBL. E. coli bacteremia and UTI. Hospital course c/b acute encephalopathy and dysphagia.

## 2024-09-15 NOTE — PHYSICAL THERAPY INITIAL EVALUATION ADULT - NSPTDMEREC_GEN_A_CORE
Arrives via [place] with c/o fall and left shoulder pain. Swelling noted to left shoulder. Patient is A/Ox2 at baseline. Denies head and neck pain.    No DME needs at this time

## 2024-09-15 NOTE — SWALLOW BEDSIDE ASSESSMENT ADULT - SWALLOW EVAL: DIAGNOSIS
1. Moderate oral stage for minced and moist as characterized by reduced oral grading, slow/prolonged mastication of the solid (patient stated, "I have trouble chewing because of my teeth"), slow anterior-posterior transport, and adequate oral clearance. 2. Mild oral stage for puree, moderately-thick, mildly-thick, and thin liquids as characterized by reduced oral grading, reduced oral stripping of bolus from utensil for puree (which improved as trials progressed), slow anterior-posterior transport for puree, and adequate oral clearance. 3. Functional pharyngeal stage for minced and moist, puree, moderately-thick, mildly-thick, and thin liquids as characterized by suspected timely initiation of the pharyngeal swallow trigger and +hyolaryngeal excursion upon digital palpation. No overt s/sx aspiration/penetration evidenced post oral-intake accepted PO trials.

## 2024-09-15 NOTE — SWALLOW BEDSIDE ASSESSMENT ADULT - ASR SWALLOW RECOMMEND DIAG
2. Objective testing not indicated given presence of overt s/sx aspiration/penetration with thin & mildly-thick liquids; Recent chest imaging unremarkable for infection.
2. Objective testing not indicated at this time given recent chest imaging unremarkable for infection and patient without overt s/sx aspiration/penetration.

## 2024-09-15 NOTE — PHYSICAL THERAPY INITIAL EVALUATION ADULT - ASSISTIVE DEVICE:SUPINE/SIT, REHAB EVAL
Breath Ball twice a day for 10-15 minutes, as feasible (OK if you have to miss the occasional day)  Joice for Clinical Interventions Tina Ocampo) \"What, me Worry? \"
bed rails

## 2024-09-15 NOTE — SWALLOW BEDSIDE ASSESSMENT ADULT - ADDITIONAL RECOMMENDATIONS
Medical team advised to reconsult this service if there is change in patient's medical status or tolerance of recommended PO diet. This service to follow to assess/monitor diet tolerance/advancement.
Medical team advised to reconsult this service if patient demonstrates change in medical status impacting oral/nutritional intake and/or change in tolerance of recommended oral diet. This department to follow up as schedule permits to assess for diet tolerance.

## 2024-09-15 NOTE — PHYSICAL THERAPY INITIAL EVALUATION ADULT - MANUAL MUSCLE TESTING RESULTS, REHAB EVAL
unable to formally assess; pt unable to follow commands and begins swatting when wrist restraints are taken off
bilateral UE and LE at least 3/5 throughout

## 2024-09-15 NOTE — SWALLOW BEDSIDE ASSESSMENT ADULT - CONSISTENCIES ADMINISTERED
1 teaspoon/minced & moist 3 ounces/pureed ~2-3 ounces, each/thin liquid/moderately thick/mildly thick

## 2024-09-15 NOTE — PHYSICAL THERAPY INITIAL EVALUATION ADULT - LEVEL OF INDEPENDENCE: SIT/STAND, REHAB EVAL
Pt unable to adequately follow commands to perform sit<>stand transfer. Pt presents with limitations in sequencing and command following using rolling walker with support while standing, unable to understand use of handheld assist at this time. Will continue to attempt/unable to perform
Pt is unable to follow commands

## 2024-09-15 NOTE — PHYSICAL THERAPY INITIAL EVALUATION ADULT - PERTINENT HX OF CURRENT PROBLEM, REHAB EVAL
58 year old woman with past medical history stated below, presents for generalized weakness, altered mental status, fever/chill for 3-4 days.
58 year old woman, Estonian speaking, with past medical history stated below, presents for generalized weakness, altered mental status, fever/chill for 3-4 days.

## 2024-09-15 NOTE — SWALLOW BEDSIDE ASSESSMENT ADULT - ASR SWALLOW DENTITION
Natural dentition; Incomplete (patient stated, "I have trouble chewing because of my teeth").
Natural dentition; Incomplete.

## 2024-09-15 NOTE — PHYSICAL THERAPY INITIAL EVALUATION ADULT - NSPTDISCHREC_GEN_A_CORE
Discharge to rehab facility to address current functional limitation to optimize safety to allow patient to reach their optimal level of function.
Physical therapy evaluation performed. Patient not an appropriate rehab candidate nor for skilled PT intervention at this time. Patient unable to follow commands-->will not be placed on PT program at this time. Refer to Initial Evaluation document for more details./No skilled PT needs

## 2024-09-15 NOTE — SWALLOW BEDSIDE ASSESSMENT ADULT - SWALLOW EVAL: CURRENT DIET
Puree with Moderately-Thick Liquids (per MD order).
NPO with Tube Feeds via NGT (per active MD order).

## 2024-09-15 NOTE — PHYSICAL THERAPY INITIAL EVALUATION ADULT - DIAGNOSIS, PT EVAL
Decreased functional mobility; Decreased strength
Decreased functional mobility; Decreased balance; Generalized weakness

## 2024-09-15 NOTE — SWALLOW BEDSIDE ASSESSMENT ADULT - H & P REVIEW
Chart Note-MAR Accept 9/10: "MICU COURSE: t with symptoms c/f acidosis, acute renal failure, complicated UTI, and sepsis with gram negative bacteremia. Pt was started on bicarb gtt, BiPAP for increased wob, and Ertapenem x10 days. Metabolic acidosis improved, and pt subsequently monitored off BiPAP and bicarb. Course was c/b fever spikes and associated hypertension, improved with Tylenol and labetalol/hydralazine. Course c/b worsening LINA and AMS i/s/o UTI/sepsis and recent IV contrast. Nephro was consulted for hyperuremia, non-oliguria and recommended HD. Pt is now s/p HD x2, more awake and alert, VSS. CT head 9/9 with no acute changes."
yes

## 2024-09-15 NOTE — SWALLOW BEDSIDE ASSESSMENT ADULT - SLP PATIENT PROFILE REVIEW
yes
Chart Note-Event Note 9/10: "Patient pulled out NGT. Attempted to replace NGT for TF x2 without success. Patient very agitated, despite being in BL wrist restraints. Will endorse to day team to retry when family is present. Fingersticks to be monitored Q6 to evaluate for hypoglycemia. Care ongoing.

## 2024-09-15 NOTE — PROGRESS NOTE ADULT - ATTENDING COMMENTS
LINA s/p HD. HDC removed  retention overnight s/p garcia  monitor UOP and cr trends  avoid contrast studies, ACE-I, ARB, or NSAIDs   Further detailed plan of management and recommendation as outlined above.

## 2024-09-15 NOTE — PROGRESS NOTE ADULT - SUBJECTIVE AND OBJECTIVE BOX
[facility] Division of Kidney Diseases & Hypertension  FOLLOW UP NOTE  [phone]--------------------------------------------------------------------------------  Chief Complaint: LINA, Hypernatremia    24 hour events/subjective:  Pt. was seen and examined today. Pt. more alert and awake this morning. No acute overnight events. No new complaints. Pt reports feeling well.       PAST HISTORY  --------------------------------------------------------------------------------  No significant changes to PMH, PSH, FHx, SHx, unless otherwise noted    ALLERGIES & MEDICATIONS  --------------------------------------------------------------------------------  Allergies    No Known Allergies    Intolerances      Standing Inpatient Medications  amLODIPine   Tablet 5 milliGRAM(s) Oral daily  chlorhexidine 2% Cloths 1 Application(s) Topical <User Schedule>  dextrose 5%. 1000 milliLiter(s) IV Continuous <Continuous>  dextrose 5%. 1000 milliLiter(s) IV Continuous <Continuous>  dextrose 5%. 1000 milliLiter(s) IV Continuous <Continuous>  dextrose 50% Injectable 25 Gram(s) IV Push once  dextrose 50% Injectable 12.5 Gram(s) IV Push once  dextrose 50% Injectable 25 Gram(s) IV Push once  ertapenem  IVPB 500 milliGRAM(s) IV Intermittent every 24 hours  glucagon  Injectable 1 milliGRAM(s) IntraMuscular once  heparin   Injectable 5000 Unit(s) SubCutaneous every 8 hours  levothyroxine 25 MICROGram(s) Oral daily  Nephro-johny 1 Tablet(s) Oral daily  sevelamer carbonate 800 milliGRAM(s) Oral three times a day with meals    PRN Inpatient Medications  dextrose Oral Gel 15 Gram(s) Oral once PRN  sodium chloride 0.9% lock flush 10 milliLiter(s) IV Push every 1 hour PRN      REVIEW OF SYSTEMS  --------------------------------------------------------------------------------  Gen: No fevers/chills  Skin: No rashes  Head/Eyes/Ears/Mouth: No headache;  Respiratory: No dyspnea, cough  CV: No chest pain  GI: No abdominal pain  : No increased frequency  MSK: No joint pain/swelling, no edema  Neuro: No dizziness/lightheadedness, weakness      All other systems were reviewed and are negative, except as noted.    VITALS/PHYSICAL EXAM  --------------------------------------------------------------------------------  T(C): 36.8 (09-15-24 @ 12:27), Max: 37 (09-14-24 @ 16:38)  HR: 79 (09-15-24 @ 12:27) (79 - 92)  BP: 130/71 (09-15-24 @ 12:27) (127/58 - 147/92)  RR: 16 (09-15-24 @ 12:27) (16 - 18)  SpO2: 100% (09-15-24 @ 12:27) (98% - 100%)  Wt(kg): --        09-14-24 @ 07:01  -  09-15-24 @ 07:00  --------------------------------------------------------  IN: 350 mL / OUT: 2200 mL / NET: -1850 mL      Physical Exam:  Gen: resting   HEENT: Anicteric  Pulm: Fair air entry B/L  CV: S1S2+  Abd: Soft, +BS   Christian cath in place  Ext: LE trace edema B/L  Neuro: Awake, alert      LABS/STUDIES  --------------------------------------------------------------------------------              7.4    5.91  >-----------<  328      [09-15-24 @ 01:21]              23.3     140  |  109  |  59  ----------------------------<  81      [09-15-24 @ 06:01]  4.5   |  16  |  2.62        Ca     7.9     [09-15-24 @ 06:01]      Mg     2.00     [09-15-24 @ 06:01]      Phos  3.8     [09-15-24 @ 06:01]    Creatinine Trend:  SCr 2.62 [09-15 @ 06:01]  SCr 3.03 [09-14 @ 06:25]  SCr 3.41 [09-13 @ 07:13]  SCr 3.78 [09-12 @ 07:00]  SCr 3.95 [09-11 @ 06:40]    Iron 86, TIBC --, %sat --      [09-12-24 @ 07:00]  TSH 6.55      [09-12-24 @ 07:00]    HBsAb <3.0      [09-08-24 @ 19:49]  HBsAg Nonreact      [09-08-24 @ 19:49]  HBcAb Nonreact      [09-08-24 @ 19:49]  HCV 0.09, Nonreact      [09-08-24 @ 19:49]  HIV Nonreact      [09-12-24 @ 07:00]    Syphilis Screen (Treponema Pallidum Ab) Negative      [09-12-24 @ 07:00]

## 2024-09-15 NOTE — SWALLOW BEDSIDE ASSESSMENT ADULT - SWALLOW EVAL: RECOMMENDED FEEDING/EATING TECHNIQUES
frequent & meticulous oral hygiene; feed when alert, ONLY./allow for swallow between intakes/position upright (90 degrees)/small sips/bites
ongoing frequent and meticulous oral hygiene./allow for swallow between intakes/position upright (90 degrees)

## 2024-09-15 NOTE — PROGRESS NOTE ADULT - PROBLEM SELECTOR PLAN 1
Pt. with LINA in setting of UTI/sepsis/bacteremia and recent IV contrast use. Pt. with likely ATN. On admission, Scr was elevated at 7.02. Pt. initiated on HD on 9/8/24 due to altered mental status and concern for uremia. Pt. received second HD treatment on 9/9/24. LINA resolving now. Scr improved to 2.62 today. Christian cath was placed overnight. Pt. non-oliguric with 2200 cc UOP in last 24 hrs. No current plan for HD, removed HD catheter on 9/12/24.  Monitor labs and urine output. Avoid any potential nephrotoxins. Dose medications as per eGFR.

## 2024-09-15 NOTE — SWALLOW BEDSIDE ASSESSMENT ADULT - ASR SWALLOW REFERRAL
Recommend RD consult/follow-up to ensure adequate nutrition - patient may benefit from PO supplements (i.e., boost, ensure, magic cup) to maximize daily caloric intake./Registered Dietitian
Recommend RD consult/follow-up to ensure adequate nutrition; Patient may benefit from PO supplements to maximize daily caloric intake./Registered Dietitian

## 2024-09-16 LAB
ANION GAP SERPL CALC-SCNC: 13 MMOL/L — SIGNIFICANT CHANGE UP (ref 7–14)
BUN SERPL-MCNC: 48 MG/DL — HIGH (ref 7–23)
CALCIUM SERPL-MCNC: 8.5 MG/DL — SIGNIFICANT CHANGE UP (ref 8.4–10.5)
CHLORIDE SERPL-SCNC: 110 MMOL/L — HIGH (ref 98–107)
CO2 SERPL-SCNC: 16 MMOL/L — LOW (ref 22–31)
CREAT SERPL-MCNC: 2.54 MG/DL — HIGH (ref 0.5–1.3)
EGFR: 21 ML/MIN/1.73M2 — LOW
FLUAV AG NPH QL: SIGNIFICANT CHANGE UP
FLUBV AG NPH QL: SIGNIFICANT CHANGE UP
GLUCOSE SERPL-MCNC: 94 MG/DL — SIGNIFICANT CHANGE UP (ref 70–99)
HCT VFR BLD CALC: 24.4 % — LOW (ref 34.5–45)
HGB BLD-MCNC: 7.6 G/DL — LOW (ref 11.5–15.5)
MAGNESIUM SERPL-MCNC: 2.1 MG/DL — SIGNIFICANT CHANGE UP (ref 1.6–2.6)
MCHC RBC-ENTMCNC: 26.3 PG — LOW (ref 27–34)
MCHC RBC-ENTMCNC: 31.1 GM/DL — LOW (ref 32–36)
MCV RBC AUTO: 84.4 FL — SIGNIFICANT CHANGE UP (ref 80–100)
NRBC # BLD: 0 /100 WBCS — SIGNIFICANT CHANGE UP (ref 0–0)
NRBC # FLD: 0 K/UL — SIGNIFICANT CHANGE UP (ref 0–0)
PHOSPHATE SERPL-MCNC: 4.1 MG/DL — SIGNIFICANT CHANGE UP (ref 2.5–4.5)
PLATELET # BLD AUTO: 373 K/UL — SIGNIFICANT CHANGE UP (ref 150–400)
POTASSIUM SERPL-MCNC: 4.1 MMOL/L — SIGNIFICANT CHANGE UP (ref 3.5–5.3)
POTASSIUM SERPL-SCNC: 4.1 MMOL/L — SIGNIFICANT CHANGE UP (ref 3.5–5.3)
RBC # BLD: 2.89 M/UL — LOW (ref 3.8–5.2)
RBC # FLD: 18.7 % — HIGH (ref 10.3–14.5)
RSV RNA NPH QL NAA+NON-PROBE: SIGNIFICANT CHANGE UP
SARS-COV-2 RNA SPEC QL NAA+PROBE: SIGNIFICANT CHANGE UP
SODIUM SERPL-SCNC: 139 MMOL/L — SIGNIFICANT CHANGE UP (ref 135–145)
WBC # BLD: 5.79 K/UL — SIGNIFICANT CHANGE UP (ref 3.8–10.5)
WBC # FLD AUTO: 5.79 K/UL — SIGNIFICANT CHANGE UP (ref 3.8–10.5)

## 2024-09-16 PROCEDURE — 99232 SBSQ HOSP IP/OBS MODERATE 35: CPT

## 2024-09-16 PROCEDURE — 99233 SBSQ HOSP IP/OBS HIGH 50: CPT

## 2024-09-16 RX ADMIN — SEVELAMER CARBONATE 800 MILLIGRAM(S): 800 TABLET, FILM COATED ORAL at 16:45

## 2024-09-16 RX ADMIN — Medication 25 MICROGRAM(S): at 05:36

## 2024-09-16 RX ADMIN — SEVELAMER CARBONATE 800 MILLIGRAM(S): 800 TABLET, FILM COATED ORAL at 09:17

## 2024-09-16 RX ADMIN — Medication 5 MILLIGRAM(S): at 05:36

## 2024-09-16 RX ADMIN — CHLORHEXIDINE GLUCONATE ORAL RINSE 1 APPLICATION(S): 1.2 SOLUTION DENTAL at 05:42

## 2024-09-16 RX ADMIN — Medication 5000 UNIT(S): at 05:36

## 2024-09-16 RX ADMIN — MULTI VITAMIN/MINERAL SUPPLEMENT WITH ASCORBIC ACID, NIACIN, PYRIDOXINE, PANTOTHENIC ACID, FOLIC ACID, RIBOFLAVIN, THIAMIN, BIOTIN, COBALAMIN AND ZINC. 1 TABLET(S): 60; 20; 12.5; 10; 10; 1.7; 1.5; 1; .3; .006 TABLET, COATED ORAL at 12:57

## 2024-09-16 RX ADMIN — Medication 5000 UNIT(S): at 15:19

## 2024-09-16 RX ADMIN — SEVELAMER CARBONATE 800 MILLIGRAM(S): 800 TABLET, FILM COATED ORAL at 12:57

## 2024-09-16 RX ADMIN — Medication 5000 UNIT(S): at 22:01

## 2024-09-16 NOTE — PROGRESS NOTE ADULT - PROBLEM SELECTOR PLAN 1
Pt. with LINA in setting of UTI/sepsis/bacteremia and recent IV contrast use. Pt. with likely ATN. On admission, Scr was elevated at 7.02. Pt. initiated on HD on 9/8/24 due to altered mental status and concern for uremia. Pt. received second HD treatment on 9/9/24. LINA resolving now. Scr improved to 2.54 today. No current plan for HD, removed HD catheter on 9/12/24.  Monitor labs and urine output. Avoid any potential nephrotoxins. Dose medications as per eGFR.

## 2024-09-16 NOTE — PROGRESS NOTE ADULT - SUBJECTIVE AND OBJECTIVE BOX
[facility] Division of Kidney Diseases & Hypertension  FOLLOW UP NOTE  --------------------------------------------------------------------------------  Chief Complaint: LINA    24 hour events/subjective: Patient seen and evaluated this morning at bedside with fellow Dr. Anderson who used .  No pain.  feels better.      PAST HISTORY  --------------------------------------------------------------------------------  No significant changes to PMH, PSH, FHx, SHx, unless otherwise noted    ALLERGIES & MEDICATIONS  --------------------------------------------------------------------------------  Allergies    No Known Allergies    Intolerances      Standing Inpatient Medications  amLODIPine   Tablet 5 milliGRAM(s) Oral daily  chlorhexidine 2% Cloths 1 Application(s) Topical <User Schedule>  dextrose 5%. 1000 milliLiter(s) IV Continuous <Continuous>  dextrose 5%. 1000 milliLiter(s) IV Continuous <Continuous>  dextrose 50% Injectable 25 Gram(s) IV Push once  dextrose 50% Injectable 12.5 Gram(s) IV Push once  dextrose 50% Injectable 25 Gram(s) IV Push once  glucagon  Injectable 1 milliGRAM(s) IntraMuscular once  heparin   Injectable 5000 Unit(s) SubCutaneous every 8 hours  levothyroxine 25 MICROGram(s) Oral daily  Nephro-johny 1 Tablet(s) Oral daily  sevelamer carbonate 800 milliGRAM(s) Oral three times a day with meals    PRN Inpatient Medications  dextrose Oral Gel 15 Gram(s) Oral once PRN  sodium chloride 0.9% lock flush 10 milliLiter(s) IV Push every 1 hour PRN      REVIEW OF SYSTEMS  --------------------------------------------------------------------------------  Gen: no fever  Respiratory: no sob  CV: no cp  GI: no ab pain  : no complaints  MSK: no pain    VITALS/PHYSICAL EXAM  --------------------------------------------------------------------------------  T(C): 36.4 (09-16-24 @ 05:03), Max: 36.8 (09-15-24 @ 12:27)  HR: 86 (09-16-24 @ 05:03) (79 - 92)  BP: 125/66 (09-16-24 @ 05:03) (123/60 - 130/71)  ABP: --  ABP(mean): --  RR: 18 (09-16-24 @ 05:03) (16 - 18)  SpO2: 99% (09-16-24 @ 05:03) (99% - 100%)  CVP(mm Hg): --        09-15-24 @ 07:01  -  09-16-24 @ 07:00  --------------------------------------------------------  IN: 0 mL / OUT: 1200 mL / NET: -1200 mL    Physical Exam:  Gen: resting   HEENT: Anicteric  Pulm: Fair air entry B/L  CV: S1S2+  Abd: Soft, +BS   Christian cath in place  Ext: LE trace edema B/L  Neuro: Awake, alert    LABS/STUDIES  --------------------------------------------------------------------------------              7.6    5.79  >-----------<  373      [09-16-24 @ 06:55]              24.4     139  |  110  |  48  ----------------------------<  94      [09-16-24 @ 06:55]  4.1   |  16  |  2.54        Ca     8.5     [09-16-24 @ 06:55]      Mg     2.10     [09-16-24 @ 06:55]      Phos  4.1     [09-16-24 @ 06:55]            Creatinine Trend:  SCr 2.54 [09-16 @ 06:55]  SCr 2.62 [09-15 @ 06:01]  SCr 3.03 [09-14 @ 06:25]  SCr 3.41 [09-13 @ 07:13]  SCr 3.78 [09-12 @ 07:00]    Urinalysis - [09-16-24 @ 06:55]      Color  / Appearance  / SG  / pH       Gluc 94 / Ketone   / Bili  / Urobili        Blood  / Protein  / Leuk Est  / Nitrite       RBC  / WBC  / Hyaline  / Gran  / Sq Epi  / Non Sq Epi  / Bacteria       Iron 86, TIBC --, %sat --      [09-12-24 @ 07:00]  TSH 6.55      [09-12-24 @ 07:00]    HIV Nonreact      [09-12-24 @ 07:00]    Syphilis Screen (Treponema Pallidum Ab) Negative      [09-12-24 @ 07:00]

## 2024-09-16 NOTE — PROGRESS NOTE ADULT - SUBJECTIVE AND OBJECTIVE BOX
PROGRESS NOTE:     Patient is a 58y old  Female who presents with a chief complaint of urgent HD (16 Sep 2024 09:56)      SUBJECTIVE / OVERNIGHT EVENTS: No acute events.     ADDITIONAL REVIEW OF SYSTEMS:    MEDICATIONS  (STANDING):  amLODIPine   Tablet 5 milliGRAM(s) Oral daily  chlorhexidine 2% Cloths 1 Application(s) Topical <User Schedule>  dextrose 5%. 1000 milliLiter(s) (50 mL/Hr) IV Continuous <Continuous>  dextrose 5%. 1000 milliLiter(s) (100 mL/Hr) IV Continuous <Continuous>  dextrose 50% Injectable 25 Gram(s) IV Push once  dextrose 50% Injectable 12.5 Gram(s) IV Push once  dextrose 50% Injectable 25 Gram(s) IV Push once  glucagon  Injectable 1 milliGRAM(s) IntraMuscular once  heparin   Injectable 5000 Unit(s) SubCutaneous every 8 hours  levothyroxine 25 MICROGram(s) Oral daily  Nephro-johny 1 Tablet(s) Oral daily  sevelamer carbonate 800 milliGRAM(s) Oral three times a day with meals    MEDICATIONS  (PRN):  dextrose Oral Gel 15 Gram(s) Oral once PRN Blood Glucose LESS THAN 70 milliGRAM(s)/deciliter  sodium chloride 0.9% lock flush 10 milliLiter(s) IV Push every 1 hour PRN Pre/post blood products, medications, blood draw, and to maintain line patency      CAPILLARY BLOOD GLUCOSE        I&O's Summary    15 Sep 2024 07:01  -  16 Sep 2024 07:00  --------------------------------------------------------  IN: 0 mL / OUT: 1200 mL / NET: -1200 mL        PHYSICAL EXAM:  Vital Signs Last 24 Hrs  T(C): 36.4 (16 Sep 2024 05:03), Max: 36.7 (15 Sep 2024 20:39)  T(F): 97.5 (16 Sep 2024 05:03), Max: 98.1 (15 Sep 2024 20:39)  HR: 86 (16 Sep 2024 05:03) (86 - 92)  BP: 125/66 (16 Sep 2024 05:03) (123/60 - 125/66)  BP(mean): --  RR: 18 (16 Sep 2024 05:03) (18 - 18)  SpO2: 99% (16 Sep 2024 05:03) (99% - 100%)    Parameters below as of 16 Sep 2024 05:03  Patient On (Oxygen Delivery Method): room air      CONSTITUTIONAL: NAD  EYES: Conjunctiva and sclera clear  ENMT: Dry oral mucosa, poor oral hygiene suspect due to betel nut chewing  RESPIRATORY: Normal respiratory effort; lungs are clear to auscultation bilaterally  CARDIOVASCULAR: Regular rate and rhythm, normal S1 and S2, no murmur/rub/gallop  ABDOMEN: Soft, nontender to palpation, normoactive bowel sounds  : Christian  PSYCH: A+O to person  NEUROLOGY: Moves all extremities       LABS:                        7.6    5.79  )-----------( 373      ( 16 Sep 2024 06:55 )             24.4     09-16    139  |  110<H>  |  48<H>  ----------------------------<  94  4.1   |  16<L>  |  2.54<H>    Ca    8.5      16 Sep 2024 06:55  Phos  4.1     09-16  Mg     2.10     09-16            Urinalysis Basic - ( 16 Sep 2024 06:55 )    Color: x / Appearance: x / SG: x / pH: x  Gluc: 94 mg/dL / Ketone: x  / Bili: x / Urobili: x   Blood: x / Protein: x / Nitrite: x   Leuk Esterase: x / RBC: x / WBC x   Sq Epi: x / Non Sq Epi: x / Bacteria: x          RADIOLOGY & ADDITIONAL TESTS:  Results Reviewed:   Imaging Personally Reviewed:  Electrocardiogram Personally Reviewed:    COORDINATION OF CARE:  Care Discussed with Consultants/Other Providers [Y/N]:  Prior or Outpatient Records Reviewed [Y/N]:

## 2024-09-16 NOTE — PROGRESS NOTE ADULT - ASSESSMENT
58 year old Slovenian-speaking female with a history of RA on MTX, HTN, HLD initially admitted to MICU for sepsis with acute renal failure 2/2 ESBL. E. coli bacteremia and UTI. Hospital course c/b acute encephalopathy and dysphagia.

## 2024-09-16 NOTE — PROGRESS NOTE ADULT - PROBLEM SELECTOR PLAN 2
Pt. with hypernatremia in setting of decreased free water intake. SNa improved to 139 today. Monitor SNa

## 2024-09-17 DIAGNOSIS — D64.9 ANEMIA, UNSPECIFIED: ICD-10-CM

## 2024-09-17 LAB
ANION GAP SERPL CALC-SCNC: 10 MMOL/L — SIGNIFICANT CHANGE UP (ref 7–14)
BUN SERPL-MCNC: 41 MG/DL — HIGH (ref 7–23)
CALCIUM SERPL-MCNC: 8.2 MG/DL — LOW (ref 8.4–10.5)
CHLORIDE SERPL-SCNC: 109 MMOL/L — HIGH (ref 98–107)
CO2 SERPL-SCNC: 17 MMOL/L — LOW (ref 22–31)
CREAT SERPL-MCNC: 2.43 MG/DL — HIGH (ref 0.5–1.3)
EGFR: 22 ML/MIN/1.73M2 — LOW
GLUCOSE SERPL-MCNC: 83 MG/DL — SIGNIFICANT CHANGE UP (ref 70–99)
HCT VFR BLD CALC: 25.7 % — LOW (ref 34.5–45)
HGB BLD-MCNC: 7.8 G/DL — LOW (ref 11.5–15.5)
HOMOCYSTEINE LEVEL: 12.2 UMOL/L — SIGNIFICANT CHANGE UP (ref 0–14.5)
MAGNESIUM SERPL-MCNC: 2.3 MG/DL — SIGNIFICANT CHANGE UP (ref 1.6–2.6)
MCHC RBC-ENTMCNC: 26.9 PG — LOW (ref 27–34)
MCHC RBC-ENTMCNC: 30.4 GM/DL — LOW (ref 32–36)
MCV RBC AUTO: 88.6 FL — SIGNIFICANT CHANGE UP (ref 80–100)
METHYLMALONATE SERPL-SCNC: 741 NMOL/L — HIGH (ref 0–378)
NRBC # BLD: 0 /100 WBCS — SIGNIFICANT CHANGE UP (ref 0–0)
NRBC # FLD: 0 K/UL — SIGNIFICANT CHANGE UP (ref 0–0)
PHOSPHATE SERPL-MCNC: 3.7 MG/DL — SIGNIFICANT CHANGE UP (ref 2.5–4.5)
PLATELET # BLD AUTO: 399 K/UL — SIGNIFICANT CHANGE UP (ref 150–400)
POTASSIUM SERPL-MCNC: 4.5 MMOL/L — SIGNIFICANT CHANGE UP (ref 3.5–5.3)
POTASSIUM SERPL-SCNC: 4.5 MMOL/L — SIGNIFICANT CHANGE UP (ref 3.5–5.3)
RBC # BLD: 2.9 M/UL — LOW (ref 3.8–5.2)
RBC # FLD: 18.9 % — HIGH (ref 10.3–14.5)
SODIUM SERPL-SCNC: 136 MMOL/L — SIGNIFICANT CHANGE UP (ref 135–145)
WBC # BLD: 6.61 K/UL — SIGNIFICANT CHANGE UP (ref 3.8–10.5)
WBC # FLD AUTO: 6.61 K/UL — SIGNIFICANT CHANGE UP (ref 3.8–10.5)
ZINC SERPL-MCNC: 72 UG/DL — SIGNIFICANT CHANGE UP (ref 44–115)

## 2024-09-17 PROCEDURE — 99232 SBSQ HOSP IP/OBS MODERATE 35: CPT

## 2024-09-17 PROCEDURE — 99222 1ST HOSP IP/OBS MODERATE 55: CPT

## 2024-09-17 PROCEDURE — 93010 ELECTROCARDIOGRAM REPORT: CPT

## 2024-09-17 RX ORDER — OLANZAPINE 2.5 MG
1.25 TABLET ORAL ONCE
Refills: 0 | Status: COMPLETED | OUTPATIENT
Start: 2024-09-17 | End: 2024-09-17

## 2024-09-17 RX ORDER — 5-HYDROXYTRYPTOPHAN (5-HTP) 100 MG
3 TABLET,DISINTEGRATING ORAL AT BEDTIME
Refills: 0 | Status: DISCONTINUED | OUTPATIENT
Start: 2024-09-17 | End: 2024-09-22

## 2024-09-17 RX ADMIN — Medication 1.25 MILLIGRAM(S): at 20:31

## 2024-09-17 RX ADMIN — Medication 25 MICROGRAM(S): at 04:55

## 2024-09-17 RX ADMIN — Medication 3 MILLIGRAM(S): at 20:33

## 2024-09-17 RX ADMIN — Medication 5000 UNIT(S): at 20:44

## 2024-09-17 RX ADMIN — SEVELAMER CARBONATE 800 MILLIGRAM(S): 800 TABLET, FILM COATED ORAL at 17:02

## 2024-09-17 RX ADMIN — SEVELAMER CARBONATE 800 MILLIGRAM(S): 800 TABLET, FILM COATED ORAL at 09:05

## 2024-09-17 RX ADMIN — Medication 5 MILLIGRAM(S): at 04:55

## 2024-09-17 RX ADMIN — CHLORHEXIDINE GLUCONATE ORAL RINSE 1 APPLICATION(S): 1.2 SOLUTION DENTAL at 04:55

## 2024-09-17 RX ADMIN — Medication 5000 UNIT(S): at 12:00

## 2024-09-17 RX ADMIN — MULTI VITAMIN/MINERAL SUPPLEMENT WITH ASCORBIC ACID, NIACIN, PYRIDOXINE, PANTOTHENIC ACID, FOLIC ACID, RIBOFLAVIN, THIAMIN, BIOTIN, COBALAMIN AND ZINC. 1 TABLET(S): 60; 20; 12.5; 10; 10; 1.7; 1.5; 1; .3; .006 TABLET, COATED ORAL at 09:05

## 2024-09-17 RX ADMIN — SEVELAMER CARBONATE 800 MILLIGRAM(S): 800 TABLET, FILM COATED ORAL at 12:00

## 2024-09-17 RX ADMIN — Medication 5000 UNIT(S): at 04:55

## 2024-09-17 NOTE — PROGRESS NOTE ADULT - PROBLEM SELECTOR PLAN 2
Urine and blood cultures with ESBL E. coli  -CT abdomen/pelvis with pyelonephritis  -seen by ID   -completed course of ertapenem 9/16

## 2024-09-17 NOTE — PHARMACOTHERAPY INTERVENTION NOTE - COMMENTS
Spoke with patient's son (Vladislav) over the phone to discuss the new medications started for the patient while inpatient. Educated patient's son on levothyroxine, its indication, how to take it (on an empty stomach in the morning at least one hour before breakfast), and to separate it by at least 4 hours from any vitamins. Counseled patient's son on amlodipine, its indication, and side effects. Informed patient's son that the patient's home losartan was discontinued due to her poor renal function. Patient's son verbalized understanding of all counseling points made.       Teresa Murcia, PharmD   PGY-1 Pharmacy Resident

## 2024-09-17 NOTE — PROGRESS NOTE ADULT - PROBLEM SELECTOR PLAN 1
Pt. with LINA in setting of UTI/sepsis/bacteremia and recent IV contrast use. Pt. with likely ATN. On admission, Scr was elevated at 7.02. Pt. initiated on HD on 9/8/24 due to altered mental status and concern for uremia. Pt. received second HD treatment on 9/9/24. LINA resolving now. Scr improved to 2.43 today. No current plan for HD, HD catheter was removed on 9/12/24.  Monitor labs and urine output. Avoid any potential nephrotoxins. Dose medications as per eGFR.

## 2024-09-17 NOTE — PROGRESS NOTE ADULT - ASSESSMENT
58 year old Malay-speaking female with a history of RA on MTX, HTN, HLD initially admitted to MICU for sepsis with acute renal failure 2/2 ESBL. E. coli bacteremia and UTI. Hospital course c/b acute encephalopathy and dysphagia.

## 2024-09-17 NOTE — PROGRESS NOTE ADULT - SUBJECTIVE AND OBJECTIVE BOX
PROGRESS NOTE:     Patient is a 58y old  Female who presents with a chief complaint of urgent HD (17 Sep 2024 11:35)      SUBJECTIVE / OVERNIGHT EVENTS: No acute events.     ADDITIONAL REVIEW OF SYSTEMS:    MEDICATIONS  (STANDING):  amLODIPine   Tablet 5 milliGRAM(s) Oral daily  chlorhexidine 2% Cloths 1 Application(s) Topical <User Schedule>  dextrose 5%. 1000 milliLiter(s) (100 mL/Hr) IV Continuous <Continuous>  dextrose 5%. 1000 milliLiter(s) (50 mL/Hr) IV Continuous <Continuous>  dextrose 50% Injectable 25 Gram(s) IV Push once  dextrose 50% Injectable 25 Gram(s) IV Push once  dextrose 50% Injectable 12.5 Gram(s) IV Push once  glucagon  Injectable 1 milliGRAM(s) IntraMuscular once  heparin   Injectable 5000 Unit(s) SubCutaneous every 8 hours  levothyroxine 25 MICROGram(s) Oral daily  Nephro-johny 1 Tablet(s) Oral daily  sevelamer carbonate 800 milliGRAM(s) Oral three times a day with meals    MEDICATIONS  (PRN):  dextrose Oral Gel 15 Gram(s) Oral once PRN Blood Glucose LESS THAN 70 milliGRAM(s)/deciliter  sodium chloride 0.9% lock flush 10 milliLiter(s) IV Push every 1 hour PRN Pre/post blood products, medications, blood draw, and to maintain line patency      CAPILLARY BLOOD GLUCOSE        I&O's Summary    16 Sep 2024 07:01  -  17 Sep 2024 07:00  --------------------------------------------------------  IN: 0 mL / OUT: 700 mL / NET: -700 mL        PHYSICAL EXAM:  Vital Signs Last 24 Hrs  T(C): 36.9 (17 Sep 2024 11:55), Max: 36.9 (17 Sep 2024 05:07)  T(F): 98.5 (17 Sep 2024 11:55), Max: 98.5 (17 Sep 2024 11:55)  HR: 85 (17 Sep 2024 11:55) (75 - 85)  BP: 125/65 (17 Sep 2024 11:55) (117/61 - 127/50)  BP(mean): --  RR: 18 (17 Sep 2024 11:55) (18 - 20)  SpO2: 100% (17 Sep 2024 11:55) (98% - 100%)    Parameters below as of 17 Sep 2024 05:07  Patient On (Oxygen Delivery Method): room air      CONSTITUTIONAL: NAD  EYES: Conjunctiva and sclera clear  ENMT: Dry oral mucosa, poor oral hygiene suspect due to betel nut chewing  RESPIRATORY: Normal respiratory effort; lungs are clear to auscultation bilaterally  CARDIOVASCULAR: Regular rate and rhythm, normal S1 and S2, no murmur/rub/gallop  ABDOMEN: Soft, nontender to palpation, normoactive bowel sounds  : Christian  PSYCH: A+O to person  NEUROLOGY: Moves all extremities     LABS:                        7.8    6.61  )-----------( 399      ( 17 Sep 2024 07:00 )             25.7     09-17    136  |  109[H]  |  41[H]  ----------------------------<  83  4.5   |  17[L]  |  2.43[H]    Ca    8.2[L]      17 Sep 2024 07:00  Phos  3.7     09-17  Mg     2.30     09-17            Urinalysis Basic - ( 17 Sep 2024 07:00 )    Color: x / Appearance: x / SG: x / pH: x  Gluc: 83 mg/dL / Ketone: x  / Bili: x / Urobili: x   Blood: x / Protein: x / Nitrite: x   Leuk Esterase: x / RBC: x / WBC x   Sq Epi: x / Non Sq Epi: x / Bacteria: x          RADIOLOGY & ADDITIONAL TESTS:  Results Reviewed:   Imaging Personally Reviewed:  Electrocardiogram Personally Reviewed:    COORDINATION OF CARE:  Care Discussed with Consultants/Other Providers [Y/N]:  Prior or Outpatient Records Reviewed [Y/N]:

## 2024-09-17 NOTE — CONSULT NOTE ADULT - SUBJECTIVE AND OBJECTIVE BOX
Patient is a 58y old  Female who presents with a chief complaint of urgent HD (16 Sep 2024 15:21)      HPI:  Ms. Aggie Call is a 59yo woman, Lao speaking, h/o rheumatoid arthritis on MTX, HTN, HLD, and GERD who presents for generalized weakness, AMS, fever/chill for 3-4 days. Per son, patient was given Cipro by PCP for symptoms c/f UTI about 10 days ago, after which she has been becoming more confused, lethargic, throat pain with worsening PO intake and self-discontinued medications for 3 days.    In the ED, pt was found to be febrile, tachypneic, /107, and increased WOB. Labs notable for K 6.9 (hemolyzed), repeat 5.9 with no significant change on EKG, Scr 6.86, VBG 7.23/30/35/12, repeat VBG 7.16/43/33/13. CT showed Cystitis with ascending ureteritis and pyelonephritis. Pt was given Lokelma, Ca-gluconate, and insulin/detrose for hyperkalemia, LR and Zosyn in the setting of UTI, and placed on bicarb ggt for acidosis.     Pt was admitted to MICU for possible urgent HD and continued monitoring given worsening acidosis on repeat vbg.   (06 Sep 2024 01:47)    seen by SLP, recommended for puree with thin liquids  Lao :     REVIEW OF SYSTEMS  Constitutional - No fever, No weight loss, No fatigue  HEENT - No eye pain, No visual disturbances, No difficulty hearing, No tinnitus, No vertigo, No neck pain  Respiratory - No cough, No wheezing, No shortness of breath  Cardiovascular - No chest pain, No palpitations  Gastrointestinal - No abdominal pain, No nausea, No vomiting, No diarrhea, No constipation  Genitourinary - No dysuria, No frequency, No hematuria, No incontinence  Neurological - No headaches, No memory loss, No loss of strength, No numbness, No tremors  Skin - No itching, No rashes, No lesions   Endocrine - No temperature intolerance  Musculoskeletal - No joint pain, No joint swelling, No muscle pain  Psychiatric - No depression, No anxiety    PAST MEDICAL & SURGICAL HISTORY  HTN (hypertension)        SOCIAL HISTORY  Smoking - Denied  EtOH - Denied   Drugs - Denied    FUNCTIONAL HISTORY  Lives alone in apartment with elevator  Independent at baseline    CURRENT FUNCTIONAL STATUS  9/15   Bed Mobility: Sit to Supine:     · Level of [place]	moderate assist (50% patients effort)  · Physical Assist/Nonphysical Assist	verbal cues; nonverbal cues (demo/gestures); 1 person assist  · Assistive Device	bed rails    Bed Mobility: Supine to Sit:     · Level of [place]	moderate assist (50% patients effort)  · Physical Assist/Nonphysical Assist	verbal cues; nonverbal cues (demo/gestures); 1 person assist  · Assistive Device	bed rails    Bed Mobility Analysis:     · Bed Mobility Limitations	decreased ability to use arms for pushing/pulling; decreased ability to use legs for bridging/pushing; impaired ability to control trunk for mobility  · Impairments Contributing to Impaired Bed Mobility	impaired balance; decreased strength; impaired postural control    Transfer: Sit to Stand:     · Level of [place]	unable to perform; Pt unable to adequately follow commands to perform sit<>stand transfer. Pt presents with limitations in sequencing and command following using rolling walker with support while standing, unable to understand use of handheld assist at this time. Will continue to attempt      FAMILY HISTORY       RECENT LABS/IMAGING  CBC Full  -  ( 17 Sep 2024 07:00 )  WBC Count : 6.61 K/uL  RBC Count : 2.90 M/uL  Hemoglobin : 7.8 g/dL  Hematocrit : 25.7 %  Platelet Count - Automated : 399 K/uL  Mean Cell Volume : 88.6 fL  Mean Cell Hemoglobin : 26.9 pg  Mean Cell Hemoglobin Concentration : 30.4 gm/dL  Auto Neutrophil # : x  Auto Lymphocyte # : x  Auto Monocyte # : x  Auto Eosinophil # : x  Auto Basophil # : x  Auto Neutrophil % : x  Auto Lymphocyte % : x  Auto Monocyte % : x  Auto Eosinophil % : x  Auto Basophil % : x    09-17    136  |  109[H]  |  41[H]  ----------------------------<  83  4.5   |  17[L]  |  2.43[H]    Ca    8.2[L]      17 Sep 2024 07:00  Phos  3.7     09-17  Mg     2.30     09-17      Urinalysis Basic - ( 17 Sep 2024 07:00 )    Color: x / Appearance: x / SG: x / pH: x  Gluc: 83 mg/dL / Ketone: x  / Bili: x / Urobili: x   Blood: x / Protein: x / Nitrite: x   Leuk Esterase: x / RBC: x / WBC x   Sq Epi: x / Non Sq Epi: x / Bacteria: x        VITALS  T(C): 36.9 (09-17-24 @ 05:07), Max: 36.9 (09-17-24 @ 05:07)  HR: 82 (09-17-24 @ 05:07) (75 - 82)  BP: 117/61 (09-17-24 @ 05:07) (117/61 - 127/50)  RR: 18 (09-17-24 @ 05:07) (18 - 20)  SpO2: 100% (09-17-24 @ 05:07) (98% - 100%)  Wt(kg): --    ALLERGIES  No Known Allergies      MEDICATIONS   amLODIPine   Tablet 5 milliGRAM(s) Oral daily  chlorhexidine 2% Cloths 1 Application(s) Topical <User Schedule>  dextrose 5%. 1000 milliLiter(s) IV Continuous <Continuous>  dextrose 5%. 1000 milliLiter(s) IV Continuous <Continuous>  dextrose 50% Injectable 25 Gram(s) IV Push once  dextrose 50% Injectable 12.5 Gram(s) IV Push once  dextrose 50% Injectable 25 Gram(s) IV Push once  dextrose Oral Gel 15 Gram(s) Oral once PRN  glucagon  Injectable 1 milliGRAM(s) IntraMuscular once  heparin   Injectable 5000 Unit(s) SubCutaneous every 8 hours  levothyroxine 25 MICROGram(s) Oral daily  Nephro-johny 1 Tablet(s) Oral daily  sevelamer carbonate 800 milliGRAM(s) Oral three times a day with meals  sodium chloride 0.9% lock flush 10 milliLiter(s) IV Push every 1 hour PRN      ----------------------------------------------------------------------------------------  PHYSICAL EXAM  Constitutional - NAD, Comfortable  HEENT - NCAT, EOMI  Neck - Supple, No limited ROM  Chest - CTA bilaterally, No wheeze, No rhonchi, No crackles  Cardiovascular - RRR, S1S2, No murmurs  Abdomen - BS+, Soft, NTND  Extremities - No C/C/E, No calf tenderness   Neurologic Exam -                    Cognitive - Awake, Alert, AAO to self, place, date, year, situation     Communication - Fluent, No dysarthria     Cranial Nerves - CN 2-12 intact     Motor - No focal deficits                    LEFT    UE - ShAB 5/5, EF 5/5, EE 5/5, WE 5/5,  5/5                    RIGHT UE - ShAB 5/5, EF 5/5, EE 5/5, WE 5/5,  5/5                    LEFT    LE - HF 5/5, KE 5/5, DF 5/5, PF 5/5                    RIGHT LE - HF 5/5, KE 5/5, DF 5/5, PF 5/5        Sensory - Intact to LT     Reflexes - DTR Intact, No primitive reflexive     Coordination - FTN intact     OculoVestibular - No saccades, No nystagmus, VOR         Balance - WNL Static  Psychiatric - Mood stable, Affect WNL  ----------------------------------------------------------------------------------------  ASSESSMENT/PLAN    Pain -  DVT PPX -   Rehab -     incomplete note, consult in progress Patient is a 58y old  Female who presents with a chief complaint of urgent HD (16 Sep 2024 15:21)      HPI:  Ms. Aggie Call is a 57yo woman, Luxembourgish speaking, h/o rheumatoid arthritis on MTX, HTN, HLD, and GERD who presents for generalized weakness, AMS, fever/chill for 3-4 days. Per son, patient was given Cipro by PCP for symptoms c/f UTI about 10 days ago, after which she has been becoming more confused, lethargic, throat pain with worsening PO intake and self-discontinued medications for 3 days.    In the ED, pt was found to be febrile, tachypneic, /107, and increased WOB. Labs notable for K 6.9 (hemolyzed), repeat 5.9 with no significant change on EKG, Scr 6.86, VBG 7.23/30/35/12, repeat VBG 7.16/43/33/13. CT showed Cystitis with ascending ureteritis and pyelonephritis. Pt was given Lokelma, Ca-gluconate, and insulin/detrose for hyperkalemia, LR and Zosyn in the setting of UTI, and placed on bicarb ggt for acidosis.     Pt was admitted to MICU for possible urgent HD and continued monitoring given worsening acidosis on repeat vbg.   (06 Sep 2024 01:47)    seen by SLP, recommended for puree with thin liquids  Lux[place]ish : ID [id number]  patient on isolation precautions for covid exposure    REVIEW OF SYSTEMS  weakness    PAST MEDICAL & SURGICAL HISTORY  HTN (hypertension)       FUNCTIONAL HISTORY  per chart patient lives alone in apartment with elevator and was Independent at baseline    CURRENT FUNCTIONAL STATUS  9/15   Bed Mobility: Sit to Supine:     · Level of [place]	moderate assist (50% patients effort)  · Physical Assist/Nonphysical Assist	verbal cues; nonverbal cues (demo/gestures); 1 person assist  · Assistive Device	bed rails    Bed Mobility: Supine to Sit:     · Level of [place]	moderate assist (50% patients effort)  · Physical Assist/Nonphysical Assist	verbal cues; nonverbal cues (demo/gestures); 1 person assist  · Assistive Device	bed rails    Bed Mobility Analysis:     · Bed Mobility Limitations	decreased ability to use arms for pushing/pulling; decreased ability to use legs for bridging/pushing; impaired ability to control trunk for mobility  · Impairments Contributing to Impaired Bed Mobility	impaired balance; decreased strength; impaired postural control    Transfer: Sit to Stand:     · Level of [place]	unable to perform; Pt unable to adequately follow commands to perform sit<>stand transfer. Pt presents with limitations in sequencing and command following using rolling walker with support while standing, unable to understand use of handheld assist at this time. Will continue to attempt         RECENT LABS/IMAGING  CBC Full  -  ( 17 Sep 2024 07:00 )  WBC Count : 6.61 K/uL  RBC Count : 2.90 M/uL  Hemoglobin : 7.8 g/dL  Hematocrit : 25.7 %  Platelet Count - Automated : 399 K/uL  Mean Cell Volume : 88.6 fL  Mean Cell Hemoglobin : 26.9 pg  Mean Cell Hemoglobin Concentration : 30.4 gm/dL  Auto Neutrophil # : x  Auto Lymphocyte # : x  Auto Monocyte # : x  Auto Eosinophil # : x  Auto Basophil # : x  Auto Neutrophil % : x  Auto Lymphocyte % : x  Auto Monocyte % : x  Auto Eosinophil % : x  Auto Basophil % : x    09-17    136  |  109[H]  |  41[H]  ----------------------------<  83  4.5   |  17[L]  |  2.43[H]    Ca    8.2[L]      17 Sep 2024 07:00  Phos  3.7     09-17  Mg     2.30     09-17      Urinalysis Basic - ( 17 Sep 2024 07:00 )    Color: x / Appearance: x / SG: x / pH: x  Gluc: 83 mg/dL / Ketone: x  / Bili: x / Urobili: x   Blood: x / Protein: x / Nitrite: x   Leuk Esterase: x / RBC: x / WBC x   Sq Epi: x / Non Sq Epi: x / Bacteria: x        VITALS  T(C): 36.9 (09-17-24 @ 05:07), Max: 36.9 (09-17-24 @ 05:07)  HR: 82 (09-17-24 @ 05:07) (75 - 82)  BP: 117/61 (09-17-24 @ 05:07) (117/61 - 127/50)  RR: 18 (09-17-24 @ 05:07) (18 - 20)  SpO2: 100% (09-17-24 @ 05:07) (98% - 100%)  Wt(kg): --    ALLERGIES  No Known Allergies      MEDICATIONS   amLODIPine   Tablet 5 milliGRAM(s) Oral daily  chlorhexidine 2% Cloths 1 Application(s) Topical <User Schedule>  dextrose 5%. 1000 milliLiter(s) IV Continuous <Continuous>  dextrose 5%. 1000 milliLiter(s) IV Continuous <Continuous>  dextrose 50% Injectable 25 Gram(s) IV Push once  dextrose 50% Injectable 12.5 Gram(s) IV Push once  dextrose 50% Injectable 25 Gram(s) IV Push once  dextrose Oral Gel 15 Gram(s) Oral once PRN  glucagon  Injectable 1 milliGRAM(s) IntraMuscular once  heparin   Injectable 5000 Unit(s) SubCutaneous every 8 hours  levothyroxine 25 MICROGram(s) Oral daily  Nephro-johny 1 Tablet(s) Oral daily  sevelamer carbonate 800 milliGRAM(s) Oral three times a day with meals  sodium chloride 0.9% lock flush 10 milliLiter(s) IV Push every 1 hour PRN      ----------------------------------------------------------------------------------------  PHYSICAL EXAM  Constitutional - NAD, Comfortable  HEENT - NCAT, EOMI   Chest - no respiratory distress  Cardiovascular - RRR, S1S2   Abdomen - BS+, Soft, NTND  Extremities - No C/C/E, No calf tenderness   Neurologic Exam -                    Cognitive - Awake, Alert but not answering orientation questions with      Communication - Fluent, No dysarthria     Cranial Nerves - CN 2-12 intact     Motor -   limited command following, does not want to unwrap her blanket, reports chills but no fever                    LEFT    UE - 4/5                    RIGHT UE - 4/5                    LEFT    LE - 4/5                    RIGHT LE - 4/5        Sensory - Intact to LT          Balance - WNL Static  Psychiatric - Mood stable, Affect WNL  ----------------------------------------------------------------------------------------  ASSESSMENT/PLAN  58 year old Hebrew speaking female, admitted for pyelonephritis, on isolation due to covid exposure    Pain -denies  garcia  DVT PPX - heparin  Rehab - continue bedside therapy, will monitor for improvement    incomplete note, consult in progress

## 2024-09-17 NOTE — PROGRESS NOTE ADULT - PROBLEM SELECTOR PLAN 2
Pt. with hypernatremia in setting of decreased free water intake. SNa stable at 136 today. Monitor SNa    If you have any questions, please feel free to contact me  Chris Godinez  Nephrology  [phone] / [employer] Teams(Preferred)  (After 4 pm or on weekends please page the on-call fellow)

## 2024-09-17 NOTE — PROGRESS NOTE ADULT - TIME BILLING
Patient encounter, including chart review, medication review, patient interview, ordering labs and medications, interpreting labs and imaging results, and coordination of care with consultants

## 2024-09-17 NOTE — PROGRESS NOTE ADULT - SUBJECTIVE AND OBJECTIVE BOX
[facility] Division of Kidney Diseases & Hypertension  FOLLOW UP NOTE  [phone]--------------------------------------------------------------------------------    Chief Complaint: LINA    24 hour events/subjective: Pt. was seen and evaluated at bedside earlier today. Resting comfortably. Pt. appears confused despite the use of language line Irish  (ID: [id number]). Unable to obtain ROS.    PAST HISTORY  --------------------------------------------------------------------------------  No significant changes to PMH, PSH, FHx, SHx, unless otherwise noted    ALLERGIES & MEDICATIONS  --------------------------------------------------------------------------------  Allergies    No Known Allergies    Intolerances      Standing Inpatient Medications  amLODIPine   Tablet 5 milliGRAM(s) Oral daily  chlorhexidine 2% Cloths 1 Application(s) Topical <User Schedule>  dextrose 5%. 1000 milliLiter(s) IV Continuous <Continuous>  dextrose 5%. 1000 milliLiter(s) IV Continuous <Continuous>  dextrose 50% Injectable 25 Gram(s) IV Push once  dextrose 50% Injectable 12.5 Gram(s) IV Push once  dextrose 50% Injectable 25 Gram(s) IV Push once  glucagon  Injectable 1 milliGRAM(s) IntraMuscular once  heparin   Injectable 5000 Unit(s) SubCutaneous every 8 hours  levothyroxine 25 MICROGram(s) Oral daily  Nephro-johny 1 Tablet(s) Oral daily  sevelamer carbonate 800 milliGRAM(s) Oral three times a day with meals    PRN Inpatient Medications  dextrose Oral Gel 15 Gram(s) Oral once PRN  sodium chloride 0.9% lock flush 10 milliLiter(s) IV Push every 1 hour PRN      REVIEW OF SYSTEMS  --------------------------------------------------------------------------------  Unable to obtain ROS, see HPI    VITALS/PHYSICAL EXAM  --------------------------------------------------------------------------------  T(C): 36.9 (09-17-24 @ 05:07), Max: 36.9 (09-17-24 @ 05:07)  HR: 82 (09-17-24 @ 05:07) (75 - 82)  BP: 117/61 (09-17-24 @ 05:07) (117/61 - 127/50)  RR: 18 (09-17-24 @ 05:07) (18 - 20)  SpO2: 100% (09-17-24 @ 05:07) (98% - 100%)  Wt(kg): --    09-16-24 @ 07:01  -  09-17-24 @ 07:00  --------------------------------------------------------  IN: 0 mL / OUT: 700 mL / NET: -700 mL    Physical Exam:  Gen: resting   HEENT: Anicteric  Pulm: Fair air entry B/L  CV: S1S2+  Abd: Soft, +BS   Christian cath in place  Ext: LE trace edema B/L  Neuro: Awake      LABS/STUDIES  --------------------------------------------------------------------------------              7.8    6.61  >-----------<  399      [09-17-24 @ 07:00]              25.7     136  |  109  |  41  ----------------------------<  83      [09-17-24 @ 07:00]  4.5   |  17  |  2.43        Ca     8.2     [09-17-24 @ 07:00]      Mg     2.30     [09-17-24 @ 07:00]      Phos  3.7     [09-17-24 @ 07:00]    Creatinine Trend:  SCr 2.43 [09-17 @ 07:00]  SCr 2.54 [09-16 @ 06:55]  SCr 2.62 [09-15 @ 06:01]  SCr 3.03 [09-14 @ 06:25]  SCr 3.41 [09-13 @ 07:13]    Iron 86, TIBC --, %sat --      [09-12-24 @ 07:00]  TSH 6.55      [09-12-24 @ 07:00]    HIV Nonreact      [09-12-24 @ 07:00]    Syphilis Screen (Treponema Pallidum Ab) Negative      [09-12-24 @ 07:00]

## 2024-09-18 LAB
ANION GAP SERPL CALC-SCNC: 12 MMOL/L — SIGNIFICANT CHANGE UP (ref 7–14)
BUN SERPL-MCNC: 35 MG/DL — HIGH (ref 7–23)
CALCIUM SERPL-MCNC: 8.5 MG/DL — SIGNIFICANT CHANGE UP (ref 8.4–10.5)
CHLORIDE SERPL-SCNC: 108 MMOL/L — HIGH (ref 98–107)
CO2 SERPL-SCNC: 18 MMOL/L — LOW (ref 22–31)
CREAT SERPL-MCNC: 2.35 MG/DL — HIGH (ref 0.5–1.3)
EGFR: 23 ML/MIN/1.73M2 — LOW
GLUCOSE SERPL-MCNC: 77 MG/DL — SIGNIFICANT CHANGE UP (ref 70–99)
HCT VFR BLD CALC: 24.5 % — LOW (ref 34.5–45)
HGB BLD-MCNC: 7.7 G/DL — LOW (ref 11.5–15.5)
MAGNESIUM SERPL-MCNC: 2.3 MG/DL — SIGNIFICANT CHANGE UP (ref 1.6–2.6)
MCHC RBC-ENTMCNC: 26.8 PG — LOW (ref 27–34)
MCHC RBC-ENTMCNC: 31.4 GM/DL — LOW (ref 32–36)
MCV RBC AUTO: 85.4 FL — SIGNIFICANT CHANGE UP (ref 80–100)
NRBC # BLD: 0 /100 WBCS — SIGNIFICANT CHANGE UP (ref 0–0)
NRBC # FLD: 0 K/UL — SIGNIFICANT CHANGE UP (ref 0–0)
PHOSPHATE SERPL-MCNC: 3.5 MG/DL — SIGNIFICANT CHANGE UP (ref 2.5–4.5)
PLATELET # BLD AUTO: 464 K/UL — HIGH (ref 150–400)
POTASSIUM SERPL-MCNC: 4.6 MMOL/L — SIGNIFICANT CHANGE UP (ref 3.5–5.3)
POTASSIUM SERPL-SCNC: 4.6 MMOL/L — SIGNIFICANT CHANGE UP (ref 3.5–5.3)
RBC # BLD: 2.87 M/UL — LOW (ref 3.8–5.2)
RBC # FLD: 18.9 % — HIGH (ref 10.3–14.5)
SODIUM SERPL-SCNC: 138 MMOL/L — SIGNIFICANT CHANGE UP (ref 135–145)
VIT B1 SERPL-MCNC: 55.3 NMOL/L — LOW (ref 66.5–200)
WBC # BLD: 7.2 K/UL — SIGNIFICANT CHANGE UP (ref 3.8–10.5)
WBC # FLD AUTO: 7.2 K/UL — SIGNIFICANT CHANGE UP (ref 3.8–10.5)

## 2024-09-18 PROCEDURE — 99232 SBSQ HOSP IP/OBS MODERATE 35: CPT

## 2024-09-18 RX ADMIN — Medication 5 MILLIGRAM(S): at 05:58

## 2024-09-18 RX ADMIN — SEVELAMER CARBONATE 800 MILLIGRAM(S): 800 TABLET, FILM COATED ORAL at 09:00

## 2024-09-18 RX ADMIN — Medication 5000 UNIT(S): at 05:57

## 2024-09-18 RX ADMIN — Medication 5000 UNIT(S): at 21:15

## 2024-09-18 RX ADMIN — Medication 5000 UNIT(S): at 14:38

## 2024-09-18 RX ADMIN — CHLORHEXIDINE GLUCONATE ORAL RINSE 1 APPLICATION(S): 1.2 SOLUTION DENTAL at 06:00

## 2024-09-18 RX ADMIN — Medication 25 MICROGRAM(S): at 04:05

## 2024-09-18 RX ADMIN — MULTI VITAMIN/MINERAL SUPPLEMENT WITH ASCORBIC ACID, NIACIN, PYRIDOXINE, PANTOTHENIC ACID, FOLIC ACID, RIBOFLAVIN, THIAMIN, BIOTIN, COBALAMIN AND ZINC. 1 TABLET(S): 60; 20; 12.5; 10; 10; 1.7; 1.5; 1; .3; .006 TABLET, COATED ORAL at 12:14

## 2024-09-18 NOTE — PROGRESS NOTE ADULT - SUBJECTIVE AND OBJECTIVE BOX
[facility] Division of Kidney Diseases & Hypertension  FOLLOW UP NOTE  [phone]--------------------------------------------------------------------------------    Chief Complaint: LINA    24 hour events/subjective: Pt. was seen and evaluated at bedside earlier today. Resting comfortably. Pt. appears confused, unable to obtain ROS.    PAST HISTORY  --------------------------------------------------------------------------------  No significant changes to PMH, PSH, FHx, SHx, unless otherwise noted    ALLERGIES & MEDICATIONS  --------------------------------------------------------------------------------  Allergies    No Known Allergies    Intolerances      Standing Inpatient Medications  amLODIPine   Tablet 5 milliGRAM(s) Oral daily  chlorhexidine 2% Cloths 1 Application(s) Topical <User Schedule>  dextrose 5%. 1000 milliLiter(s) IV Continuous <Continuous>  dextrose 5%. 1000 milliLiter(s) IV Continuous <Continuous>  dextrose 50% Injectable 25 Gram(s) IV Push once  dextrose 50% Injectable 12.5 Gram(s) IV Push once  dextrose 50% Injectable 25 Gram(s) IV Push once  glucagon  Injectable 1 milliGRAM(s) IntraMuscular once  heparin   Injectable 5000 Unit(s) SubCutaneous every 8 hours  levothyroxine 25 MICROGram(s) Oral daily  Nephro-johny 1 Tablet(s) Oral daily  sevelamer carbonate 800 milliGRAM(s) Oral three times a day with meals    PRN Inpatient Medications  dextrose Oral Gel 15 Gram(s) Oral once PRN  melatonin 3 milliGRAM(s) Oral at bedtime PRN  sodium chloride 0.9% lock flush 10 milliLiter(s) IV Push every 1 hour PRN      REVIEW OF SYSTEMS  --------------------------------------------------------------------------------  Unable to obtain ROS, see HPI    VITALS/PHYSICAL EXAM  --------------------------------------------------------------------------------  T(C): 37 (09-18-24 @ 05:51), Max: 37 (09-18-24 @ 05:51)  HR: 90 (09-18-24 @ 05:51) (85 - 90)  BP: 133/67 (09-18-24 @ 05:51) (125/65 - 133/67)  RR: 18 (09-18-24 @ 05:51) (18 - 18)  SpO2: 99% (09-18-24 @ 05:51) (99% - 100%)  Wt(kg): --    09-17-24 @ 07:01  -  09-18-24 @ 07:00  --------------------------------------------------------  IN: 0 mL / OUT: 1900 mL / NET: -1900 mL    Physical Exam:  Gen: resting   HEENT: Anicteric  Pulm: Fair air entry B/L  CV: S1S2+  Abd: Soft, +BS   Christian cath in place  Ext: LE trace edema B/L  Neuro: Awake    LABS/STUDIES  --------------------------------------------------------------------------------              7.7    7.20  >-----------<  464      [09-18-24 @ 05:45]              24.5     138  |  108  |  35  ----------------------------<  77      [09-18-24 @ 05:45]  4.6   |  18  |  2.35        Ca     8.5     [09-18-24 @ 05:45]      Mg     2.30     [09-18-24 @ 05:45]      Phos  3.5     [09-18-24 @ 05:45]    Creatinine Trend:  SCr 2.35 [09-18 @ 05:45]  SCr 2.43 [09-17 @ 07:00]  SCr 2.54 [09-16 @ 06:55]  SCr 2.62 [09-15 @ 06:01]  SCr 3.03 [09-14 @ 06:25]    Iron 86, TIBC --, %sat --      [09-12-24 @ 07:00]  TSH 6.55      [09-12-24 @ 07:00]    HIV Nonreact      [09-12-24 @ 07:00]    Syphilis Screen (Treponema Pallidum Ab) Negative      [09-12-24 @ 07:00]

## 2024-09-18 NOTE — PROGRESS NOTE ADULT - ASSESSMENT
58 year old Maltese-speaking female with a history of RA on MTX, HTN, HLD initially admitted to MICU for sepsis with acute renal failure 2/2 ESBL. E. coli bacteremia and UTI. Hospital course c/b acute encephalopathy and dysphagia.

## 2024-09-18 NOTE — CHART NOTE - NSCHARTNOTESELECT_GEN_ALL_CORE
Event Note
Event Note
Follow-up/Nutrition Services
MAR Accept
Transfer Note
Sumeet Removal/Event Note

## 2024-09-18 NOTE — PROGRESS NOTE ADULT - PROBLEM SELECTOR PLAN 1
Pt. with LINA in setting of UTI/sepsis/bacteremia and recent IV contrast use. Pt. with likely ATN. On admission, Scr was elevated at 7.02. Pt. initiated on HD on 9/8/24 due to altered mental status and concern for uremia. Pt. received second HD treatment on 9/9/24. LINA resolving now. Scr improved to 2.35 today. No current plan for HD, HD catheter was removed on 9/12/24. Monitor labs and urine output. Avoid any potential nephrotoxins. Dose medications as per eGFR.

## 2024-09-18 NOTE — CHART NOTE - NSCHARTNOTEFT_GEN_A_CORE
NUTRITION FOLLOW UP NOTE    Pt seen for nutrition follow-up.    SOURCE: [] Patient [x] Medical record [x] RN/PCA [] Family/Caregiver []Patient unavailable [x]Patient inappropriate (disoriented, nonverbal, intubated/sedated) [] Other:    Medical Course: 58 year old Amharic-speaking female with a history of RA on MTX, HTN, HLD initially admitted to MICU for sepsis with acute renal failure 2/2 ESBL. E. coli bacteremia and UTI. Hospital course c/b acute encephalopathy and dysphagia, per chart.     Diet Prescription: Diet, Pureed (09-15-24 @ 08:59)    Nutrition Course:  Patient is A&O x 1-2, unable to conduct nutrition interview during visit. Per PCA by bedside, patient consumed ~75% of lunch today, requires assistance in feeding. Per RN flow sheet, noted one intake of 26-50% documented. Suspected po intake <75% during hospital stay. Per swallow eval dated 9/15, recommended puree with thin liquid diet consistency, aligns with current diet order. Patient was prior on NGT feeding in MICU, po diet ordered since 9/11 (7 days), per SLP recommendations. No recent episodes of nausea/vomiting/diarrhea/constipation reported at this time. Last BM 9/18, per RN flowsheet. Patient is on Nephro-johny for micronutrient support.     Pertinent Medications: MEDICATIONS  (STANDING):  amLODIPine   Tablet 5 milliGRAM(s) Oral daily  chlorhexidine 2% Cloths 1 Application(s) Topical <User Schedule>  dextrose 5%. 1000 milliLiter(s) (100 mL/Hr) IV Continuous <Continuous>  dextrose 5%. 1000 milliLiter(s) (50 mL/Hr) IV Continuous <Continuous>  dextrose 50% Injectable 25 Gram(s) IV Push once  dextrose 50% Injectable 12.5 Gram(s) IV Push once  dextrose 50% Injectable 25 Gram(s) IV Push once  glucagon  Injectable 1 milliGRAM(s) IntraMuscular once  heparin   Injectable 5000 Unit(s) SubCutaneous every 8 hours  levothyroxine 25 MICROGram(s) Oral daily  Nephro-johny 1 Tablet(s) Oral daily    MEDICATIONS  (PRN):  dextrose Oral Gel 15 Gram(s) Oral once PRN Blood Glucose LESS THAN 70 milliGRAM(s)/deciliter  melatonin 3 milliGRAM(s) Oral at bedtime PRN Insomnia  sodium chloride 0.9% lock flush 10 milliLiter(s) IV Push every 1 hour PRN Pre/post blood products, medications, blood draw, and to maintain line patency    Pertinent Labs: 09-18 Na138 mmol/L Glu 77 mg/dL K+ 4.6 mmol/L Cr  2.35 mg/dL[H] BUN 35 mg/dL[H] 09-18 Phos 3.5 mg/dL 09-12 Alb 3.1 g/dL[L]  A1C with Estimated Average Glucose Result: 6.3 % (09-11-24 @ 06:40)    Height (cm): 157.5 (06 Sep 2024 02:40)  Weight (kg): 123.2lbs/56kg (9/18, RD obtained weight via bedscale during visit), 60.5kg (9/10), 63.1 (06 Sep 2024 02:40)  BMI (kg/m2): 22.5kg/m2(9/18)  Weight assessment: -4.5kg/-7.4%BW x1 week, -7.1kg/-11%BW x 12days, possibly 2/2 in combination of fluid loss and decreased po intake. Patient s/p HD on 9/8, 9/9.     Physical Assessment, per flowsheets:  Edema: As per RN flow sheet, no edema noted at this time. Noted patient has 1+generalized edema documented on RN flow sheet  Pressure Injury: As per RN flow sheet, no pressure injury noted at this time.      Estimated Needs:   [X] No change since previous assessment, based on IBW- 110 lb/49.8 kg  Estimated energy needs: 30-35kcal/kg/day= 1494-1743kcal/day  Estimated protein needs: 1.4- 1.6gm/kg/day= 69.72-79.68gm/day    Previous Nutrition Diagnosis:   [x ] Excessive Energy Intake   Nutrition Diagnosis is [ x] resolved   [x ] Altered nutrition labs  Nutrition Diagnosis is [ x] on-going    New Nutrition Diagnosis: [ x]Moderate malnutrition in the context of acute illness  related to : physiological cause  as evidenced by: consumes <75% of EER for >7days, weight loss of >2% x 1 week.     Education:  [x ] Not applicable     Interventions:   1) Continue with liberalized regular pureed diet, to optimize po intake.    2) Recommend adding Ensure Plus HP 8oz 1x/day (350kcal, 20gm pro) for nutrient support.   3) Document % po intake consistently on RN Flowsheet.   4) Obtain weekly weight, to monitor trend.   5) Encourage PO intake and honor food preferences as able.     Monitor & Evaluate:  PO intake, tolerance to diet/supplement, nutrition related lab values, weight trends, BMs/GI distress, hydration status, skin integrity.    Bianca Harrington RD, CDN  Pager #[phone], also available on [employer] Teams.

## 2024-09-18 NOTE — PROGRESS NOTE ADULT - PROBLEM SELECTOR PLAN 2
Pt. with hypernatremia in setting of decreased free water intake. SNa stable at 138 today. Monitor SNa    If you have any questions, please feel free to contact me  Chris Godinez  Nephrology  [phone] / [employer] Teams(Preferred)  (After 4 pm or on weekends please page the on-call fellow)

## 2024-09-18 NOTE — PROGRESS NOTE ADULT - SUBJECTIVE AND OBJECTIVE BOX
PROGRESS NOTE:     Patient is a 58y old  Female who presents with a chief complaint of urgent HD (18 Sep 2024 10:05)      SUBJECTIVE / OVERNIGHT EVENTS: No acute events.     ADDITIONAL REVIEW OF SYSTEMS:    MEDICATIONS  (STANDING):  amLODIPine   Tablet 5 milliGRAM(s) Oral daily  chlorhexidine 2% Cloths 1 Application(s) Topical <User Schedule>  dextrose 5%. 1000 milliLiter(s) (100 mL/Hr) IV Continuous <Continuous>  dextrose 5%. 1000 milliLiter(s) (50 mL/Hr) IV Continuous <Continuous>  dextrose 50% Injectable 25 Gram(s) IV Push once  dextrose 50% Injectable 12.5 Gram(s) IV Push once  dextrose 50% Injectable 25 Gram(s) IV Push once  glucagon  Injectable 1 milliGRAM(s) IntraMuscular once  heparin   Injectable 5000 Unit(s) SubCutaneous every 8 hours  levothyroxine 25 MICROGram(s) Oral daily  Nephro-johny 1 Tablet(s) Oral daily    MEDICATIONS  (PRN):  dextrose Oral Gel 15 Gram(s) Oral once PRN Blood Glucose LESS THAN 70 milliGRAM(s)/deciliter  melatonin 3 milliGRAM(s) Oral at bedtime PRN Insomnia  sodium chloride 0.9% lock flush 10 milliLiter(s) IV Push every 1 hour PRN Pre/post blood products, medications, blood draw, and to maintain line patency      CAPILLARY BLOOD GLUCOSE        I&O's Summary    17 Sep 2024 07:01  -  18 Sep 2024 07:00  --------------------------------------------------------  IN: 0 mL / OUT: 1900 mL / NET: -1900 mL    18 Sep 2024 07:01  -  18 Sep 2024 14:02  --------------------------------------------------------  IN: 0 mL / OUT: 400 mL / NET: -400 mL        PHYSICAL EXAM:  Vital Signs Last 24 Hrs  T(C): 37 (18 Sep 2024 05:51), Max: 37 (18 Sep 2024 05:51)  T(F): 98.6 (18 Sep 2024 05:51), Max: 98.6 (18 Sep 2024 05:51)  HR: 90 (18 Sep 2024 05:51) (90 - 90)  BP: 133/67 (18 Sep 2024 05:51) (130/65 - 133/67)  BP(mean): --  RR: 18 (18 Sep 2024 05:51) (18 - 18)  SpO2: 99% (18 Sep 2024 05:51) (99% - 100%)    Parameters below as of 18 Sep 2024 05:51  Patient On (Oxygen Delivery Method): room air      CONSTITUTIONAL: NAD  EYES: Conjunctiva and sclera clear  ENMT: Dry oral mucosa, poor oral hygiene suspect due to betel nut chewing  RESPIRATORY: Normal respiratory effort; lungs are clear to auscultation bilaterally  CARDIOVASCULAR: Regular rate and rhythm, normal S1 and S2, no murmur/rub/gallop  ABDOMEN: Soft, nontender to palpation, normoactive bowel sounds  : Christian  PSYCH: A+O to person  NEUROLOGY: Moves all extremities     LABS:                        7.7    7.20  )-----------( 464      ( 18 Sep 2024 05:45 )             24.5     09-18    138  |  108[H]  |  35[H]  ----------------------------<  77  4.6   |  18[L]  |  2.35[H]    Ca    8.5      18 Sep 2024 05:45  Phos  3.5     09-18  Mg     2.30     09-18            Urinalysis Basic - ( 18 Sep 2024 05:45 )    Color: x / Appearance: x / SG: x / pH: x  Gluc: 77 mg/dL / Ketone: x  / Bili: x / Urobili: x   Blood: x / Protein: x / Nitrite: x   Leuk Esterase: x / RBC: x / WBC x   Sq Epi: x / Non Sq Epi: x / Bacteria: x          RADIOLOGY & ADDITIONAL TESTS:  Results Reviewed:   Imaging Personally Reviewed:  Electrocardiogram Personally Reviewed:    COORDINATION OF CARE:  Care Discussed with Consultants/Other Providers [Y/N]:  Prior or Outpatient Records Reviewed [Y/N]:

## 2024-09-19 LAB
ANION GAP SERPL CALC-SCNC: 14 MMOL/L — SIGNIFICANT CHANGE UP (ref 7–14)
BUN SERPL-MCNC: 35 MG/DL — HIGH (ref 7–23)
CALCIUM SERPL-MCNC: 8.6 MG/DL — SIGNIFICANT CHANGE UP (ref 8.4–10.5)
CHLORIDE SERPL-SCNC: 105 MMOL/L — SIGNIFICANT CHANGE UP (ref 98–107)
CO2 SERPL-SCNC: 18 MMOL/L — LOW (ref 22–31)
CREAT SERPL-MCNC: 2.39 MG/DL — HIGH (ref 0.5–1.3)
EGFR: 23 ML/MIN/1.73M2 — LOW
GLUCOSE SERPL-MCNC: 81 MG/DL — SIGNIFICANT CHANGE UP (ref 70–99)
HCT VFR BLD CALC: 25.8 % — LOW (ref 34.5–45)
HGB BLD-MCNC: 8 G/DL — LOW (ref 11.5–15.5)
MAGNESIUM SERPL-MCNC: 2.3 MG/DL — SIGNIFICANT CHANGE UP (ref 1.6–2.6)
MCHC RBC-ENTMCNC: 26.7 PG — LOW (ref 27–34)
MCHC RBC-ENTMCNC: 31 GM/DL — LOW (ref 32–36)
MCV RBC AUTO: 86 FL — SIGNIFICANT CHANGE UP (ref 80–100)
NRBC # BLD: 0 /100 WBCS — SIGNIFICANT CHANGE UP (ref 0–0)
NRBC # FLD: 0 K/UL — SIGNIFICANT CHANGE UP (ref 0–0)
PHOSPHATE SERPL-MCNC: 3.6 MG/DL — SIGNIFICANT CHANGE UP (ref 2.5–4.5)
PLATELET # BLD AUTO: 441 K/UL — HIGH (ref 150–400)
POTASSIUM SERPL-MCNC: 4.8 MMOL/L — SIGNIFICANT CHANGE UP (ref 3.5–5.3)
POTASSIUM SERPL-SCNC: 4.8 MMOL/L — SIGNIFICANT CHANGE UP (ref 3.5–5.3)
RBC # BLD: 3 M/UL — LOW (ref 3.8–5.2)
RBC # FLD: 18.5 % — HIGH (ref 10.3–14.5)
SODIUM SERPL-SCNC: 137 MMOL/L — SIGNIFICANT CHANGE UP (ref 135–145)
WBC # BLD: 9.42 K/UL — SIGNIFICANT CHANGE UP (ref 3.8–10.5)
WBC # FLD AUTO: 9.42 K/UL — SIGNIFICANT CHANGE UP (ref 3.8–10.5)

## 2024-09-19 PROCEDURE — 99232 SBSQ HOSP IP/OBS MODERATE 35: CPT

## 2024-09-19 RX ORDER — THIAMINE HYDROCHLORIDE 100 MG/ML
100 INJECTION, SOLUTION INTRAMUSCULAR; INTRAVENOUS DAILY
Refills: 0 | Status: DISCONTINUED | OUTPATIENT
Start: 2024-09-19 | End: 2024-09-22

## 2024-09-19 RX ADMIN — Medication 25 MICROGRAM(S): at 06:36

## 2024-09-19 RX ADMIN — Medication 5000 UNIT(S): at 21:17

## 2024-09-19 RX ADMIN — MULTI VITAMIN/MINERAL SUPPLEMENT WITH ASCORBIC ACID, NIACIN, PYRIDOXINE, PANTOTHENIC ACID, FOLIC ACID, RIBOFLAVIN, THIAMIN, BIOTIN, COBALAMIN AND ZINC. 1 TABLET(S): 60; 20; 12.5; 10; 10; 1.7; 1.5; 1; .3; .006 TABLET, COATED ORAL at 14:25

## 2024-09-19 RX ADMIN — Medication 5000 UNIT(S): at 14:24

## 2024-09-19 RX ADMIN — Medication 5 MILLIGRAM(S): at 06:36

## 2024-09-19 RX ADMIN — Medication 5000 UNIT(S): at 06:36

## 2024-09-19 RX ADMIN — CHLORHEXIDINE GLUCONATE ORAL RINSE 1 APPLICATION(S): 1.2 SOLUTION DENTAL at 06:35

## 2024-09-19 RX ADMIN — THIAMINE HYDROCHLORIDE 100 MILLIGRAM(S): 100 INJECTION, SOLUTION INTRAMUSCULAR; INTRAVENOUS at 14:24

## 2024-09-19 NOTE — PROVIDER CONTACT NOTE (OTHER) - SITUATION
aPTT result came back 30.5 below the therapeutic parameter.
Patient is on heaprin gtt, but no q6hr aPTT labs draw for nomogram
bladder scan 313
Bladder scan 642,

## 2024-09-19 NOTE — PROGRESS NOTE ADULT - PROBLEM SELECTOR PLAN 1
Pt. with LINA in setting of UTI/sepsis/bacteremia and recent IV contrast use. Pt. with likely ATN. On admission, Scr was elevated at 7.02. Pt. initiated on HD on 9/8/24 due to altered mental status and concern for uremia. Pt. received second HD treatment on 9/9/24. LINA resolving now. Scr elevated/stable at 2.39 today. No current plan for HD, HD catheter was removed on 9/12/24. Monitor labs and urine output. Avoid any potential nephrotoxins. Dose medications as per eGFR.

## 2024-09-19 NOTE — PROVIDER CONTACT NOTE (OTHER) - ACTION/TREATMENT ORDERED:
SHERI Baum aware. STAT aPTT ordered and sent. Care continues
ACP notified.
BJ Dan was notified, said wait till 8am for patient to void
SHERI Baum aware. As per SHERI Baum, no need to administer the bolus because the heparin going to stop at 23:00. Care continues

## 2024-09-19 NOTE — PROVIDER CONTACT NOTE (OTHER) - ASSESSMENT
Patient A&Ox4,no s/s of bleeding noted., no aPTT lab result as per nomogram.
Patient heparin gtt supposed to come off at 23:00 as per ordered. Heparin scan, nomogram states to infuse at 19ml/hr with a 7500 unit of bolus. No PRN order for heparin ordered
patient denies chest pain, shortness of breath, headache, dizziness, blurry vision; no change in mentation.
Bladder scan 643

## 2024-09-19 NOTE — PROVIDER CONTACT NOTE (OTHER) - BACKGROUND
bladder scan 313
Patient admitted for hematuria and management of PE
Patient admitted for hematuria and management of PE

## 2024-09-19 NOTE — PROGRESS NOTE ADULT - ASSESSMENT
58 year old Welsh-speaking female with a history of RA on MTX, HTN, HLD initially admitted to MICU for sepsis with acute renal failure 2/2 ESBL. E. coli bacteremia and UTI. Hospital course c/b acute encephalopathy and dysphagia.

## 2024-09-19 NOTE — PROGRESS NOTE ADULT - PROBLEM SELECTOR PLAN 2
Pt. with hypernatremia in setting of decreased free water intake. SNa stable at 137 today. Monitor SNa    If you have any questions, please feel free to contact me  Chris Godinez  Nephrology  [phone] / [employer] Teams(Preferred)  (After 4 pm or on weekends please page the on-call fellow)

## 2024-09-19 NOTE — PROGRESS NOTE ADULT - SUBJECTIVE AND OBJECTIVE BOX
Dr. Mary Kee  Pager [phone]    PROGRESS NOTE:     Patient is a 58y old  Female who presents with a chief complaint of urgent HD (19 Sep 2024 10:28)      SUBJECTIVE / OVERNIGHT EVENTS: still restless and confused  ADDITIONAL REVIEW OF SYSTEMS: afebrile , mumbling     MEDICATIONS  (STANDING):  amLODIPine   Tablet 5 milliGRAM(s) Oral daily  chlorhexidine 2% Cloths 1 Application(s) Topical <User Schedule>  dextrose 5%. 1000 milliLiter(s) (50 mL/Hr) IV Continuous <Continuous>  dextrose 5%. 1000 milliLiter(s) (100 mL/Hr) IV Continuous <Continuous>  dextrose 50% Injectable 25 Gram(s) IV Push once  dextrose 50% Injectable 12.5 Gram(s) IV Push once  dextrose 50% Injectable 25 Gram(s) IV Push once  glucagon  Injectable 1 milliGRAM(s) IntraMuscular once  heparin   Injectable 5000 Unit(s) SubCutaneous every 8 hours  levothyroxine 25 MICROGram(s) Oral daily  Nephro-johny 1 Tablet(s) Oral daily  thiamine 100 milliGRAM(s) Oral daily    MEDICATIONS  (PRN):  dextrose Oral Gel 15 Gram(s) Oral once PRN Blood Glucose LESS THAN 70 milliGRAM(s)/deciliter  melatonin 3 milliGRAM(s) Oral at bedtime PRN Insomnia  sodium chloride 0.9% lock flush 10 milliLiter(s) IV Push every 1 hour PRN Pre/post blood products, medications, blood draw, and to maintain line patency      CAPILLARY BLOOD GLUCOSE        I&O's Summary    18 Sep 2024 07:01  -  19 Sep 2024 07:00  --------------------------------------------------------  IN: 0 mL / OUT: 400 mL / NET: -400 mL        PHYSICAL EXAM:  Vital Signs Last 24 Hrs  T(C): 36.6 (19 Sep 2024 06:30), Max: 36.6 (19 Sep 2024 06:30)  T(F): 97.9 (19 Sep 2024 06:30), Max: 97.9 (19 Sep 2024 06:30)  HR: 78 (19 Sep 2024 11:56) (78 - 85)  BP: 118/65 (19 Sep 2024 11:56) (118/65 - 129/71)  BP(mean): --  RR: 18 (19 Sep 2024 11:56) (18 - 18)  SpO2: 100% (19 Sep 2024 11:56) (99% - 100%)    Parameters below as of 19 Sep 2024 06:30  Patient On (Oxygen Delivery Method): room air      CONSTITUTIONAL: NAD  EYES: Conjunctiva and sclera clear  ENMT: Dry oral mucosa, poor oral hygiene suspect due to betel nut chewing  RESPIRATORY: Normal respiratory effort; lungs are clear to auscultation bilaterally  CARDIOVASCULAR: Regular rate and rhythm, normal S1 and S2, no murmur/rub/gallop  ABDOMEN: Soft, nontender to palpation, normoactive bowel sounds  : Christian  PSYCH: A+O to person, confused  NEUROLOGY: Moves all extremities     LABS:                        8.0    9.42  )-----------( 441      ( 19 Sep 2024 06:37 )             25.8     09-19    137  |  105  |  35[H]  ----------------------------<  81  4.8   |  18[L]  |  2.39[H]    Ca    8.6      19 Sep 2024 06:37  Phos  3.6     09-19  Mg     2.30     09-19            Urinalysis Basic - ( 19 Sep 2024 06:37 )    Color: x / Appearance: x / SG: x / pH: x  Gluc: 81 mg/dL / Ketone: x  / Bili: x / Urobili: x   Blood: x / Protein: x / Nitrite: x   Leuk Esterase: x / RBC: x / WBC x   Sq Epi: x / Non Sq Epi: x / Bacteria: x          RADIOLOGY & ADDITIONAL TESTS:  Results Reviewed:   Imaging Personally Reviewed:  Electrocardiogram Personally Reviewed:    COORDINATION OF CARE:  Care Discussed with Consultants/Other Providers [Y/N]:  Prior or Outpatient Records Reviewed [Y/N]:

## 2024-09-19 NOTE — PROGRESS NOTE ADULT - SUBJECTIVE AND OBJECTIVE BOX
[facility] Division of Kidney Diseases & Hypertension  FOLLOW UP NOTE  [phone]--------------------------------------------------------------------------------    Chief Complaint: LINA    24 hour events/subjective: Pt. was seen and evaluated at bedside earlier today. Resting comfortably. Pt. appears confused, unable to obtain ROS.      PAST HISTORY  --------------------------------------------------------------------------------  No significant changes to PMH, PSH, FHx, SHx, unless otherwise noted    ALLERGIES & MEDICATIONS  --------------------------------------------------------------------------------  Allergies    No Known Allergies    Intolerances      Standing Inpatient Medications  amLODIPine   Tablet 5 milliGRAM(s) Oral daily  chlorhexidine 2% Cloths 1 Application(s) Topical <User Schedule>  dextrose 5%. 1000 milliLiter(s) IV Continuous <Continuous>  dextrose 5%. 1000 milliLiter(s) IV Continuous <Continuous>  dextrose 50% Injectable 25 Gram(s) IV Push once  dextrose 50% Injectable 12.5 Gram(s) IV Push once  dextrose 50% Injectable 25 Gram(s) IV Push once  glucagon  Injectable 1 milliGRAM(s) IntraMuscular once  heparin   Injectable 5000 Unit(s) SubCutaneous every 8 hours  levothyroxine 25 MICROGram(s) Oral daily  Nephro-johny 1 Tablet(s) Oral daily  thiamine 100 milliGRAM(s) Oral daily    PRN Inpatient Medications  dextrose Oral Gel 15 Gram(s) Oral once PRN  melatonin 3 milliGRAM(s) Oral at bedtime PRN  sodium chloride 0.9% lock flush 10 milliLiter(s) IV Push every 1 hour PRN      REVIEW OF SYSTEMS  --------------------------------------------------------------------------------  Unable to obtain ROS, see HPI    VITALS/PHYSICAL EXAM  --------------------------------------------------------------------------------  T(C): 36.6 (09-19-24 @ 06:30), Max: 36.6 (09-19-24 @ 06:30)  HR: 85 (09-19-24 @ 06:30) (85 - 106)  BP: 129/71 (09-19-24 @ 06:30) (129/71 - 133/66)  RR: 18 (09-19-24 @ 06:30) (18 - 18)  SpO2: 99% (09-19-24 @ 06:30) (99% - 100%)  Wt(kg): --    09-18-24 @ 07:01  -  09-19-24 @ 07:00  --------------------------------------------------------  IN: 0 mL / OUT: 400 mL / NET: -400 mL    Physical Exam:  Gen: resting   HEENT: Anicteric  Pulm: Fair air entry B/L  CV: S1S2+  Abd: Soft, +BS   Christian cath in place  Ext: LE trace edema B/L  Neuro: Awake but appears confused    LABS/STUDIES  --------------------------------------------------------------------------------              8.0    9.42  >-----------<  441      [09-19-24 @ 06:37]              25.8     137  |  105  |  35  ----------------------------<  81      [09-19-24 @ 06:37]  4.8   |  18  |  2.39        Ca     8.6     [09-19-24 @ 06:37]      Mg     2.30     [09-19-24 @ 06:37]      Phos  3.6     [09-19-24 @ 06:37]    Creatinine Trend:  SCr 2.39 [09-19 @ 06:37]  SCr 2.35 [09-18 @ 05:45]  SCr 2.43 [09-17 @ 07:00]  SCr 2.54 [09-16 @ 06:55]  SCr 2.62 [09-15 @ 06:01]

## 2024-09-19 NOTE — PROVIDER CONTACT NOTE (OTHER) - RECOMMENDATIONS
ACP notified.
SHERI Baum aware. STAT aPTT ordered and sent. Care continues
BJ Dan was notified, said wait till 8am for patient to void
SHERI Baum aware. As per SHERI Baum, no need to administer the bolus because the heparin going to stop at 23:00. Care continues

## 2024-09-20 LAB
ANION GAP SERPL CALC-SCNC: 15 MMOL/L — HIGH (ref 7–14)
BUN SERPL-MCNC: 36 MG/DL — HIGH (ref 7–23)
CALCIUM SERPL-MCNC: 9.1 MG/DL — SIGNIFICANT CHANGE UP (ref 8.4–10.5)
CHLORIDE SERPL-SCNC: 106 MMOL/L — SIGNIFICANT CHANGE UP (ref 98–107)
CO2 SERPL-SCNC: 16 MMOL/L — LOW (ref 22–31)
CREAT SERPL-MCNC: 2.42 MG/DL — HIGH (ref 0.5–1.3)
EGFR: 22 ML/MIN/1.73M2 — LOW
GLUCOSE SERPL-MCNC: 93 MG/DL — SIGNIFICANT CHANGE UP (ref 70–99)
HCT VFR BLD CALC: 25.2 % — LOW (ref 34.5–45)
HGB BLD-MCNC: 7.5 G/DL — LOW (ref 11.5–15.5)
MAGNESIUM SERPL-MCNC: 2.5 MG/DL — SIGNIFICANT CHANGE UP (ref 1.6–2.6)
MCHC RBC-ENTMCNC: 26.3 PG — LOW (ref 27–34)
MCHC RBC-ENTMCNC: 29.8 GM/DL — LOW (ref 32–36)
MCV RBC AUTO: 88.4 FL — SIGNIFICANT CHANGE UP (ref 80–100)
NRBC # BLD: 0 /100 WBCS — SIGNIFICANT CHANGE UP (ref 0–0)
NRBC # FLD: 0 K/UL — SIGNIFICANT CHANGE UP (ref 0–0)
PHOSPHATE SERPL-MCNC: 4.5 MG/DL — SIGNIFICANT CHANGE UP (ref 2.5–4.5)
PLATELET # BLD AUTO: 428 K/UL — HIGH (ref 150–400)
POTASSIUM SERPL-MCNC: 4.8 MMOL/L — SIGNIFICANT CHANGE UP (ref 3.5–5.3)
POTASSIUM SERPL-SCNC: 4.8 MMOL/L — SIGNIFICANT CHANGE UP (ref 3.5–5.3)
RBC # BLD: 2.85 M/UL — LOW (ref 3.8–5.2)
RBC # FLD: 18.7 % — HIGH (ref 10.3–14.5)
SODIUM SERPL-SCNC: 137 MMOL/L — SIGNIFICANT CHANGE UP (ref 135–145)
WBC # BLD: 9.37 K/UL — SIGNIFICANT CHANGE UP (ref 3.8–10.5)
WBC # FLD AUTO: 9.37 K/UL — SIGNIFICANT CHANGE UP (ref 3.8–10.5)

## 2024-09-20 PROCEDURE — 99232 SBSQ HOSP IP/OBS MODERATE 35: CPT

## 2024-09-20 RX ORDER — ACETAMINOPHEN 325 MG
1000 TABLET ORAL ONCE
Refills: 0 | Status: COMPLETED | OUTPATIENT
Start: 2024-09-20 | End: 2024-09-20

## 2024-09-20 RX ORDER — ACETAMINOPHEN 325 MG
650 TABLET ORAL ONCE
Refills: 0 | Status: COMPLETED | OUTPATIENT
Start: 2024-09-20 | End: 2024-09-20

## 2024-09-20 RX ADMIN — CHLORHEXIDINE GLUCONATE ORAL RINSE 1 APPLICATION(S): 1.2 SOLUTION DENTAL at 05:25

## 2024-09-20 RX ADMIN — MULTI VITAMIN/MINERAL SUPPLEMENT WITH ASCORBIC ACID, NIACIN, PYRIDOXINE, PANTOTHENIC ACID, FOLIC ACID, RIBOFLAVIN, THIAMIN, BIOTIN, COBALAMIN AND ZINC. 1 TABLET(S): 60; 20; 12.5; 10; 10; 1.7; 1.5; 1; .3; .006 TABLET, COATED ORAL at 14:20

## 2024-09-20 RX ADMIN — Medication 5000 UNIT(S): at 14:20

## 2024-09-20 RX ADMIN — Medication 3 MILLIGRAM(S): at 21:15

## 2024-09-20 RX ADMIN — Medication 5000 UNIT(S): at 05:25

## 2024-09-20 RX ADMIN — THIAMINE HYDROCHLORIDE 100 MILLIGRAM(S): 100 INJECTION, SOLUTION INTRAMUSCULAR; INTRAVENOUS at 14:20

## 2024-09-20 RX ADMIN — Medication 5000 UNIT(S): at 23:23

## 2024-09-20 RX ADMIN — Medication 5 MILLIGRAM(S): at 05:26

## 2024-09-20 RX ADMIN — Medication 25 MICROGRAM(S): at 04:13

## 2024-09-20 NOTE — PROGRESS NOTE ADULT - ASSESSMENT
58 year old Portuguese-speaking female with a history of RA on MTX, HTN, HLD initially admitted to MICU for sepsis with acute renal failure 2/2 ESBL. E. coli bacteremia and UTI. Hospital course c/b acute encephalopathy and dysphagia.

## 2024-09-20 NOTE — PROGRESS NOTE ADULT - PROBLEM SELECTOR PLAN 2
Pt. with hypernatremia in setting of decreased free water intake. SNa stable at 137 today. Monitor SNa    If you have any questions, please feel free to contact me  Chris Godinez  Nephrology  [phone] / [employer] Teams(Preferred)  (After 4 pm or on weekends please page the on-call fellow)

## 2024-09-20 NOTE — PROGRESS NOTE ADULT - SUBJECTIVE AND OBJECTIVE BOX
PROGRESS NOTE:     Patient is a 58y old  Female who presents with a chief complaint of urgent HD (20 Sep 2024 10:47)      SUBJECTIVE / OVERNIGHT EVENTS: No acute events.     ADDITIONAL REVIEW OF SYSTEMS:    MEDICATIONS  (STANDING):  amLODIPine   Tablet 5 milliGRAM(s) Oral daily  chlorhexidine 2% Cloths 1 Application(s) Topical <User Schedule>  dextrose 5%. 1000 milliLiter(s) (100 mL/Hr) IV Continuous <Continuous>  dextrose 5%. 1000 milliLiter(s) (50 mL/Hr) IV Continuous <Continuous>  dextrose 50% Injectable 25 Gram(s) IV Push once  dextrose 50% Injectable 12.5 Gram(s) IV Push once  dextrose 50% Injectable 25 Gram(s) IV Push once  glucagon  Injectable 1 milliGRAM(s) IntraMuscular once  heparin   Injectable 5000 Unit(s) SubCutaneous every 8 hours  levothyroxine 25 MICROGram(s) Oral daily  Nephro-johny 1 Tablet(s) Oral daily  thiamine 100 milliGRAM(s) Oral daily    MEDICATIONS  (PRN):  dextrose Oral Gel 15 Gram(s) Oral once PRN Blood Glucose LESS THAN 70 milliGRAM(s)/deciliter  melatonin 3 milliGRAM(s) Oral at bedtime PRN Insomnia  sodium chloride 0.9% lock flush 10 milliLiter(s) IV Push every 1 hour PRN Pre/post blood products, medications, blood draw, and to maintain line patency      CAPILLARY BLOOD GLUCOSE        I&O's Summary    19 Sep 2024 07:01  -  20 Sep 2024 07:00  --------------------------------------------------------  IN: 0 mL / OUT: 1925 mL / NET: -1925 mL    20 Sep 2024 07:01  -  20 Sep 2024 13:53  --------------------------------------------------------  IN: 210 mL / OUT: 400 mL / NET: -190 mL        PHYSICAL EXAM:  Vital Signs Last 24 Hrs  T(C): 36.7 (20 Sep 2024 11:14), Max: 36.7 (19 Sep 2024 20:23)  T(F): 98.1 (20 Sep 2024 11:14), Max: 98.1 (20 Sep 2024 11:14)  HR: 91 (20 Sep 2024 11:14) (82 - 91)  BP: 101/61 (20 Sep 2024 11:14) (101/61 - 113/67)  BP(mean): --  RR: 18 (20 Sep 2024 11:14) (17 - 18)  SpO2: 99% (20 Sep 2024 11:14) (97% - 99%)    Parameters below as of 20 Sep 2024 11:14  Patient On (Oxygen Delivery Method): room air      CONSTITUTIONAL: NAD  EYES: Conjunctiva and sclera clear  ENMT: Dry oral mucosa, poor oral hygiene suspect due to betel nut chewing  RESPIRATORY: Normal respiratory effort; lungs are clear to auscultation bilaterally  CARDIOVASCULAR: Regular rate and rhythm, normal S1 and S2, no murmur/rub/gallop  ABDOMEN: Soft, nontender to palpation, normoactive bowel sounds  : Christian  PSYCH: A+O to person  NEUROLOGY: Moves all extremities     LABS:                        7.5    9.37  )-----------( 428      ( 20 Sep 2024 07:32 )             25.2     09-20    137  |  106  |  36[H]  ----------------------------<  93  4.8   |  16[L]  |  2.42[H]    Ca    9.1      20 Sep 2024 07:32  Phos  4.5     09-20  Mg     2.50     09-20            Urinalysis Basic - ( 20 Sep 2024 07:32 )    Color: x / Appearance: x / SG: x / pH: x  Gluc: 93 mg/dL / Ketone: x  / Bili: x / Urobili: x   Blood: x / Protein: x / Nitrite: x   Leuk Esterase: x / RBC: x / WBC x   Sq Epi: x / Non Sq Epi: x / Bacteria: x          RADIOLOGY & ADDITIONAL TESTS:  Results Reviewed:   Imaging Personally Reviewed:  Electrocardiogram Personally Reviewed:    COORDINATION OF CARE:  Care Discussed with Consultants/Other Providers [Y/N]:  Prior or Outpatient Records Reviewed [Y/N]:

## 2024-09-20 NOTE — PROGRESS NOTE ADULT - PROBLEM SELECTOR PLAN 1
Pt. with LINA in setting of UTI/sepsis/bacteremia and recent IV contrast use. Pt. with likely ATN. On admission, Scr was elevated at 7.02. Pt. initiated on HD on 9/8/24 due to altered mental status and concern for uremia. Pt. received second HD treatment on 9/9/24. LINA resolving now. Scr elevated/stable at 2.42 today. Pt. is non-oliguric, documented urine output is ~1.9 L over the past 24 hours.  No current plan for HD, HD catheter was removed on 9/12/24. Monitor labs and urine output. Avoid any potential nephrotoxins. Dose medications as per eGFR.

## 2024-09-20 NOTE — PROGRESS NOTE ADULT - SUBJECTIVE AND OBJECTIVE BOX
[facility] Division of Kidney Diseases & Hypertension  FOLLOW UP NOTE  [phone]--------------------------------------------------------------------------------    Chief Complaint: LINA    24 hour events/subjective: Pt. was seen and evaluated at bedside earlier today. Resting comfortably. Pt. appears confused, unable to obtain ROS.    PAST HISTORY  --------------------------------------------------------------------------------  No significant changes to PMH, PSH, FHx, SHx, unless otherwise noted    ALLERGIES & MEDICATIONS  --------------------------------------------------------------------------------  Allergies    No Known Allergies    Intolerances      Standing Inpatient Medications  amLODIPine   Tablet 5 milliGRAM(s) Oral daily  chlorhexidine 2% Cloths 1 Application(s) Topical <User Schedule>  dextrose 5%. 1000 milliLiter(s) IV Continuous <Continuous>  dextrose 5%. 1000 milliLiter(s) IV Continuous <Continuous>  dextrose 50% Injectable 25 Gram(s) IV Push once  dextrose 50% Injectable 12.5 Gram(s) IV Push once  dextrose 50% Injectable 25 Gram(s) IV Push once  glucagon  Injectable 1 milliGRAM(s) IntraMuscular once  heparin   Injectable 5000 Unit(s) SubCutaneous every 8 hours  levothyroxine 25 MICROGram(s) Oral daily  Nephro-johny 1 Tablet(s) Oral daily  thiamine 100 milliGRAM(s) Oral daily    PRN Inpatient Medications  dextrose Oral Gel 15 Gram(s) Oral once PRN  melatonin 3 milliGRAM(s) Oral at bedtime PRN  sodium chloride 0.9% lock flush 10 milliLiter(s) IV Push every 1 hour PRN      REVIEW OF SYSTEMS  --------------------------------------------------------------------------------  Unable to obtain ROS, see HPI    VITALS/PHYSICAL EXAM  --------------------------------------------------------------------------------  T(C): 36.7 (09-20-24 @ 05:25), Max: 36.7 (09-19-24 @ 20:23)  HR: 89 (09-20-24 @ 05:25) (78 - 89)  BP: 110/59 (09-20-24 @ 05:25) (110/59 - 118/65)  RR: 17 (09-20-24 @ 05:25) (17 - 18)  SpO2: 97% (09-20-24 @ 05:25) (97% - 100%)  Wt(kg): --    09-19-24 @ 07:01  -  09-20-24 @ 07:00  --------------------------------------------------------  IN: 0 mL / OUT: 1925 mL / NET: -1925 mL    09-20-24 @ 07:01  -  09-20-24 @ 10:47  --------------------------------------------------------  IN: 210 mL / OUT: 0 mL / NET: 210 mL    Physical Exam:  Gen: resting   HEENT: Anicteric  Pulm: Fair air entry B/L  CV: S1S2+  Abd: Soft, +BS   Christian cath in place  Ext: No LE edema BL  Neuro: Awake but appears confused    LABS/STUDIES  --------------------------------------------------------------------------------              7.5    9.37  >-----------<  428      [09-20-24 @ 07:32]              25.2     137  |  106  |  36  ----------------------------<  93      [09-20-24 @ 07:32]  4.8   |  16  |  2.42        Ca     9.1     [09-20-24 @ 07:32]      Mg     2.50     [09-20-24 @ 07:32]      Phos  4.5     [09-20-24 @ 07:32]    Creatinine Trend:  SCr 2.42 [09-20 @ 07:32]  SCr 2.39 [09-19 @ 06:37]  SCr 2.35 [09-18 @ 05:45]  SCr 2.43 [09-17 @ 07:00]  SCr 2.54 [09-16 @ 06:55]

## 2024-09-21 LAB
ANION GAP SERPL CALC-SCNC: 13 MMOL/L — SIGNIFICANT CHANGE UP (ref 7–14)
BUN SERPL-MCNC: 32 MG/DL — HIGH (ref 7–23)
CALCIUM SERPL-MCNC: 9.3 MG/DL — SIGNIFICANT CHANGE UP (ref 8.4–10.5)
CHLORIDE SERPL-SCNC: 107 MMOL/L — SIGNIFICANT CHANGE UP (ref 98–107)
CO2 SERPL-SCNC: 16 MMOL/L — LOW (ref 22–31)
CREAT SERPL-MCNC: 2.24 MG/DL — HIGH (ref 0.5–1.3)
EGFR: 24 ML/MIN/1.73M2 — LOW
GLUCOSE SERPL-MCNC: 87 MG/DL — SIGNIFICANT CHANGE UP (ref 70–99)
HCT VFR BLD CALC: 27.8 % — LOW (ref 34.5–45)
HGB BLD-MCNC: 8.4 G/DL — LOW (ref 11.5–15.5)
MAGNESIUM SERPL-MCNC: 2.5 MG/DL — SIGNIFICANT CHANGE UP (ref 1.6–2.6)
MCHC RBC-ENTMCNC: 26.6 PG — LOW (ref 27–34)
MCHC RBC-ENTMCNC: 30.2 GM/DL — LOW (ref 32–36)
MCV RBC AUTO: 88 FL — SIGNIFICANT CHANGE UP (ref 80–100)
NRBC # BLD: 0 /100 WBCS — SIGNIFICANT CHANGE UP (ref 0–0)
NRBC # FLD: 0 K/UL — SIGNIFICANT CHANGE UP (ref 0–0)
PHOSPHATE SERPL-MCNC: 4.3 MG/DL — SIGNIFICANT CHANGE UP (ref 2.5–4.5)
PLATELET # BLD AUTO: 409 K/UL — HIGH (ref 150–400)
POTASSIUM SERPL-MCNC: 5 MMOL/L — SIGNIFICANT CHANGE UP (ref 3.5–5.3)
POTASSIUM SERPL-SCNC: 5 MMOL/L — SIGNIFICANT CHANGE UP (ref 3.5–5.3)
RBC # BLD: 3.16 M/UL — LOW (ref 3.8–5.2)
RBC # FLD: 18.7 % — HIGH (ref 10.3–14.5)
SARS-COV-2 RNA SPEC QL NAA+PROBE: SIGNIFICANT CHANGE UP
SODIUM SERPL-SCNC: 136 MMOL/L — SIGNIFICANT CHANGE UP (ref 135–145)
WBC # BLD: 9.69 K/UL — SIGNIFICANT CHANGE UP (ref 3.8–10.5)
WBC # FLD AUTO: 9.69 K/UL — SIGNIFICANT CHANGE UP (ref 3.8–10.5)

## 2024-09-21 PROCEDURE — 99232 SBSQ HOSP IP/OBS MODERATE 35: CPT

## 2024-09-21 RX ADMIN — Medication 25 MICROGRAM(S): at 06:59

## 2024-09-21 RX ADMIN — Medication 5000 UNIT(S): at 13:21

## 2024-09-21 RX ADMIN — Medication 5 MILLIGRAM(S): at 06:59

## 2024-09-21 RX ADMIN — Medication 5000 UNIT(S): at 21:02

## 2024-09-21 RX ADMIN — Medication 650 MILLIGRAM(S): at 00:48

## 2024-09-21 RX ADMIN — THIAMINE HYDROCHLORIDE 100 MILLIGRAM(S): 100 INJECTION, SOLUTION INTRAMUSCULAR; INTRAVENOUS at 13:21

## 2024-09-21 RX ADMIN — MULTI VITAMIN/MINERAL SUPPLEMENT WITH ASCORBIC ACID, NIACIN, PYRIDOXINE, PANTOTHENIC ACID, FOLIC ACID, RIBOFLAVIN, THIAMIN, BIOTIN, COBALAMIN AND ZINC. 1 TABLET(S): 60; 20; 12.5; 10; 10; 1.7; 1.5; 1; .3; .006 TABLET, COATED ORAL at 13:21

## 2024-09-21 RX ADMIN — Medication 650 MILLIGRAM(S): at 08:10

## 2024-09-21 RX ADMIN — Medication 5000 UNIT(S): at 06:59

## 2024-09-21 NOTE — PROGRESS NOTE ADULT - SUBJECTIVE AND OBJECTIVE BOX
PROGRESS NOTE:     Patient is a 58y old  Female who presents with a chief complaint of urgent HD (20 Sep 2024 13:53)      SUBJECTIVE / OVERNIGHT EVENTS: no acute events.     ADDITIONAL REVIEW OF SYSTEMS:    MEDICATIONS  (STANDING):  amLODIPine   Tablet 5 milliGRAM(s) Oral daily  chlorhexidine 2% Cloths 1 Application(s) Topical <User Schedule>  dextrose 5%. 1000 milliLiter(s) (100 mL/Hr) IV Continuous <Continuous>  dextrose 5%. 1000 milliLiter(s) (50 mL/Hr) IV Continuous <Continuous>  dextrose 50% Injectable 25 Gram(s) IV Push once  dextrose 50% Injectable 12.5 Gram(s) IV Push once  dextrose 50% Injectable 25 Gram(s) IV Push once  glucagon  Injectable 1 milliGRAM(s) IntraMuscular once  heparin   Injectable 5000 Unit(s) SubCutaneous every 8 hours  levothyroxine 25 MICROGram(s) Oral daily  Nephro-johny 1 Tablet(s) Oral daily  thiamine 100 milliGRAM(s) Oral daily    MEDICATIONS  (PRN):  dextrose Oral Gel 15 Gram(s) Oral once PRN Blood Glucose LESS THAN 70 milliGRAM(s)/deciliter  melatonin 3 milliGRAM(s) Oral at bedtime PRN Insomnia  sodium chloride 0.9% lock flush 10 milliLiter(s) IV Push every 1 hour PRN Pre/post blood products, medications, blood draw, and to maintain line patency      CAPILLARY BLOOD GLUCOSE        I&O's Summary    20 Sep 2024 07:01  -  21 Sep 2024 07:00  --------------------------------------------------------  IN: 210 mL / OUT: 2300 mL / NET: -2090 mL        PHYSICAL EXAM:  Vital Signs Last 24 Hrs  T(C): 36.7 (21 Sep 2024 11:35), Max: 37.4 (20 Sep 2024 19:53)  T(F): 98.1 (21 Sep 2024 11:35), Max: 99.3 (20 Sep 2024 19:53)  HR: 92 (21 Sep 2024 11:35) (74 - 93)  BP: 123/59 (21 Sep 2024 11:35) (123/59 - 140/65)  BP(mean): --  RR: 18 (21 Sep 2024 11:35) (17 - 18)  SpO2: 100% (21 Sep 2024 11:35) (100% - 100%)    Parameters below as of 21 Sep 2024 11:35  Patient On (Oxygen Delivery Method): room air    CONSTITUTIONAL: NAD  EYES: Conjunctiva and sclera clear  ENMT: Dry oral mucosa, poor oral hygiene suspect due to betel nut chewing  RESPIRATORY: Normal respiratory effort; lungs are clear to auscultation bilaterally  CARDIOVASCULAR: Regular rate and rhythm, normal S1 and S2, no murmur/rub/gallop  ABDOMEN: Soft, nontender to palpation, normoactive bowel sounds  : Christian  PSYCH: A+O to person  NEUROLOGY: Moves all extremities     LABS:                        8.4    9.69  )-----------( 409      ( 21 Sep 2024 07:20 )             27.8     09-21    136  |  107  |  32[H]  ----------------------------<  87  5.0   |  16[L]  |  2.24[H]    Ca    9.3      21 Sep 2024 07:20  Phos  4.3     09-21  Mg     2.50     09-21            Urinalysis Basic - ( 21 Sep 2024 07:20 )    Color: x / Appearance: x / SG: x / pH: x  Gluc: 87 mg/dL / Ketone: x  / Bili: x / Urobili: x   Blood: x / Protein: x / Nitrite: x   Leuk Esterase: x / RBC: x / WBC x   Sq Epi: x / Non Sq Epi: x / Bacteria: x          RADIOLOGY & ADDITIONAL TESTS:  Results Reviewed:   Imaging Personally Reviewed:  Electrocardiogram Personally Reviewed:    COORDINATION OF CARE:  Care Discussed with Consultants/Other Providers [Y/N]:  Prior or Outpatient Records Reviewed [Y/N]:

## 2024-09-21 NOTE — PROGRESS NOTE ADULT - ASSESSMENT
58 year old Ma[place]-speaking female with a history of RA on MTX, HTN, HLD initially admitted to MICU for sepsis with acute renal failure 2/2 ESBL. E. coli bacteremia and UTI. Hospital course c/b acute encephalopathy and dysphagia.

## 2024-09-22 VITALS
OXYGEN SATURATION: 100 % | HEART RATE: 93 BPM | RESPIRATION RATE: 18 BRPM | DIASTOLIC BLOOD PRESSURE: 65 MMHG | SYSTOLIC BLOOD PRESSURE: 120 MMHG | TEMPERATURE: 98 F

## 2024-09-22 LAB
ANION GAP SERPL CALC-SCNC: 13 MMOL/L — SIGNIFICANT CHANGE UP (ref 7–14)
BUN SERPL-MCNC: 34 MG/DL — HIGH (ref 7–23)
CALCIUM SERPL-MCNC: 9.1 MG/DL — SIGNIFICANT CHANGE UP (ref 8.4–10.5)
CHLORIDE SERPL-SCNC: 104 MMOL/L — SIGNIFICANT CHANGE UP (ref 98–107)
CO2 SERPL-SCNC: 18 MMOL/L — LOW (ref 22–31)
CREAT SERPL-MCNC: 2.32 MG/DL — HIGH (ref 0.5–1.3)
EGFR: 23 ML/MIN/1.73M2 — LOW
GLUCOSE SERPL-MCNC: 100 MG/DL — HIGH (ref 70–99)
HCT VFR BLD CALC: 26.7 % — LOW (ref 34.5–45)
HGB BLD-MCNC: 8.2 G/DL — LOW (ref 11.5–15.5)
MAGNESIUM SERPL-MCNC: 2.3 MG/DL — SIGNIFICANT CHANGE UP (ref 1.6–2.6)
MCHC RBC-ENTMCNC: 26.8 PG — LOW (ref 27–34)
MCHC RBC-ENTMCNC: 30.7 GM/DL — LOW (ref 32–36)
MCV RBC AUTO: 87.3 FL — SIGNIFICANT CHANGE UP (ref 80–100)
NRBC # BLD: 0 /100 WBCS — SIGNIFICANT CHANGE UP (ref 0–0)
NRBC # FLD: 0 K/UL — SIGNIFICANT CHANGE UP (ref 0–0)
PHOSPHATE SERPL-MCNC: 3.5 MG/DL — SIGNIFICANT CHANGE UP (ref 2.5–4.5)
PLATELET # BLD AUTO: 408 K/UL — HIGH (ref 150–400)
POTASSIUM SERPL-MCNC: 4.8 MMOL/L — SIGNIFICANT CHANGE UP (ref 3.5–5.3)
POTASSIUM SERPL-SCNC: 4.8 MMOL/L — SIGNIFICANT CHANGE UP (ref 3.5–5.3)
RBC # BLD: 3.06 M/UL — LOW (ref 3.8–5.2)
RBC # FLD: 18.6 % — HIGH (ref 10.3–14.5)
SODIUM SERPL-SCNC: 135 MMOL/L — SIGNIFICANT CHANGE UP (ref 135–145)
WBC # BLD: 11.02 K/UL — HIGH (ref 3.8–10.5)
WBC # FLD AUTO: 11.02 K/UL — HIGH (ref 3.8–10.5)

## 2024-09-22 PROCEDURE — 99239 HOSP IP/OBS DSCHRG MGMT >30: CPT

## 2024-09-22 RX ORDER — ONDANSETRON HCL/PF 4 MG/2 ML
1 VIAL (ML) INJECTION
Refills: 0 | DISCHARGE

## 2024-09-22 RX ORDER — LOSARTAN POTASSIUM 100 MG/1
1 TABLET, FILM COATED ORAL
Refills: 0 | DISCHARGE

## 2024-09-22 RX ORDER — AMLODIPINE BESYLATE 5 MG
1 TABLET ORAL
Qty: 30 | Refills: 0
Start: 2024-09-22 | End: 2024-10-21

## 2024-09-22 RX ORDER — GABAPENTIN 800 MG/1
1 TABLET, FILM COATED ORAL
Refills: 0 | DISCHARGE

## 2024-09-22 RX ORDER — METHOTREXATE 25 MG/.5ML
6 INJECTION, SOLUTION SUBCUTANEOUS
Refills: 0 | DISCHARGE

## 2024-09-22 RX ADMIN — Medication 5 MILLIGRAM(S): at 06:38

## 2024-09-22 RX ADMIN — MULTI VITAMIN/MINERAL SUPPLEMENT WITH ASCORBIC ACID, NIACIN, PYRIDOXINE, PANTOTHENIC ACID, FOLIC ACID, RIBOFLAVIN, THIAMIN, BIOTIN, COBALAMIN AND ZINC. 1 TABLET(S): 60; 20; 12.5; 10; 10; 1.7; 1.5; 1; .3; .006 TABLET, COATED ORAL at 12:58

## 2024-09-22 RX ADMIN — CHLORHEXIDINE GLUCONATE ORAL RINSE 1 APPLICATION(S): 1.2 SOLUTION DENTAL at 04:48

## 2024-09-22 RX ADMIN — THIAMINE HYDROCHLORIDE 100 MILLIGRAM(S): 100 INJECTION, SOLUTION INTRAMUSCULAR; INTRAVENOUS at 12:58

## 2024-09-22 RX ADMIN — Medication 25 MICROGRAM(S): at 04:49

## 2024-09-22 RX ADMIN — Medication 5000 UNIT(S): at 04:49

## 2024-09-22 RX ADMIN — Medication 5000 UNIT(S): at 12:58

## 2024-09-22 NOTE — DISCHARGE NOTE NURSING/CASE MANAGEMENT/SOCIAL WORK - NSDCPEFALRISK_GEN_ALL_CORE
For information on Fall & Injury Prevention, visit: [email or web address] OR  [email or web address] OR  https://www.cdc.gov/steadi/patient.html

## 2024-09-22 NOTE — PROGRESS NOTE ADULT - PROBLEM SELECTOR PROBLEM 4
Acute urinary retention
Hypernatremia
Hypernatremia
Acute urinary retention
Hypernatremia
Acute urinary retention

## 2024-09-22 NOTE — PROGRESS NOTE ADULT - PROBLEM SELECTOR PLAN 9
DVT ppx: HSQ  Diet: Pureed  Dispo: PT recommending STEPHANIE
DVT ppx: HSQ  Diet: Pureed  Dispo: PT recommending STEPHANIE
DVT ppx: HSQ  Diet: Pureed  Dispo: PT recommending STEPHANIE, consulted PMNR
DVT ppx: HSQ  Diet: Pureed  Dispo: PT recommending STEPHANIE
DVT ppx: HSQ  Diet: Pureed  Dispo: PT recommending STEPHANIE, but family now declining and want to take patient home     DC home, d/c time 38 minutes
DVT ppx: HSQ  Diet: Pureed  Dispo: PT recommending STEPHANIE

## 2024-09-22 NOTE — PROGRESS NOTE ADULT - SUBJECTIVE AND OBJECTIVE BOX
PROGRESS NOTE:     Patient is a 58y old  Female who presents with a chief complaint of urgent HD (21 Sep 2024 11:55)      SUBJECTIVE / OVERNIGHT EVENTS: No acute events.     ADDITIONAL REVIEW OF SYSTEMS:    MEDICATIONS  (STANDING):  amLODIPine   Tablet 5 milliGRAM(s) Oral daily  chlorhexidine 2% Cloths 1 Application(s) Topical <User Schedule>  dextrose 5%. 1000 milliLiter(s) (50 mL/Hr) IV Continuous <Continuous>  dextrose 5%. 1000 milliLiter(s) (100 mL/Hr) IV Continuous <Continuous>  dextrose 50% Injectable 25 Gram(s) IV Push once  dextrose 50% Injectable 12.5 Gram(s) IV Push once  dextrose 50% Injectable 25 Gram(s) IV Push once  glucagon  Injectable 1 milliGRAM(s) IntraMuscular once  heparin   Injectable 5000 Unit(s) SubCutaneous every 8 hours  levothyroxine 25 MICROGram(s) Oral daily  Nephro-johny 1 Tablet(s) Oral daily  thiamine 100 milliGRAM(s) Oral daily    MEDICATIONS  (PRN):  dextrose Oral Gel 15 Gram(s) Oral once PRN Blood Glucose LESS THAN 70 milliGRAM(s)/deciliter  melatonin 3 milliGRAM(s) Oral at bedtime PRN Insomnia  sodium chloride 0.9% lock flush 10 milliLiter(s) IV Push every 1 hour PRN Pre/post blood products, medications, blood draw, and to maintain line patency      CAPILLARY BLOOD GLUCOSE        I&O's Summary    21 Sep 2024 07:01  -  22 Sep 2024 07:00  --------------------------------------------------------  IN: 530 mL / OUT: 1570 mL / NET: -1040 mL        PHYSICAL EXAM:  Vital Signs Last 24 Hrs  T(C): 36.4 (22 Sep 2024 11:58), Max: 36.8 (22 Sep 2024 06:30)  T(F): 97.6 (22 Sep 2024 11:58), Max: 98.3 (22 Sep 2024 06:30)  HR: 93 (22 Sep 2024 11:58) (80 - 95)  BP: 120/65 (22 Sep 2024 11:58) (116/63 - 132/61)  BP(mean): --  RR: 18 (22 Sep 2024 11:58) (16 - 18)  SpO2: 100% (22 Sep 2024 11:58) (98% - 100%)    Parameters below as of 22 Sep 2024 11:58  Patient On (Oxygen Delivery Method): room air      CONSTITUTIONAL: NAD  EYES: Conjunctiva and sclera clear  ENMT: Dry oral mucosa, poor oral hygiene suspect due to betel nut chewing  RESPIRATORY: Normal respiratory effort; lungs are clear to auscultation bilaterally  CARDIOVASCULAR: Regular rate and rhythm, normal S1 and S2, no murmur/rub/gallop  ABDOMEN: Soft, nontender to palpation, normoactive bowel sounds  : Christian  PSYCH: A+O to person  NEUROLOGY: Moves all extremities       LABS:                        8.2    11.02 )-----------( 408      ( 22 Sep 2024 06:38 )             26.7     09-22    135  |  104  |  34[H]  ----------------------------<  100[H]  4.8   |  18[L]  |  2.32[H]    Ca    9.1      22 Sep 2024 06:38  Phos  3.5     09-22  Mg     2.30     09-22            Urinalysis Basic - ( 22 Sep 2024 06:38 )    Color: x / Appearance: x / SG: x / pH: x  Gluc: 100 mg/dL / Ketone: x  / Bili: x / Urobili: x   Blood: x / Protein: x / Nitrite: x   Leuk Esterase: x / RBC: x / WBC x   Sq Epi: x / Non Sq Epi: x / Bacteria: x          RADIOLOGY & ADDITIONAL TESTS:  Results Reviewed:   Imaging Personally Reviewed:  Electrocardiogram Personally Reviewed:    COORDINATION OF CARE:  Care Discussed with Consultants/Other Providers [Y/N]:  Prior or Outpatient Records Reviewed [Y/N]:

## 2024-09-22 NOTE — PROGRESS NOTE ADULT - PROBLEM SELECTOR PROBLEM 8
Essential hypertension
Need for prophylactic measure
Essential hypertension
Need for prophylactic measure
Essential hypertension
Essential hypertension
Need for prophylactic measure

## 2024-09-22 NOTE — PROGRESS NOTE ADULT - PROBLEM SELECTOR PLAN 8
BP elevated  -holding home losartan d/t LINA   -started amlodipine 5mg qd  -monitor BP
DVT ppx: HSQ  Diet: Puree with moderately thickened liquids  Dispo: Patient not currently participating with PT due to AMS- will reconsult when mental status improves    Updated patient's family on 0/14
BP elevated  -holding home losartan d/t LINA   -started amlodipine 5mg qd  -monitor BP
BP elevated  -holding home losartan d/t LINA   -started amlodipine 5mg qd  -monitor BP
DVT ppx: HSQ  Diet: Pureed  Dispo: PT recommending STEPHANIE  Updated patient's daughter on 9/16
BP elevated  -holding home losartan d/t LINA   -started amlodipine 5mg qd  -monitor BP
BP elevated  -holding home losartan d/t LINA   -started amlodipine 5mg qd  -monitor BP
DVT ppx: HSQ  Diet: Pureed  Dispo: PT recommending STEPHANIE  Updated patient's family on 9/14
BP elevated  -holding home losartan d/t LINA   -started amlodipine 5mg qd  -monitor BP

## 2024-09-22 NOTE — PROGRESS NOTE ADULT - PROBLEM SELECTOR PROBLEM 1
ILNA (acute kidney injury)
LINA (acute kidney injury)

## 2024-09-22 NOTE — PROGRESS NOTE ADULT - PROBLEM SELECTOR PROBLEM 2
Hypernatremia
UTI due to extended-spectrum beta lactamase (ESBL) producing Escherichia coli
Hypernatremia
Hypernatremia
UTI due to extended-spectrum beta lactamase (ESBL) producing Escherichia coli
Hypernatremia
UTI due to extended-spectrum beta lactamase (ESBL) producing Escherichia coli
Hypernatremia
UTI due to extended-spectrum beta lactamase (ESBL) producing Escherichia coli

## 2024-09-22 NOTE — PROGRESS NOTE ADULT - PROBLEM SELECTOR PLAN 4
Christian d/pat overnight on 9/13  -failed TOV, Christian replaced 9/14  -consider repeat TOV closer to discharge date
Anahi d/pat overnight on 9/13  -failed TOV, Christian replaced 9/14  -TOV in rehab
Anahi d/pat overnight on 9/13  -failed TOV, Christian replaced 9/14  -TOV in rehab
Na 153 today, likely due to patient removing NGT. suspect dehydration  -started on IVF  -seen by speech and swallow dept- started on pureed diet with moderately thickened liquids  -monitor Na
Anahi d/pat overnight on 9/13  -failed TOV, Christian replaced 9/14  -TOV in rehab
Na 155 today, likely due to patient removing NGT. suspect dehydration  -change IVF to D5  -seen by speech and swallow dept- started on pureed diet with moderately thickened liquids  -monitor Na
Anahi d/pat overnight on 9/13  -failed TOV, Christian replaced 9/14  -TOV in rehab
Na 149 today, likely due to dehydration/poor PO intake  -c/w D5 IVF  -seen by speech and swallow dept- started on pureed diet with moderately thickened liquids  -will request repeat eval given improvement in mental status, patient no longer agitated  -monitor Na
Anahi d/pat overnight on 9/13  -failed TOV, Christian replaced 9/14  -TOV in rehab
Anahi d/pat overnight on 9/13  -straight cathed with 900cc removed this AM  -continue straight cath protocol
Christian d/pat overnight on 9/13  -failed TOV, Christian replaced 9/14  -TOV as outpatient
Christian d/pat overnight on 9/13  -failed TOV, Christian replaced 9/14  -consider repeat TOV closer to discharge date

## 2024-09-22 NOTE — PROGRESS NOTE ADULT - REASON FOR ADMISSION
urgent HD

## 2024-09-22 NOTE — PROGRESS NOTE ADULT - NUTRITIONAL ASSESSMENT
This patient has been assessed with a concern for Malnutrition and has been determined to have a diagnosis/diagnoses of Moderate protein-calorie malnutrition.    This patient is being managed with:   Diet Pureed-  Supplement Feeding Modality:  Oral  Ensure Plus High Protein Cans or Servings Per Day:  1       Frequency:  Daily  Entered: Sep 18 2024  4:15PM  

## 2024-09-22 NOTE — PROGRESS NOTE ADULT - PROBLEM SELECTOR PLAN 6
Resolved  -d/c IVF  -seen by speech and swallow dept- started on pureed diet  -monitor Na
New diagnosis- TSH high, T3 and T4 low  -started on levothyroxine 25mcg daily  -repeat TFTs in 4-6 weeks outpatient
Resolved  -d/c IVF  -seen by speech and swallow dept- started on pureed diet  -monitor Na
New diagnosis- TSH high, T3 and T4 low  -started on levothyroxine 25mcg daily  -repeat TFTs in 4-6 weeks outpatient
New diagnosis- TSH high, T3 and T4 low  -started on levothyroxine 25mcg daily  -repeat TFTs in 4-6 weeks outpatient
Resolved  -d/c IVF  -monitor Na
Resolved  -d/c IVF  -seen by speech and swallow dept- started on pureed diet  -monitor Na
BP elevated  -holding home losartan  -started amlodipine 5mg qd

## 2024-09-22 NOTE — PROGRESS NOTE ADULT - PROVIDER SPECIALTY LIST ADULT
Hospitalist
Infectious Disease
MICU
Nephrology
Nephrology
Hospitalist
Hospitalist
Infectious Disease
MICU
Nephrology
Infectious Disease
Infectious Disease
Nephrology
Hospitalist
Nephrology
Nephrology
Hospitalist

## 2024-09-22 NOTE — DISCHARGE NOTE NURSING/CASE MANAGEMENT/SOCIAL WORK - PATIENT PORTAL LINK FT
You can access the FollowMyHealth Patient Portal offered by [facility] by registering at the following website: [email or web address]. By joining [facility]’s FollowMyHealth portal, you will also be able to view your health information using other applications (apps) compatible with our system.

## 2024-09-22 NOTE — PROGRESS NOTE ADULT - ASSESSMENT
58 year old [place]-speaking female with a history of RA on MTX, HTN, HLD initially admitted to MICU for sepsis with acute renal failure 2/2 ESBL. E. coli bacteremia and UTI. Hospital course c/b acute encephalopathy and dysphagia.

## 2024-09-22 NOTE — PROGRESS NOTE ADULT - PROBLEM SELECTOR PLAN 7
BP elevated  -holding home losartan  -started amlodipine 5mg qd
New diagnosis- TSH high, T3 and T4 low  -started on levothyroxine 25mcg daily  -repeat TFTs in 4-6 weeks outpatient
New diagnosis- TSH high, T3 and T4 low  -started on levothyroxine 25mcg daily  -repeat TFTs in 4-6 weeks outpatient
BP elevated  -holding home losartan  -started amlodipine 5mg qd  -monitor BP
New diagnosis- TSH high, T3 and T4 low  -started on levothyroxine 25mcg daily  -repeat TFTs in 4-6 weeks outpatient
BP elevated  -holding home losartan  -started amlodipine 5mg qd
New diagnosis- TSH high, T3 and T4 low  -started on levothyroxine 25mcg daily  -repeat TFTs in 4-6 weeks outpatient
New diagnosis- TSH high, T3 and T4 low  -started on levothyroxine 25mcg daily  -repeat TFTs in 4-6 weeks outpatient
DVT ppx: HSQ  Diet: Puree with moderately thickened liquids  Dispo: Patient not currently participating with PT due to AMS    Updated patient's son Vladislav on 9/13
New diagnosis- TSH high, T3 and T4 low  -started on levothyroxine 25mcg daily  -repeat TFTs in 4-6 weeks outpatient

## 2024-09-22 NOTE — PROGRESS NOTE ADULT - PROBLEM SELECTOR PROBLEM 6
Hypernatremia
Hypernatremia
no fever/no nausea/no vomiting/no diarrhea/no hematuria/no blood in stool/no abdominal distension/no chills
Hypernatremia
Hypothyroidism
Essential hypertension
Hypernatremia

## 2024-09-22 NOTE — PROGRESS NOTE ADULT - PROBLEM SELECTOR PROBLEM 7
Essential hypertension
Essential hypertension
Hypothyroidism
Hypothyroidism
Essential hypertension
Hypothyroidism
Need for prophylactic measure

## 2024-09-22 NOTE — PROGRESS NOTE ADULT - PROBLEM SELECTOR PLAN 3
Mary on exam  -spoke with patient's son Vladislav on 9/11- he states that patient was taking care of herself prior to admission, but sometimes would not be able to answer "easy" questions or was easily forgetful. never saw a neurologist for these symptoms in the past  -encephalopathy is likely multifactorial in setting of uremia, recent infection, ICU delirium  -mental status is slowly improving  -CT head on 9/9 without acute changes  -neurology consulted, appreciate recs
Mental status remains poor, today AAOx1 on exam  -spoke with patient's son Vladislav on 9/11- he states that patient was taking care of herself prior to admission, but sometimes would not be able to answer "easy" questions or was easily forgetful. never saw a neurologist for these symptoms in the past  -encephalopathy is likely multifactorial in setting of uremia, infection, ICU delirium, hypernatremia  -CT head on 9/9 without acute changes  -neurology consulted  -pt currently in b/l wrist restraints due to pulling at lines/tubes
RANJITOx1 on exam  -spoke with patient's son Vladislav on 9/11- he states that patient was taking care of herself prior to admission, but sometimes would not be able to answer "easy" questions or was easily forgetful. never saw a neurologist for these symptoms in the past  -spoke with patient's daughter on 9/14, she reports patient was "normal" prior to getting sick  -encephalopathy is likely multifactorial in setting of uremia, infection, ICU delirium  -mental status is slowly improving  -CT head on 9/9 without acute changes  -neurology consulted, appreciate recs-   -pt currently in b/l wrist restraints due to pulling at lines/tubes
Mary on exam  -spoke with patient's son Vladislav on 9/11- he states that patient was taking care of herself prior to admission, but sometimes would not be able to answer "easy" questions or was easily forgetful. never saw a neurologist for these symptoms in the past  -encephalopathy is likely multifactorial in setting of uremia, recent infection, ICU delirium  -mental status is slowly improving  - trial of thiamine  -CT head on 9/9 without acute changes  -neurology consulted, appreciate recs
490
Mental status remains poor, today AAOx1 on exam  -spoke with patient's son Vladislav on 9/11- he states that patient was taking care of herself prior to admission, but sometimes would not be able to answer "easy" questions or was easily forgetful. never saw a neurologist for these symptoms in the past  -encephalopathy is likely multifactorial in setting of uremia, infection, ICU delirium, hypernatremia  -CT head on 9/9 without acute changes  -neurology consulted, appreciate recs- mental status is currently close to baseline per pt's family  -pt currently in b/l wrist restraints due to pulling at lines/tubes
Mental status remains poor, today AAOx1 on exam  -spoke with patient's son Vladislav on 9/11- he states that patient was taking care of herself prior to admission, but sometimes would not be able to answer "easy" questions or was easily forgetful. never saw a neurologist for these symptoms in the past  -spoke with patient's daughter on 9/14, she reports patient was "normal" prior to getting sick  -encephalopathy is likely multifactorial in setting of uremia, infection, ICU delirium  -CT head on 9/9 without acute changes  -neurology consulted, appreciate recs-   -pt currently in b/l wrist restraints due to pulling at lines/tubes  -can consider MRI brain if patient is still confused next week
Mary on exam  -spoke with patient's son Vladislav on 9/11- he states that patient was taking care of herself prior to admission, but sometimes would not be able to answer "easy" questions or was easily forgetful. never saw a neurologist for these symptoms in the past  -encephalopathy is likely multifactorial in setting of uremia, infection, ICU delirium  -mental status is slowly improving  -CT head on 9/9 without acute changes  -neurology consulted, appreciate recs
Mental status remains poor, today AAOx1 on exam  -spoke with patient's son Vladislav on 9/11- he states that patient was taking care of herself prior to admission, but sometimes would not be able to answer "easy" questions or was easily forgetful. never saw a neurologist for these symptoms in the past  -encephalopathy is likely multifactorial in setting of uremia, infection, ICU delirium, hypernatremia  -CT head on 9/9 without acute changes  -neurology consulted  -pt currently in b/l wrist restraints due to pulling at lines/tubes
Mary on exam  -spoke with patient's son Vladislav on 9/11- he states that patient was taking care of herself prior to admission, but sometimes would not be able to answer "easy" questions or was easily forgetful. never saw a neurologist for these symptoms in the past  -encephalopathy is likely multifactorial in setting of uremia, recent infection, ICU delirium  -mental status is slowly improving  - trial of thiamine  -CT head on 9/9 without acute changes  -neurology consulted, appreciate recs
Mary on exam  -spoke with patient's son Vladislav on 9/11- he states that patient was taking care of herself prior to admission, but sometimes would not be able to answer "easy" questions or was easily forgetful. never saw a neurologist for these symptoms in the past  -encephalopathy is likely multifactorial in setting of uremia, recent infection, ICU delirium  -mental status is slowly improving  -trial of thiamine  -CT head on 9/9 without acute changes  -seen by speech and swallow dept- started on pureed diet  -neurology consulted, appreciate recs
Mary on exam  -spoke with patient's son Vladislav on 9/11- he states that patient was taking care of herself prior to admission, but sometimes would not be able to answer "easy" questions or was easily forgetful. never saw a neurologist for these symptoms in the past  -encephalopathy is likely multifactorial in setting of uremia, infection, ICU delirium  -mental status is slowly improving  -CT head on 9/9 without acute changes  -neurology consulted, appreciate recs
Mary on exam  -spoke with patient's son Vladislav on 9/11- he states that patient was taking care of herself prior to admission, but sometimes would not be able to answer "easy" questions or was easily forgetful. never saw a neurologist for these symptoms in the past  -encephalopathy is likely multifactorial in setting of uremia, recent infection, ICU delirium  -mental status is slowly improving  - trial of thiamine  -CT head on 9/9 without acute changes  -neurology consulted, appreciate recs

## 2024-09-23 PROBLEM — Z00.00 ENCOUNTER FOR PREVENTIVE HEALTH EXAMINATION: Status: ACTIVE | Noted: 2024-09-23

## 2024-09-23 LAB — PYRIDOXAL PHOS SERPL-MCNC: SIGNIFICANT CHANGE UP UG/L

## 2024-09-30 ENCOUNTER — APPOINTMENT (OUTPATIENT)
Dept: UROLOGY | Facility: CLINIC | Age: 59
End: 2024-09-30
Payer: MEDICAID

## 2024-09-30 VITALS
WEIGHT: 124 LBS | SYSTOLIC BLOOD PRESSURE: 133 MMHG | OXYGEN SATURATION: 99 % | BODY MASS INDEX: 24.35 KG/M2 | TEMPERATURE: 96.8 F | HEART RATE: 95 BPM | DIASTOLIC BLOOD PRESSURE: 79 MMHG | HEIGHT: 60 IN

## 2024-09-30 DIAGNOSIS — R33.9 RETENTION OF URINE, UNSPECIFIED: ICD-10-CM

## 2024-09-30 DIAGNOSIS — Z86.39 PERSONAL HISTORY OF OTHER ENDOCRINE, NUTRITIONAL AND METABOLIC DISEASE: ICD-10-CM

## 2024-09-30 DIAGNOSIS — Z86.79 PERSONAL HISTORY OF OTHER DISEASES OF THE CIRCULATORY SYSTEM: ICD-10-CM

## 2024-09-30 DIAGNOSIS — Z78.9 OTHER SPECIFIED HEALTH STATUS: ICD-10-CM

## 2024-09-30 PROBLEM — I10 ESSENTIAL (PRIMARY) HYPERTENSION: Chronic | Status: ACTIVE | Noted: 2024-09-05

## 2024-09-30 PROCEDURE — 51798 US URINE CAPACITY MEASURE: CPT

## 2024-09-30 PROCEDURE — 99459 PELVIC EXAMINATION: CPT

## 2024-09-30 PROCEDURE — 99203 OFFICE O/P NEW LOW 30 MIN: CPT | Mod: 25

## 2024-09-30 RX ORDER — DEXLANSOPRAZOLE 60 MG/1
60 CAPSULE, DELAYED RELEASE ORAL
Refills: 0 | Status: ACTIVE | COMMUNITY

## 2024-09-30 RX ORDER — ATORVASTATIN CALCIUM 20 MG/1
20 TABLET, FILM COATED ORAL
Refills: 0 | Status: ACTIVE | COMMUNITY

## 2024-09-30 RX ORDER — AMLODIPINE BESYLATE 5 MG/1
5 TABLET ORAL
Refills: 0 | Status: ACTIVE | COMMUNITY

## 2024-09-30 RX ORDER — LEVOTHYROXINE SODIUM 0.03 MG/1
25 TABLET ORAL
Refills: 0 | Status: ACTIVE | COMMUNITY

## 2024-09-30 RX ORDER — HYDROXYCHLOROQUINE SULFATE 200 MG/1
200 TABLET, FILM COATED ORAL
Refills: 0 | Status: ACTIVE | COMMUNITY

## 2024-09-30 NOTE — HISTORY OF PRESENT ILLNESS
[FreeTextEntry1] : 58F presents for initial evaluation of      PMH significant for: RA, HTN, HLD  PSH significant for: nothing  Significant meds: amlodipine, levothyroxine     Seen in ED for ESBL EColi bacteremia--admitted to MICU  Completed course of ertapenem 9/16  Also with [facility] (CR 7.08 on admission)-- received HD 9/8 & 9/9  CT A/P (9/5): Cystitis with ascending ureteritis and pyelonephritis.  UCx 9/5: ESBL Ecoli, Blood Cx 9/5: ESBL Ecoli  Failed inpatient TOV   At baseline only voiding complaint is intermittent dysuria

## 2024-09-30 NOTE — ASSESSMENT
[FreeTextEntry1] :  Ms. Call presents for initial evaluation of urinary retention Failed hospital TOV during admission for sepsis, acute renal failure Hospital notes, labs, and imaging reviewed  Successful office TOV today. PVR: 0  Rcommendations -bowel regimen -RTC 2 weeks for PVR check     I have spent 30 minutes of time on the encounter which excludes teaching and separately reported services

## 2024-09-30 NOTE — PHYSICAL EXAM
[General Appearance - Well Developed] : well developed [General Appearance - Well Nourished] : well nourished [Abdomen Soft] : soft [Abdomen Tenderness] : non-tender [de-identified] : bladder back filled, removed for void trial  [Chaperone Present] : A chaperone was present in the examining room during all aspects of the physical examination [00948] : A chaperone was present during the pelvic exam. [FreeTextEntry2] : FRANCISCA Cobb

## 2024-10-15 ENCOUNTER — APPOINTMENT (OUTPATIENT)
Dept: UROLOGY | Facility: CLINIC | Age: 59
End: 2024-10-15

## 2024-11-26 ENCOUNTER — APPOINTMENT (OUTPATIENT)
Dept: UROLOGY | Facility: CLINIC | Age: 59
End: 2024-11-26
Payer: MEDICAID

## 2024-11-26 VITALS
HEART RATE: 100 BPM | TEMPERATURE: 96.8 F | OXYGEN SATURATION: 97 % | DIASTOLIC BLOOD PRESSURE: 83 MMHG | SYSTOLIC BLOOD PRESSURE: 133 MMHG

## 2024-11-26 DIAGNOSIS — R33.9 RETENTION OF URINE, UNSPECIFIED: ICD-10-CM

## 2024-11-26 DIAGNOSIS — N95.2 POSTMENOPAUSAL ATROPHIC VAGINITIS: ICD-10-CM

## 2024-11-26 PROCEDURE — G2211 COMPLEX E/M VISIT ADD ON: CPT | Mod: NC

## 2024-11-26 PROCEDURE — 51798 US URINE CAPACITY MEASURE: CPT

## 2024-11-26 PROCEDURE — 99214 OFFICE O/P EST MOD 30 MIN: CPT | Mod: 25

## 2024-11-26 RX ORDER — ESTRADIOL 0.1 MG/G
0.1 CREAM VAGINAL
Qty: 1 | Refills: 3 | Status: ACTIVE | COMMUNITY
Start: 2024-11-26 | End: 1900-01-01

## 2024-11-27 ENCOUNTER — EMERGENCY (EMERGENCY)
Facility: HOSPITAL | Age: 59
LOS: 1 days | Discharge: HOME | End: 2024-11-27
Attending: STUDENT IN AN ORGANIZED HEALTH CARE EDUCATION/TRAINING PROGRAM | Admitting: STUDENT IN AN ORGANIZED HEALTH CARE EDUCATION/TRAINING PROGRAM
Payer: MEDICAID

## 2024-11-27 VITALS
RESPIRATION RATE: 18 BRPM | WEIGHT: 119.93 LBS | HEIGHT: 58 IN | OXYGEN SATURATION: 98 % | DIASTOLIC BLOOD PRESSURE: 79 MMHG | SYSTOLIC BLOOD PRESSURE: 149 MMHG | HEART RATE: 98 BPM | TEMPERATURE: 98 F

## 2024-11-27 LAB
ALBUMIN SERPL ELPH-MCNC: 4.5 G/DL — SIGNIFICANT CHANGE UP (ref 3.3–5)
ALP SERPL-CCNC: 113 U/L — SIGNIFICANT CHANGE UP (ref 40–120)
ALT FLD-CCNC: 9 U/L — SIGNIFICANT CHANGE UP (ref 4–33)
ANION GAP SERPL CALC-SCNC: 12 MMOL/L — SIGNIFICANT CHANGE UP (ref 7–14)
APPEARANCE UR: ABNORMAL
AST SERPL-CCNC: 17 U/L — SIGNIFICANT CHANGE UP (ref 4–32)
BASOPHILS # BLD AUTO: 0.04 K/UL — SIGNIFICANT CHANGE UP (ref 0–0.2)
BASOPHILS NFR BLD AUTO: 0.5 % — SIGNIFICANT CHANGE UP (ref 0–2)
BILIRUB SERPL-MCNC: 0.2 MG/DL — SIGNIFICANT CHANGE UP (ref 0.2–1.2)
BILIRUB UR-MCNC: NEGATIVE — SIGNIFICANT CHANGE UP
BUN SERPL-MCNC: 19 MG/DL — SIGNIFICANT CHANGE UP (ref 7–23)
CALCIUM SERPL-MCNC: 9.7 MG/DL — SIGNIFICANT CHANGE UP (ref 8.4–10.5)
CHLORIDE SERPL-SCNC: 106 MMOL/L — SIGNIFICANT CHANGE UP (ref 98–107)
CO2 SERPL-SCNC: 23 MMOL/L — SIGNIFICANT CHANGE UP (ref 22–31)
COLOR SPEC: YELLOW — SIGNIFICANT CHANGE UP
CREAT SERPL-MCNC: 1.23 MG/DL — SIGNIFICANT CHANGE UP (ref 0.5–1.3)
DIFF PNL FLD: ABNORMAL
EGFR: 50 ML/MIN/1.73M2 — LOW
EGFR: 50 ML/MIN/1.73M2 — LOW
EOSINOPHIL # BLD AUTO: 0.24 K/UL — SIGNIFICANT CHANGE UP (ref 0–0.5)
EOSINOPHIL NFR BLD AUTO: 3 % — SIGNIFICANT CHANGE UP (ref 0–6)
GLUCOSE SERPL-MCNC: 73 MG/DL — SIGNIFICANT CHANGE UP (ref 70–99)
GLUCOSE UR QL: NEGATIVE MG/DL — SIGNIFICANT CHANGE UP
HCT VFR BLD CALC: 37.8 % — SIGNIFICANT CHANGE UP (ref 34.5–45)
HGB BLD-MCNC: 11.9 G/DL — SIGNIFICANT CHANGE UP (ref 11.5–15.5)
IANC: 5.04 K/UL — SIGNIFICANT CHANGE UP (ref 1.8–7.4)
IMM GRANULOCYTES NFR BLD AUTO: 0.4 % — SIGNIFICANT CHANGE UP (ref 0–0.9)
KETONES UR-MCNC: NEGATIVE MG/DL — SIGNIFICANT CHANGE UP
LEUKOCYTE ESTERASE UR-ACNC: ABNORMAL
LYMPHOCYTES # BLD AUTO: 2.07 K/UL — SIGNIFICANT CHANGE UP (ref 1–3.3)
LYMPHOCYTES # BLD AUTO: 26.3 % — SIGNIFICANT CHANGE UP (ref 13–44)
MCHC RBC-ENTMCNC: 27.7 PG — SIGNIFICANT CHANGE UP (ref 27–34)
MCHC RBC-ENTMCNC: 31.5 G/DL — LOW (ref 32–36)
MCV RBC AUTO: 87.9 FL — SIGNIFICANT CHANGE UP (ref 80–100)
MONOCYTES # BLD AUTO: 0.45 K/UL — SIGNIFICANT CHANGE UP (ref 0–0.9)
MONOCYTES NFR BLD AUTO: 5.7 % — SIGNIFICANT CHANGE UP (ref 2–14)
NEUTROPHILS # BLD AUTO: 5.04 K/UL — SIGNIFICANT CHANGE UP (ref 1.8–7.4)
NEUTROPHILS NFR BLD AUTO: 64.1 % — SIGNIFICANT CHANGE UP (ref 43–77)
NITRITE UR-MCNC: NEGATIVE — SIGNIFICANT CHANGE UP
NRBC # BLD AUTO: 0 K/UL — SIGNIFICANT CHANGE UP (ref 0–0)
NRBC # BLD: 0 /100 WBCS — SIGNIFICANT CHANGE UP (ref 0–0)
NRBC # FLD: 0 K/UL — SIGNIFICANT CHANGE UP (ref 0–0)
NRBC BLD-RTO: 0 /100 WBCS — SIGNIFICANT CHANGE UP (ref 0–0)
PH UR: 6 — SIGNIFICANT CHANGE UP (ref 5–8)
PLATELET # BLD AUTO: 265 K/UL — SIGNIFICANT CHANGE UP (ref 150–400)
POTASSIUM SERPL-MCNC: 4.3 MMOL/L — SIGNIFICANT CHANGE UP (ref 3.5–5.3)
POTASSIUM SERPL-SCNC: 4.3 MMOL/L — SIGNIFICANT CHANGE UP (ref 3.5–5.3)
PROT SERPL-MCNC: 9.1 G/DL — HIGH (ref 6–8.3)
PROT UR-MCNC: 100 MG/DL
RBC # BLD: 4.3 M/UL — SIGNIFICANT CHANGE UP (ref 3.8–5.2)
RBC # FLD: 13.9 % — SIGNIFICANT CHANGE UP (ref 10.3–14.5)
SODIUM SERPL-SCNC: 141 MMOL/L — SIGNIFICANT CHANGE UP (ref 135–145)
SP GR SPEC: 1 — SIGNIFICANT CHANGE UP (ref 1–1.03)
UROBILINOGEN FLD QL: 0.2 MG/DL — SIGNIFICANT CHANGE UP (ref 0.2–1)
WBC # BLD: 7.87 K/UL — SIGNIFICANT CHANGE UP (ref 3.8–10.5)
WBC # FLD AUTO: 7.87 K/UL — SIGNIFICANT CHANGE UP (ref 3.8–10.5)

## 2024-11-27 PROCEDURE — 99284 EMERGENCY DEPT VISIT MOD MDM: CPT

## 2024-11-27 RX ORDER — CEFPODOXIME PROXETIL 200 MG/1
100 TABLET, FILM COATED ORAL EVERY 12 HOURS
Refills: 0 | Status: DISCONTINUED | OUTPATIENT
Start: 2024-11-27 | End: 2024-12-01

## 2024-11-27 RX ORDER — CEFPODOXIME PROXETIL 200 MG/1
1 TABLET, FILM COATED ORAL
Qty: 14 | Refills: 0
Start: 2024-11-27 | End: 2024-12-03

## 2024-11-27 RX ADMIN — CEFPODOXIME PROXETIL 100 MILLIGRAM(S): 200 TABLET, FILM COATED ORAL at 22:09

## 2024-11-30 LAB
-  AMPICILLIN/SULBACTAM: SIGNIFICANT CHANGE UP
-  AMPICILLIN: SIGNIFICANT CHANGE UP
-  AZTREONAM: SIGNIFICANT CHANGE UP
-  CEFAZOLIN: SIGNIFICANT CHANGE UP
-  CEFEPIME: SIGNIFICANT CHANGE UP
-  CEFTRIAXONE: SIGNIFICANT CHANGE UP
-  CEFUROXIME: SIGNIFICANT CHANGE UP
-  CIPROFLOXACIN: SIGNIFICANT CHANGE UP
-  ERTAPENEM: SIGNIFICANT CHANGE UP
-  GENTAMICIN: SIGNIFICANT CHANGE UP
-  IMIPENEM: SIGNIFICANT CHANGE UP
-  LEVOFLOXACIN: SIGNIFICANT CHANGE UP
-  MEROPENEM: SIGNIFICANT CHANGE UP
-  NITROFURANTOIN: SIGNIFICANT CHANGE UP
-  PIPERACILLIN/TAZOBACTAM: SIGNIFICANT CHANGE UP
-  TOBRAMYCIN: SIGNIFICANT CHANGE UP
-  TRIMETHOPRIM/SULFAMETHOXAZOLE: SIGNIFICANT CHANGE UP
CULTURE RESULTS: ABNORMAL
METHOD TYPE: SIGNIFICANT CHANGE UP
ORGANISM # SPEC MICROSCOPIC CNT: ABNORMAL
ORGANISM # SPEC MICROSCOPIC CNT: ABNORMAL
SPECIMEN SOURCE: SIGNIFICANT CHANGE UP

## 2024-12-01 RX ORDER — SULFAMETHOXAZOLE/TRIMETHOPRIM 800-160 MG
1 TABLET ORAL
Qty: 20 | Refills: 0
Start: 2024-12-01 | End: 2024-12-10

## 2024-12-10 PROBLEM — Z87.39 PERSONAL HISTORY OF OTHER DISEASES OF THE MUSCULOSKELETAL SYSTEM AND CONNECTIVE TISSUE: Chronic | Status: ACTIVE | Noted: 2024-11-27

## 2024-12-10 PROBLEM — Z86.19 PERSONAL HISTORY OF OTHER INFECTIOUS AND PARASITIC DISEASES: Chronic | Status: ACTIVE | Noted: 2024-11-27

## 2024-12-12 ENCOUNTER — APPOINTMENT (OUTPATIENT)
Dept: UROLOGY | Facility: CLINIC | Age: 59
End: 2024-12-12
Payer: MEDICAID

## 2024-12-12 VITALS
HEART RATE: 97 BPM | OXYGEN SATURATION: 97 % | BODY MASS INDEX: 24.35 KG/M2 | RESPIRATION RATE: 17 BRPM | SYSTOLIC BLOOD PRESSURE: 134 MMHG | WEIGHT: 124 LBS | HEIGHT: 60 IN | TEMPERATURE: 97.3 F | DIASTOLIC BLOOD PRESSURE: 81 MMHG

## 2024-12-12 DIAGNOSIS — N39.0 URINARY TRACT INFECTION, SITE NOT SPECIFIED: ICD-10-CM

## 2024-12-12 PROCEDURE — 99204 OFFICE O/P NEW MOD 45 MIN: CPT

## 2024-12-12 RX ORDER — SULFAMETHOXAZOLE AND TRIMETHOPRIM 800; 160 MG/1; MG/1
800-160 TABLET ORAL TWICE DAILY
Qty: 10 | Refills: 0 | Status: ACTIVE | COMMUNITY
Start: 2024-12-12 | End: 1900-01-01

## 2024-12-15 LAB
APPEARANCE: CLEAR
BACTERIA: NEGATIVE /HPF
BILIRUBIN URINE: NEGATIVE
BLOOD URINE: NEGATIVE
CAST: 1 /LPF
COLOR: YELLOW
EPITHELIAL CELLS: 1 /HPF
GLUCOSE QUALITATIVE U: NEGATIVE MG/DL
KETONES URINE: NEGATIVE MG/DL
LEUKOCYTE ESTERASE URINE: ABNORMAL
MICROSCOPIC-UA: NORMAL
NITRITE URINE: NEGATIVE
PH URINE: 6.5
PROTEIN URINE: NEGATIVE MG/DL
RED BLOOD CELLS URINE: 0 /HPF
SPECIFIC GRAVITY URINE: <1.005
UROBILINOGEN URINE: 0.2 MG/DL
WHITE BLOOD CELLS URINE: 39 /HPF

## 2024-12-16 LAB — BACTERIA UR CULT: ABNORMAL

## 2025-01-07 ENCOUNTER — APPOINTMENT (OUTPATIENT)
Dept: UROLOGY | Facility: CLINIC | Age: 60
End: 2025-01-07

## 2025-07-29 NOTE — PROGRESS NOTE ADULT - PROBLEM SELECTOR PROBLEM 5
Date of study: 7/29/25    Reason for exam: post cardiac arrest myoclonus    Technical: This is a digitally recorded EEG.  The international 10-20 electrode placement system was used.  Another channel was used for EKG.    Description:  The activity consists of periods of suppression that are interrupted with bursts of activity.  The patient was on propofol and this pattern is consistent with burst suppression.  The bursts of activity begin with spike wave discharges consistent with cortical myoclonus.  It is symmetrical bilaterally. The discharges do not evolve or become rhythmic. Photic stimulation and hyperventilation were not performed.  The patient was unresponsive for the study.    Impression:  This EEG is abnormal due to burst suppression.  This is consistent with sedation.  The beginning of the discharges are associated with myoclonus.  No seizure activity is seen.  Clinical correlation is advised.     Anemia